# Patient Record
Sex: MALE | Race: WHITE | NOT HISPANIC OR LATINO | URBAN - METROPOLITAN AREA
[De-identification: names, ages, dates, MRNs, and addresses within clinical notes are randomized per-mention and may not be internally consistent; named-entity substitution may affect disease eponyms.]

---

## 2017-12-27 PROBLEM — Z12.11 SCREENING FOR COLON CANCER: Status: ACTIVE | Noted: 2017-12-27

## 2018-08-01 PROBLEM — I77.9 PAOD (PERIPHERAL ARTERIAL OCCLUSIVE DISEASE) (HCC): Status: ACTIVE | Noted: 2018-08-01

## 2018-08-01 PROBLEM — I77.9 PAOD (PERIPHERAL ARTERIAL OCCLUSIVE DISEASE): Status: ACTIVE | Noted: 2018-08-01

## 2019-05-08 ENCOUNTER — APPOINTMENT (RX ONLY)
Dept: URBAN - METROPOLITAN AREA CLINIC 151 | Facility: CLINIC | Age: 74
Setting detail: DERMATOLOGY
End: 2019-05-08

## 2019-05-08 DIAGNOSIS — L20.89 OTHER ATOPIC DERMATITIS: ICD-10-CM

## 2019-05-08 DIAGNOSIS — Z71.89 OTHER SPECIFIED COUNSELING: ICD-10-CM

## 2019-05-08 DIAGNOSIS — L82.1 OTHER SEBORRHEIC KERATOSIS: ICD-10-CM

## 2019-05-08 DIAGNOSIS — L57.0 ACTINIC KERATOSIS: ICD-10-CM

## 2019-05-08 DIAGNOSIS — L91.8 OTHER HYPERTROPHIC DISORDERS OF THE SKIN: ICD-10-CM

## 2019-05-08 DIAGNOSIS — Z85.828 PERSONAL HISTORY OF OTHER MALIGNANT NEOPLASM OF SKIN: ICD-10-CM

## 2019-05-08 DIAGNOSIS — L81.4 OTHER MELANIN HYPERPIGMENTATION: ICD-10-CM

## 2019-05-08 PROBLEM — L20.84 INTRINSIC (ALLERGIC) ECZEMA: Status: ACTIVE | Noted: 2019-05-08

## 2019-05-08 PROCEDURE — 99213 OFFICE O/P EST LOW 20 MIN: CPT | Mod: 25

## 2019-05-08 PROCEDURE — ? INVENTORY

## 2019-05-08 PROCEDURE — ? COUNSELING

## 2019-05-08 PROCEDURE — 17004 DESTROY PREMAL LESIONS 15/>: CPT

## 2019-05-08 PROCEDURE — ? LIQUID NITROGEN

## 2019-05-08 PROCEDURE — ? TREATMENT REGIMEN

## 2019-05-08 ASSESSMENT — LOCATION ZONE DERM
LOCATION ZONE: FACE
LOCATION ZONE: SCALP
LOCATION ZONE: LEG
LOCATION ZONE: EYELID
LOCATION ZONE: HAND
LOCATION ZONE: TRUNK
LOCATION ZONE: NECK
LOCATION ZONE: ARM

## 2019-05-08 ASSESSMENT — LOCATION DETAILED DESCRIPTION DERM
LOCATION DETAILED: POSTERIOR MID-PARIETAL SCALP
LOCATION DETAILED: LEFT PROXIMAL DORSAL FOREARM
LOCATION DETAILED: RIGHT INFERIOR LID MARGIN
LOCATION DETAILED: RIGHT SUPERIOR FOREHEAD
LOCATION DETAILED: LEFT DISTAL DORSAL FOREARM
LOCATION DETAILED: SUPERIOR MID FOREHEAD
LOCATION DETAILED: LEFT INFERIOR UPPER BACK
LOCATION DETAILED: LEFT CLAVICULAR NECK
LOCATION DETAILED: RIGHT FOREHEAD
LOCATION DETAILED: LEFT SUPERIOR FOREHEAD
LOCATION DETAILED: RIGHT RADIAL DORSAL HAND
LOCATION DETAILED: LEFT LATERAL ABDOMEN
LOCATION DETAILED: LEFT FOREHEAD
LOCATION DETAILED: LEFT LATERAL TEMPLE
LOCATION DETAILED: RIGHT LATERAL UPPER BACK
LOCATION DETAILED: LEFT POSTERIOR SHOULDER
LOCATION DETAILED: RIGHT INFERIOR LATERAL MIDBACK
LOCATION DETAILED: RIGHT ANTERIOR DISTAL THIGH
LOCATION DETAILED: LEFT SUPERIOR PARIETAL SCALP
LOCATION DETAILED: RIGHT POSTERIOR SHOULDER
LOCATION DETAILED: RIGHT INFERIOR LATERAL FOREHEAD
LOCATION DETAILED: LEFT CENTRAL ZYGOMA
LOCATION DETAILED: RIGHT SUPERIOR PARIETAL SCALP
LOCATION DETAILED: RIGHT DISTAL DORSAL FOREARM
LOCATION DETAILED: LEFT RADIAL DORSAL HAND
LOCATION DETAILED: LEFT PROXIMAL CALF

## 2019-05-08 ASSESSMENT — LOCATION SIMPLE DESCRIPTION DERM
LOCATION SIMPLE: LEFT ANTERIOR NECK
LOCATION SIMPLE: RIGHT SHOULDER
LOCATION SIMPLE: RIGHT FOREHEAD
LOCATION SIMPLE: ABDOMEN
LOCATION SIMPLE: LEFT TEMPLE
LOCATION SIMPLE: LEFT SHOULDER
LOCATION SIMPLE: LEFT UPPER BACK
LOCATION SIMPLE: LEFT HAND
LOCATION SIMPLE: POSTERIOR SCALP
LOCATION SIMPLE: RIGHT THIGH
LOCATION SIMPLE: RIGHT FOREARM
LOCATION SIMPLE: SCALP
LOCATION SIMPLE: RIGHT HAND
LOCATION SIMPLE: LEFT FOREHEAD
LOCATION SIMPLE: RIGHT LOWER BACK
LOCATION SIMPLE: LEFT ZYGOMA
LOCATION SIMPLE: LEFT FOREARM
LOCATION SIMPLE: SUPERIOR FOREHEAD
LOCATION SIMPLE: RIGHT INFERIOR EYELID
LOCATION SIMPLE: RIGHT UPPER BACK
LOCATION SIMPLE: LEFT CALF

## 2019-11-13 ENCOUNTER — APPOINTMENT (RX ONLY)
Dept: URBAN - METROPOLITAN AREA CLINIC 151 | Facility: CLINIC | Age: 74
Setting detail: DERMATOLOGY
End: 2019-11-13

## 2019-11-13 DIAGNOSIS — D18.0 HEMANGIOMA: ICD-10-CM

## 2019-11-13 DIAGNOSIS — L82.1 OTHER SEBORRHEIC KERATOSIS: ICD-10-CM

## 2019-11-13 DIAGNOSIS — L57.0 ACTINIC KERATOSIS: ICD-10-CM

## 2019-11-13 DIAGNOSIS — L81.4 OTHER MELANIN HYPERPIGMENTATION: ICD-10-CM

## 2019-11-13 DIAGNOSIS — Z71.89 OTHER SPECIFIED COUNSELING: ICD-10-CM

## 2019-11-13 DIAGNOSIS — Z85.828 PERSONAL HISTORY OF OTHER MALIGNANT NEOPLASM OF SKIN: ICD-10-CM

## 2019-11-13 PROBLEM — D18.01 HEMANGIOMA OF SKIN AND SUBCUTANEOUS TISSUE: Status: ACTIVE | Noted: 2019-11-13

## 2019-11-13 PROBLEM — D48.5 NEOPLASM OF UNCERTAIN BEHAVIOR OF SKIN: Status: ACTIVE | Noted: 2019-11-13

## 2019-11-13 PROCEDURE — ? BIOPSY BY SHAVE METHOD

## 2019-11-13 PROCEDURE — 11102 TANGNTL BX SKIN SINGLE LES: CPT

## 2019-11-13 PROCEDURE — 99213 OFFICE O/P EST LOW 20 MIN: CPT | Mod: 25

## 2019-11-13 PROCEDURE — ? LIQUID NITROGEN

## 2019-11-13 PROCEDURE — ? COUNSELING

## 2019-11-13 PROCEDURE — 17000 DESTRUCT PREMALG LESION: CPT | Mod: 59

## 2019-11-13 PROCEDURE — ? FULL BODY SKIN EXAM

## 2019-11-13 PROCEDURE — 17003 DESTRUCT PREMALG LES 2-14: CPT

## 2019-11-13 ASSESSMENT — LOCATION SIMPLE DESCRIPTION DERM
LOCATION SIMPLE: RIGHT PRETIBIAL REGION
LOCATION SIMPLE: RIGHT HAND
LOCATION SIMPLE: CHEST
LOCATION SIMPLE: RIGHT UPPER BACK
LOCATION SIMPLE: LEFT UPPER BACK
LOCATION SIMPLE: LEFT FOREHEAD
LOCATION SIMPLE: RIGHT SCALP
LOCATION SIMPLE: RIGHT FOREARM
LOCATION SIMPLE: LEFT FOREARM
LOCATION SIMPLE: LEFT CHEEK
LOCATION SIMPLE: SUPERIOR FOREHEAD
LOCATION SIMPLE: RIGHT CHEEK
LOCATION SIMPLE: ABDOMEN
LOCATION SIMPLE: LEFT ZYGOMA

## 2019-11-13 ASSESSMENT — LOCATION DETAILED DESCRIPTION DERM
LOCATION DETAILED: SUPERIOR MID FOREHEAD
LOCATION DETAILED: RIGHT DISTAL RADIAL DORSAL FOREARM
LOCATION DETAILED: LEFT CENTRAL ZYGOMA
LOCATION DETAILED: LEFT DISTAL DORSAL FOREARM
LOCATION DETAILED: RIGHT PROXIMAL PRETIBIAL REGION
LOCATION DETAILED: LEFT DISTAL RADIAL DORSAL FOREARM
LOCATION DETAILED: RIGHT INFERIOR CENTRAL MALAR CHEEK
LOCATION DETAILED: LEFT RIB CAGE
LOCATION DETAILED: RIGHT DISTAL DORSAL FOREARM
LOCATION DETAILED: LEFT SUPERIOR MEDIAL FOREHEAD
LOCATION DETAILED: LEFT MID-UPPER BACK
LOCATION DETAILED: LEFT SUPERIOR UPPER BACK
LOCATION DETAILED: LEFT LATERAL FOREHEAD
LOCATION DETAILED: RIGHT LATERAL ABDOMEN
LOCATION DETAILED: RIGHT SUPERIOR UPPER BACK
LOCATION DETAILED: RIGHT MEDIAL SUPERIOR CHEST
LOCATION DETAILED: LEFT PROXIMAL DORSAL FOREARM
LOCATION DETAILED: RIGHT CENTRAL FRONTAL SCALP
LOCATION DETAILED: LEFT INFERIOR LATERAL MALAR CHEEK
LOCATION DETAILED: RIGHT RADIAL DORSAL HAND
LOCATION DETAILED: RIGHT INFERIOR LATERAL UPPER BACK
LOCATION DETAILED: LEFT LATERAL ZYGOMA
LOCATION DETAILED: LEFT SUPERIOR FOREHEAD

## 2019-11-13 ASSESSMENT — LOCATION ZONE DERM
LOCATION ZONE: ARM
LOCATION ZONE: FACE
LOCATION ZONE: LEG
LOCATION ZONE: TRUNK
LOCATION ZONE: SCALP
LOCATION ZONE: HAND

## 2020-02-17 ENCOUNTER — APPOINTMENT (RX ONLY)
Dept: URBAN - METROPOLITAN AREA CLINIC 151 | Facility: CLINIC | Age: 75
Setting detail: DERMATOLOGY
End: 2020-02-17

## 2020-02-17 DIAGNOSIS — L20.89 OTHER ATOPIC DERMATITIS: ICD-10-CM

## 2020-02-17 DIAGNOSIS — L81.4 OTHER MELANIN HYPERPIGMENTATION: ICD-10-CM

## 2020-02-17 DIAGNOSIS — L82.0 INFLAMED SEBORRHEIC KERATOSIS: ICD-10-CM

## 2020-02-17 DIAGNOSIS — Z71.89 OTHER SPECIFIED COUNSELING: ICD-10-CM

## 2020-02-17 DIAGNOSIS — L57.0 ACTINIC KERATOSIS: ICD-10-CM

## 2020-02-17 PROBLEM — L20.84 INTRINSIC (ALLERGIC) ECZEMA: Status: ACTIVE | Noted: 2020-02-17

## 2020-02-17 PROCEDURE — ? FULL BODY SKIN EXAM - DECLINED

## 2020-02-17 PROCEDURE — ? COUNSELING

## 2020-02-17 PROCEDURE — 17110 DESTRUCTION B9 LES UP TO 14: CPT

## 2020-02-17 PROCEDURE — 99213 OFFICE O/P EST LOW 20 MIN: CPT | Mod: 25

## 2020-02-17 PROCEDURE — 17000 DESTRUCT PREMALG LESION: CPT | Mod: 59

## 2020-02-17 PROCEDURE — ? LIQUID NITROGEN

## 2020-02-17 ASSESSMENT — LOCATION DETAILED DESCRIPTION DERM
LOCATION DETAILED: LEFT PROXIMAL DORSAL FOREARM
LOCATION DETAILED: LEFT SUPERIOR MEDIAL FOREHEAD
LOCATION DETAILED: LEFT LATERAL MALAR CHEEK
LOCATION DETAILED: RIGHT MEDIAL TEMPLE
LOCATION DETAILED: RIGHT INFERIOR LATERAL MALAR CHEEK
LOCATION DETAILED: RIGHT DISTAL DORSAL FOREARM
LOCATION DETAILED: RIGHT CENTRAL TEMPLE
LOCATION DETAILED: RIGHT MEDIAL FOREHEAD

## 2020-02-17 ASSESSMENT — LOCATION SIMPLE DESCRIPTION DERM
LOCATION SIMPLE: RIGHT CHEEK
LOCATION SIMPLE: LEFT CHEEK
LOCATION SIMPLE: RIGHT TEMPLE
LOCATION SIMPLE: RIGHT FOREARM
LOCATION SIMPLE: LEFT FOREARM
LOCATION SIMPLE: RIGHT FOREHEAD
LOCATION SIMPLE: LEFT FOREHEAD

## 2020-02-17 ASSESSMENT — LOCATION ZONE DERM
LOCATION ZONE: ARM
LOCATION ZONE: FACE

## 2020-05-13 ENCOUNTER — APPOINTMENT (RX ONLY)
Dept: URBAN - METROPOLITAN AREA CLINIC 151 | Facility: CLINIC | Age: 75
Setting detail: DERMATOLOGY
End: 2020-05-13

## 2020-05-13 DIAGNOSIS — L57.0 ACTINIC KERATOSIS: ICD-10-CM

## 2020-05-13 DIAGNOSIS — Z85.828 PERSONAL HISTORY OF OTHER MALIGNANT NEOPLASM OF SKIN: ICD-10-CM

## 2020-05-13 DIAGNOSIS — Z71.89 OTHER SPECIFIED COUNSELING: ICD-10-CM

## 2020-05-13 DIAGNOSIS — L81.4 OTHER MELANIN HYPERPIGMENTATION: ICD-10-CM

## 2020-05-13 DIAGNOSIS — L82.1 OTHER SEBORRHEIC KERATOSIS: ICD-10-CM

## 2020-05-13 PROBLEM — D48.5 NEOPLASM OF UNCERTAIN BEHAVIOR OF SKIN: Status: ACTIVE | Noted: 2020-05-13

## 2020-05-13 PROCEDURE — ? LIQUID NITROGEN

## 2020-05-13 PROCEDURE — 17000 DESTRUCT PREMALG LESION: CPT | Mod: 59

## 2020-05-13 PROCEDURE — 17003 DESTRUCT PREMALG LES 2-14: CPT

## 2020-05-13 PROCEDURE — ? FULL BODY SKIN EXAM

## 2020-05-13 PROCEDURE — ? COUNSELING

## 2020-05-13 PROCEDURE — ? ADDITIONAL NOTES

## 2020-05-13 PROCEDURE — 11102 TANGNTL BX SKIN SINGLE LES: CPT

## 2020-05-13 PROCEDURE — 99213 OFFICE O/P EST LOW 20 MIN: CPT | Mod: 25

## 2020-05-13 PROCEDURE — ? BIOPSY BY SHAVE METHOD

## 2020-05-13 ASSESSMENT — LOCATION SIMPLE DESCRIPTION DERM
LOCATION SIMPLE: RIGHT CHEEK
LOCATION SIMPLE: LEFT UPPER ARM
LOCATION SIMPLE: RIGHT SHOULDER
LOCATION SIMPLE: RIGHT UPPER BACK
LOCATION SIMPLE: LEFT SCALP
LOCATION SIMPLE: RIGHT UPPER ARM
LOCATION SIMPLE: LEFT CHEEK
LOCATION SIMPLE: LEFT FOREARM
LOCATION SIMPLE: LEFT ZYGOMA
LOCATION SIMPLE: SCALP
LOCATION SIMPLE: POSTERIOR NECK
LOCATION SIMPLE: LEFT SHOULDER
LOCATION SIMPLE: LEFT FOREHEAD

## 2020-05-13 ASSESSMENT — LOCATION DETAILED DESCRIPTION DERM
LOCATION DETAILED: LEFT PROXIMAL POSTERIOR UPPER ARM
LOCATION DETAILED: LEFT POSTERIOR SHOULDER
LOCATION DETAILED: LEFT SUPERIOR FOREHEAD
LOCATION DETAILED: RIGHT ANTERIOR PROXIMAL UPPER ARM
LOCATION DETAILED: RIGHT INFERIOR MEDIAL MALAR CHEEK
LOCATION DETAILED: LEFT LATERAL TRAPEZIAL NECK
LOCATION DETAILED: RIGHT INFERIOR LATERAL UPPER BACK
LOCATION DETAILED: RIGHT POSTERIOR SHOULDER
LOCATION DETAILED: LEFT PROXIMAL DORSAL FOREARM
LOCATION DETAILED: LEFT ANTERIOR PROXIMAL UPPER ARM
LOCATION DETAILED: LEFT SUPERIOR LATERAL MALAR CHEEK
LOCATION DETAILED: LEFT SUPERIOR MEDIAL FOREHEAD
LOCATION DETAILED: LEFT LATERAL ZYGOMA
LOCATION DETAILED: RIGHT SUPERIOR PARIETAL SCALP
LOCATION DETAILED: LEFT CENTRAL FRONTAL SCALP
LOCATION DETAILED: LEFT CENTRAL ZYGOMA
LOCATION DETAILED: LEFT DISTAL DORSAL FOREARM

## 2020-05-13 ASSESSMENT — LOCATION ZONE DERM
LOCATION ZONE: FACE
LOCATION ZONE: ARM
LOCATION ZONE: NECK
LOCATION ZONE: SCALP
LOCATION ZONE: TRUNK

## 2020-05-29 ENCOUNTER — APPOINTMENT (RX ONLY)
Dept: URBAN - METROPOLITAN AREA CLINIC 151 | Facility: CLINIC | Age: 75
Setting detail: DERMATOLOGY
End: 2020-05-29

## 2020-05-29 PROBLEM — C44.519 BASAL CELL CARCINOMA OF SKIN OF OTHER PART OF TRUNK: Status: ACTIVE | Noted: 2020-05-29

## 2020-05-29 PROCEDURE — ? COUNSELING

## 2020-05-29 PROCEDURE — ? CURETTAGE AND DESTRUCTION

## 2020-05-29 PROCEDURE — 17261 DSTRJ MAL LES T/A/L .6-1.0CM: CPT

## 2020-05-29 NOTE — PROCEDURE: CURETTAGE AND DESTRUCTION
Detail Level: Detailed
Biopsy Photograph Reviewed: Yes
Size Of Lesion In Cm: 0.7
Size Of Lesion After Curettage: 1
Add Intralesional Injection: No
Anesthesia Type: 1% lidocaine with epinephrine
Cautery Type: electrodesiccation
Number Of Curettages: 3
What Was Performed First?: Curettage
Additional Information: (Optional): The wound was cleaned, and a pressure dressing was applied.  The patient received detailed post-op instructions.
Consent was obtained from the patient. The risks, benefits and alternatives to therapy were discussed in detail. Specifically, the risks of infection, scarring, bleeding, prolonged wound healing, nerve injury, incomplete removal, allergy to anesthesia and recurrence were addressed. Alternatives to ED&C, such as: surgical removal and XRT were also discussed.  Prior to the procedure, the treatment site was clearly identified and confirmed by the patient. All components of Universal Protocol/PAUSE Rule completed.
Post-Care Instructions: I reviewed with the patient in detail post-care instructions. Patient is to keep the area dry for 48 hours, and not to engage in any swimming until the area is healed. Should the patient develop any fevers, chills, bleeding, severe pain patient will contact the office immediately.
Bill As A Line Item Or As Units: Line Item

## 2020-08-27 ENCOUNTER — APPOINTMENT (RX ONLY)
Dept: URBAN - METROPOLITAN AREA CLINIC 151 | Facility: CLINIC | Age: 75
Setting detail: DERMATOLOGY
End: 2020-08-27

## 2020-08-27 DIAGNOSIS — L20.89 OTHER ATOPIC DERMATITIS: ICD-10-CM

## 2020-08-27 DIAGNOSIS — L82.1 OTHER SEBORRHEIC KERATOSIS: ICD-10-CM

## 2020-08-27 DIAGNOSIS — Z71.89 OTHER SPECIFIED COUNSELING: ICD-10-CM

## 2020-08-27 DIAGNOSIS — L81.4 OTHER MELANIN HYPERPIGMENTATION: ICD-10-CM

## 2020-08-27 DIAGNOSIS — L91.8 OTHER HYPERTROPHIC DISORDERS OF THE SKIN: ICD-10-CM

## 2020-08-27 DIAGNOSIS — L57.0 ACTINIC KERATOSIS: ICD-10-CM

## 2020-08-27 DIAGNOSIS — Z86.007 PERSONAL HISTORY OF IN-SITU NEOPLASM OF SKIN: ICD-10-CM

## 2020-08-27 PROBLEM — Z85.828 PERSONAL HISTORY OF OTHER MALIGNANT NEOPLASM OF SKIN: Status: ACTIVE | Noted: 2020-08-27

## 2020-08-27 PROBLEM — L20.84 INTRINSIC (ALLERGIC) ECZEMA: Status: ACTIVE | Noted: 2020-08-27

## 2020-08-27 PROCEDURE — ? FULL BODY SKIN EXAM

## 2020-08-27 PROCEDURE — ? COUNSELING

## 2020-08-27 PROCEDURE — ? LIQUID NITROGEN

## 2020-08-27 PROCEDURE — ? TREATMENT REGIMEN

## 2020-08-27 PROCEDURE — ? ADDITIONAL NOTES

## 2020-08-27 PROCEDURE — 99213 OFFICE O/P EST LOW 20 MIN: CPT | Mod: 25

## 2020-08-27 PROCEDURE — 17004 DESTROY PREMAL LESIONS 15/>: CPT

## 2020-08-27 ASSESSMENT — LOCATION SIMPLE DESCRIPTION DERM
LOCATION SIMPLE: RIGHT INFERIOR EYELID
LOCATION SIMPLE: LEFT SHOULDER
LOCATION SIMPLE: RIGHT CHEEK
LOCATION SIMPLE: LEFT HAND
LOCATION SIMPLE: LEFT FOREARM
LOCATION SIMPLE: RIGHT FOREARM
LOCATION SIMPLE: RIGHT HAND
LOCATION SIMPLE: RIGHT FOREHEAD
LOCATION SIMPLE: LEFT UPPER ARM
LOCATION SIMPLE: RIGHT SHOULDER
LOCATION SIMPLE: LEFT FOREHEAD
LOCATION SIMPLE: LEFT LATERAL CANTHUS
LOCATION SIMPLE: POSTERIOR NECK
LOCATION SIMPLE: LEFT CHEEK
LOCATION SIMPLE: SCALP
LOCATION SIMPLE: RIGHT ZYGOMA
LOCATION SIMPLE: CHEST

## 2020-08-27 ASSESSMENT — LOCATION ZONE DERM
LOCATION ZONE: NECK
LOCATION ZONE: TRUNK
LOCATION ZONE: SCALP
LOCATION ZONE: EYELID
LOCATION ZONE: HAND
LOCATION ZONE: FACE
LOCATION ZONE: ARM

## 2020-08-27 ASSESSMENT — LOCATION DETAILED DESCRIPTION DERM
LOCATION DETAILED: LEFT ULNAR DORSAL HAND
LOCATION DETAILED: RIGHT PROXIMAL DORSAL FOREARM
LOCATION DETAILED: LEFT PROXIMAL RADIAL DORSAL FOREARM
LOCATION DETAILED: LEFT SUPERIOR PARIETAL SCALP
LOCATION DETAILED: LEFT SUPERIOR FOREHEAD
LOCATION DETAILED: RIGHT CENTRAL MALAR CHEEK
LOCATION DETAILED: LEFT ANTERIOR SHOULDER
LOCATION DETAILED: RIGHT LATERAL SUPERIOR CHEST
LOCATION DETAILED: LEFT DISTAL DORSAL FOREARM
LOCATION DETAILED: RIGHT MEDIAL ZYGOMA
LOCATION DETAILED: RIGHT INFERIOR LATERAL FOREHEAD
LOCATION DETAILED: RIGHT POSTERIOR SHOULDER
LOCATION DETAILED: LEFT PROXIMAL POSTERIOR UPPER ARM
LOCATION DETAILED: RIGHT RADIAL DORSAL HAND
LOCATION DETAILED: RIGHT LATERAL INFERIOR EYELID
LOCATION DETAILED: LEFT INFERIOR FOREHEAD
LOCATION DETAILED: RIGHT ANTERIOR SHOULDER
LOCATION DETAILED: RIGHT SUPERIOR LATERAL FOREHEAD
LOCATION DETAILED: LEFT LATERAL CANTHUS
LOCATION DETAILED: RIGHT SUPERIOR FOREHEAD
LOCATION DETAILED: MID POSTERIOR NECK
LOCATION DETAILED: LEFT CENTRAL MALAR CHEEK

## 2020-10-20 ENCOUNTER — APPOINTMENT (RX ONLY)
Dept: URBAN - METROPOLITAN AREA CLINIC 151 | Facility: CLINIC | Age: 75
Setting detail: DERMATOLOGY
End: 2020-10-20

## 2020-10-20 DIAGNOSIS — L57.0 ACTINIC KERATOSIS: ICD-10-CM

## 2020-10-20 DIAGNOSIS — Z85.828 PERSONAL HISTORY OF OTHER MALIGNANT NEOPLASM OF SKIN: ICD-10-CM

## 2020-10-20 PROCEDURE — ? ADDITIONAL NOTES

## 2020-10-20 PROCEDURE — ? COUNSELING

## 2020-10-20 PROCEDURE — ? OTHER

## 2020-10-20 PROCEDURE — 99213 OFFICE O/P EST LOW 20 MIN: CPT

## 2020-10-20 ASSESSMENT — LOCATION DETAILED DESCRIPTION DERM
LOCATION DETAILED: LEFT SUPERIOR FOREHEAD
LOCATION DETAILED: RIGHT INFERIOR FOREHEAD
LOCATION DETAILED: RIGHT LATERAL SUPERIOR CHEST
LOCATION DETAILED: RIGHT SUPERIOR FOREHEAD

## 2020-10-20 ASSESSMENT — LOCATION SIMPLE DESCRIPTION DERM
LOCATION SIMPLE: CHEST
LOCATION SIMPLE: LEFT FOREHEAD
LOCATION SIMPLE: RIGHT FOREHEAD

## 2020-10-20 ASSESSMENT — LOCATION ZONE DERM
LOCATION ZONE: TRUNK
LOCATION ZONE: FACE

## 2020-10-20 NOTE — HPI: NON-MELANOMA SKIN CANCER F/U (HISTORY OF NMSC)
How Many Skin Cancers Have You Had?: one
What Is The Reason For Today's Visit?: History of Non-Melanoma Skin Cancer
When Was Your Last Cancer Diagnosed?: 5/29/2020

## 2020-10-20 NOTE — PROCEDURE: OTHER
Note Text (......Xxx Chief Complaint.): This diagnosis correlates with the
Detail Level: Zone
Other (Free Text): Advise patient to apply a thin layer of the flouroracil topical cream twice daily daytime and at bedtime for two weeks then stop apply on the pre cancer spots on the forehead areas. Will follow up in 6 weeks as needed for re assessment.

## 2020-12-07 ENCOUNTER — APPOINTMENT (RX ONLY)
Dept: URBAN - METROPOLITAN AREA CLINIC 151 | Facility: CLINIC | Age: 75
Setting detail: DERMATOLOGY
End: 2020-12-07

## 2020-12-07 DIAGNOSIS — L82.1 OTHER SEBORRHEIC KERATOSIS: ICD-10-CM

## 2020-12-07 DIAGNOSIS — L57.0 ACTINIC KERATOSIS: ICD-10-CM

## 2020-12-07 DIAGNOSIS — Z85.828 PERSONAL HISTORY OF OTHER MALIGNANT NEOPLASM OF SKIN: ICD-10-CM

## 2020-12-07 PROCEDURE — ? COUNSELING

## 2020-12-07 PROCEDURE — 17000 DESTRUCT PREMALG LESION: CPT

## 2020-12-07 PROCEDURE — ? LIQUID NITROGEN

## 2020-12-07 PROCEDURE — ? OTHER

## 2020-12-07 PROCEDURE — ? ADDITIONAL NOTES

## 2020-12-07 PROCEDURE — 99213 OFFICE O/P EST LOW 20 MIN: CPT | Mod: 25

## 2020-12-07 ASSESSMENT — LOCATION DETAILED DESCRIPTION DERM
LOCATION DETAILED: RIGHT CENTRAL TEMPLE
LOCATION DETAILED: RIGHT LATERAL SUPERIOR CHEST
LOCATION DETAILED: RIGHT MEDIAL FRONTAL SCALP
LOCATION DETAILED: RIGHT DISTAL CALF
LOCATION DETAILED: LEFT VENTRAL DISTAL FOREARM

## 2020-12-07 ASSESSMENT — LOCATION SIMPLE DESCRIPTION DERM
LOCATION SIMPLE: RIGHT CALF
LOCATION SIMPLE: CHEST
LOCATION SIMPLE: RIGHT TEMPLE
LOCATION SIMPLE: RIGHT SCALP
LOCATION SIMPLE: LEFT FOREARM

## 2020-12-07 ASSESSMENT — LOCATION ZONE DERM
LOCATION ZONE: FACE
LOCATION ZONE: SCALP
LOCATION ZONE: ARM
LOCATION ZONE: TRUNK
LOCATION ZONE: LEG

## 2020-12-07 NOTE — PROCEDURE: OTHER
Detail Level: Zone
Other (Free Text): Patient has some residual actinic keratosis, he will use fluorouracil for an additional 2 weeks.
Note Text (......Xxx Chief Complaint.): This diagnosis correlates with the
Other (Free Text): Patient will try fluorouracil on his calf

## 2021-03-01 ENCOUNTER — APPOINTMENT (RX ONLY)
Dept: URBAN - METROPOLITAN AREA CLINIC 151 | Facility: CLINIC | Age: 76
Setting detail: DERMATOLOGY
End: 2021-03-01

## 2021-03-01 DIAGNOSIS — Z85.828 PERSONAL HISTORY OF OTHER MALIGNANT NEOPLASM OF SKIN: ICD-10-CM

## 2021-03-01 DIAGNOSIS — Z71.89 OTHER SPECIFIED COUNSELING: ICD-10-CM

## 2021-03-01 DIAGNOSIS — L57.0 ACTINIC KERATOSIS: ICD-10-CM

## 2021-03-01 DIAGNOSIS — L20.89 OTHER ATOPIC DERMATITIS: ICD-10-CM

## 2021-03-01 PROBLEM — D48.5 NEOPLASM OF UNCERTAIN BEHAVIOR OF SKIN: Status: ACTIVE | Noted: 2021-03-01

## 2021-03-01 PROBLEM — L20.84 INTRINSIC (ALLERGIC) ECZEMA: Status: ACTIVE | Noted: 2021-03-01

## 2021-03-01 PROCEDURE — 99214 OFFICE O/P EST MOD 30 MIN: CPT | Mod: 25

## 2021-03-01 PROCEDURE — ? PRESCRIPTION

## 2021-03-01 PROCEDURE — ? ADDITIONAL NOTES

## 2021-03-01 PROCEDURE — ? LIQUID NITROGEN

## 2021-03-01 PROCEDURE — ? FULL BODY SKIN EXAM

## 2021-03-01 PROCEDURE — 11102 TANGNTL BX SKIN SINGLE LES: CPT

## 2021-03-01 PROCEDURE — 17000 DESTRUCT PREMALG LESION: CPT | Mod: 59

## 2021-03-01 PROCEDURE — ? COUNSELING

## 2021-03-01 PROCEDURE — 17003 DESTRUCT PREMALG LES 2-14: CPT

## 2021-03-01 PROCEDURE — ? BIOPSY BY SHAVE METHOD

## 2021-03-01 RX ORDER — TRIAMCINOLONE ACETONIDE 1 MG/G
CREAM TOPICAL
Qty: 1 | Refills: 3 | Status: CANCELLED

## 2021-03-01 ASSESSMENT — LOCATION ZONE DERM
LOCATION ZONE: ARM
LOCATION ZONE: FACE
LOCATION ZONE: NOSE
LOCATION ZONE: SCALP
LOCATION ZONE: TRUNK

## 2021-03-01 ASSESSMENT — LOCATION SIMPLE DESCRIPTION DERM
LOCATION SIMPLE: RIGHT UPPER ARM
LOCATION SIMPLE: RIGHT UPPER BACK
LOCATION SIMPLE: UPPER BACK
LOCATION SIMPLE: SCALP
LOCATION SIMPLE: RIGHT TEMPLE
LOCATION SIMPLE: RIGHT FOREHEAD
LOCATION SIMPLE: NOSE

## 2021-03-01 ASSESSMENT — LOCATION DETAILED DESCRIPTION DERM
LOCATION DETAILED: RIGHT INFERIOR MEDIAL UPPER BACK
LOCATION DETAILED: INFERIOR THORACIC SPINE
LOCATION DETAILED: LEFT SUPERIOR PARIETAL SCALP
LOCATION DETAILED: RIGHT ANTERIOR PROXIMAL UPPER ARM
LOCATION DETAILED: RIGHT INFERIOR FOREHEAD
LOCATION DETAILED: RIGHT NASAL DORSUM
LOCATION DETAILED: RIGHT CENTRAL TEMPLE
LOCATION DETAILED: RIGHT SUPERIOR FOREHEAD

## 2021-03-23 ENCOUNTER — APPOINTMENT (RX ONLY)
Dept: URBAN - METROPOLITAN AREA CLINIC 151 | Facility: CLINIC | Age: 76
Setting detail: DERMATOLOGY
End: 2021-03-23

## 2021-03-23 PROBLEM — C44.612 BASAL CELL CARCINOMA OF SKIN OF RIGHT UPPER LIMB, INCLUDING SHOULDER: Status: ACTIVE | Noted: 2021-03-23

## 2021-03-23 PROCEDURE — ? CURETTAGE AND DESTRUCTION

## 2021-03-23 PROCEDURE — 17261 DSTRJ MAL LES T/A/L .6-1.0CM: CPT

## 2021-03-23 PROCEDURE — ? COUNSELING

## 2021-03-23 NOTE — PROCEDURE: CURETTAGE AND DESTRUCTION
Detail Level: Detailed
Biopsy Photograph Reviewed: Yes
Number Of Curettages: 3
Size Of Lesion In Cm: 0.6
Size Of Lesion After Curettage: 1
Add Intralesional Injection: No
Anesthesia Type: 1% lidocaine with epinephrine
Cautery Type: electrodesiccation
What Was Performed First?: Curettage
Final Size Statement: The size of the lesion after curettage was
Additional Information: (Optional): The wound was cleaned, and a pressure dressing was applied.  The patient received detailed post-op instructions.
Consent was obtained from the patient. The risks, benefits and alternatives to therapy were discussed in detail. Specifically, the risks of infection, scarring, bleeding, prolonged wound healing, nerve injury, incomplete removal, allergy to anesthesia and recurrence were addressed. Alternatives to ED&C, such as: surgical removal and XRT were also discussed.  Prior to the procedure, the treatment site was clearly identified and confirmed by the patient. All components of Universal Protocol/PAUSE Rule completed.
Post-Care Instructions: I reviewed with the patient in detail post-care instructions. Patient is to keep the area dry for 48 hours, and not to engage in any swimming until the area is healed. Should the patient develop any fevers, chills, bleeding, severe pain patient will contact the office immediately.
Bill As A Line Item Or As Units: Line Item

## 2021-03-25 ENCOUNTER — RX ONLY (OUTPATIENT)
Age: 76
Setting detail: RX ONLY
End: 2021-03-25

## 2021-03-25 RX ORDER — TRIAMCINOLONE ACETONIDE 1 MG/G
CREAM TOPICAL
Qty: 1 | Refills: 2 | Status: ERX | COMMUNITY
Start: 2021-03-25

## 2021-06-21 ENCOUNTER — APPOINTMENT (RX ONLY)
Dept: URBAN - METROPOLITAN AREA CLINIC 151 | Facility: CLINIC | Age: 76
Setting detail: DERMATOLOGY
End: 2021-06-21

## 2021-06-21 DIAGNOSIS — Z86.007 PERSONAL HISTORY OF IN-SITU NEOPLASM OF SKIN: ICD-10-CM

## 2021-06-21 DIAGNOSIS — L57.0 ACTINIC KERATOSIS: ICD-10-CM

## 2021-06-21 DIAGNOSIS — L20.89 OTHER ATOPIC DERMATITIS: ICD-10-CM | Status: WORSENING

## 2021-06-21 DIAGNOSIS — L85.3 XEROSIS CUTIS: ICD-10-CM

## 2021-06-21 DIAGNOSIS — L81.4 OTHER MELANIN HYPERPIGMENTATION: ICD-10-CM

## 2021-06-21 DIAGNOSIS — Z71.89 OTHER SPECIFIED COUNSELING: ICD-10-CM

## 2021-06-21 DIAGNOSIS — D18.0 HEMANGIOMA: ICD-10-CM

## 2021-06-21 DIAGNOSIS — Z85.828 PERSONAL HISTORY OF OTHER MALIGNANT NEOPLASM OF SKIN: ICD-10-CM

## 2021-06-21 PROBLEM — D18.01 HEMANGIOMA OF SKIN AND SUBCUTANEOUS TISSUE: Status: ACTIVE | Noted: 2021-06-21

## 2021-06-21 PROBLEM — L20.84 INTRINSIC (ALLERGIC) ECZEMA: Status: ACTIVE | Noted: 2021-06-21

## 2021-06-21 PROCEDURE — ? FULL BODY SKIN EXAM

## 2021-06-21 PROCEDURE — ? COUNSELING

## 2021-06-21 PROCEDURE — ? LIQUID NITROGEN

## 2021-06-21 PROCEDURE — 99213 OFFICE O/P EST LOW 20 MIN: CPT | Mod: 25

## 2021-06-21 PROCEDURE — 17000 DESTRUCT PREMALG LESION: CPT

## 2021-06-21 PROCEDURE — ? PRESCRIPTION MEDICATION MANAGEMENT

## 2021-06-21 PROCEDURE — 17003 DESTRUCT PREMALG LES 2-14: CPT

## 2021-06-21 ASSESSMENT — LOCATION DETAILED DESCRIPTION DERM
LOCATION DETAILED: RIGHT ANTERIOR SHOULDER
LOCATION DETAILED: LEFT ANTERIOR SHOULDER
LOCATION DETAILED: RIGHT SUPERIOR FOREHEAD
LOCATION DETAILED: RIGHT POSTERIOR SHOULDER
LOCATION DETAILED: RIGHT INFERIOR HELIX
LOCATION DETAILED: LEFT SUPERIOR LATERAL UPPER BACK
LOCATION DETAILED: LEFT SUPERIOR UPPER BACK
LOCATION DETAILED: LEFT PROXIMAL POSTERIOR UPPER ARM
LOCATION DETAILED: RIGHT PROXIMAL PRETIBIAL REGION
LOCATION DETAILED: LEFT PROXIMAL PRETIBIAL REGION
LOCATION DETAILED: RIGHT SUPERIOR UPPER BACK
LOCATION DETAILED: RIGHT INFERIOR OCCIPITAL SCALP
LOCATION DETAILED: RIGHT ANTERIOR PROXIMAL UPPER ARM
LOCATION DETAILED: LEFT SUPERIOR CRUS OF ANTIHELIX
LOCATION DETAILED: LEFT LATERAL SUPERIOR CHEST
LOCATION DETAILED: RIGHT PROXIMAL POSTERIOR UPPER ARM
LOCATION DETAILED: RIGHT ANTERIOR MEDIAL PROXIMAL UPPER ARM
LOCATION DETAILED: LEFT FOREHEAD
LOCATION DETAILED: RIGHT PROXIMAL DORSAL FOREARM
LOCATION DETAILED: RIGHT LATERAL SUPERIOR CHEST
LOCATION DETAILED: RIGHT ANTERIOR DISTAL THIGH

## 2021-06-21 ASSESSMENT — LOCATION ZONE DERM
LOCATION ZONE: SCALP
LOCATION ZONE: FACE
LOCATION ZONE: TRUNK
LOCATION ZONE: ARM
LOCATION ZONE: EAR
LOCATION ZONE: LEG

## 2021-06-21 ASSESSMENT — LOCATION SIMPLE DESCRIPTION DERM
LOCATION SIMPLE: RIGHT PRETIBIAL REGION
LOCATION SIMPLE: RIGHT FOREHEAD
LOCATION SIMPLE: RIGHT SHOULDER
LOCATION SIMPLE: LEFT EAR
LOCATION SIMPLE: RIGHT FOREARM
LOCATION SIMPLE: RIGHT UPPER ARM
LOCATION SIMPLE: POSTERIOR SCALP
LOCATION SIMPLE: LEFT FOREHEAD
LOCATION SIMPLE: LEFT PRETIBIAL REGION
LOCATION SIMPLE: CHEST
LOCATION SIMPLE: LEFT UPPER ARM
LOCATION SIMPLE: LEFT UPPER BACK
LOCATION SIMPLE: RIGHT THIGH
LOCATION SIMPLE: LEFT SHOULDER
LOCATION SIMPLE: RIGHT EAR
LOCATION SIMPLE: RIGHT UPPER BACK

## 2021-10-21 ENCOUNTER — APPOINTMENT (RX ONLY)
Dept: URBAN - METROPOLITAN AREA CLINIC 151 | Facility: CLINIC | Age: 76
Setting detail: DERMATOLOGY
End: 2021-10-21

## 2021-10-21 DIAGNOSIS — Z71.89 OTHER SPECIFIED COUNSELING: ICD-10-CM

## 2021-10-21 DIAGNOSIS — L82.1 OTHER SEBORRHEIC KERATOSIS: ICD-10-CM

## 2021-10-21 DIAGNOSIS — L57.0 ACTINIC KERATOSIS: ICD-10-CM

## 2021-10-21 DIAGNOSIS — L21.8 OTHER SEBORRHEIC DERMATITIS: ICD-10-CM | Status: INADEQUATELY CONTROLLED

## 2021-10-21 PROBLEM — D48.5 NEOPLASM OF UNCERTAIN BEHAVIOR OF SKIN: Status: ACTIVE | Noted: 2021-10-21

## 2021-10-21 PROCEDURE — 99214 OFFICE O/P EST MOD 30 MIN: CPT | Mod: 25

## 2021-10-21 PROCEDURE — ? PRESCRIPTION

## 2021-10-21 PROCEDURE — 11102 TANGNTL BX SKIN SINGLE LES: CPT

## 2021-10-21 PROCEDURE — ? LIQUID NITROGEN

## 2021-10-21 PROCEDURE — 17003 DESTRUCT PREMALG LES 2-14: CPT

## 2021-10-21 PROCEDURE — ? FULL BODY SKIN EXAM - DECLINED

## 2021-10-21 PROCEDURE — ? COUNSELING

## 2021-10-21 PROCEDURE — ? ADDITIONAL NOTES

## 2021-10-21 PROCEDURE — 17000 DESTRUCT PREMALG LESION: CPT | Mod: 59

## 2021-10-21 PROCEDURE — ? BIOPSY BY SHAVE METHOD

## 2021-10-21 RX ORDER — BETAMETHASONE DIPROPIONATE 0.5 MG/ML
LOTION TOPICAL
Qty: 60 | Refills: 1 | Status: ERX | COMMUNITY
Start: 2021-10-21

## 2021-10-21 RX ADMIN — BETAMETHASONE DIPROPIONATE 1: 0.5 LOTION TOPICAL at 00:00

## 2021-10-21 ASSESSMENT — LOCATION SIMPLE DESCRIPTION DERM
LOCATION SIMPLE: RIGHT FOREHEAD
LOCATION SIMPLE: SCALP
LOCATION SIMPLE: CHEST
LOCATION SIMPLE: RIGHT SCALP
LOCATION SIMPLE: LEFT FOREHEAD
LOCATION SIMPLE: LEFT CHEEK
LOCATION SIMPLE: LEFT EYEBROW

## 2021-10-21 ASSESSMENT — LOCATION DETAILED DESCRIPTION DERM
LOCATION DETAILED: LEFT SUPERIOR LATERAL MALAR CHEEK
LOCATION DETAILED: RIGHT MEDIAL FOREHEAD
LOCATION DETAILED: LEFT LATERAL EYEBROW
LOCATION DETAILED: RIGHT SUPERIOR FOREHEAD
LOCATION DETAILED: LEFT SUPERIOR PARIETAL SCALP
LOCATION DETAILED: LEFT FOREHEAD
LOCATION DETAILED: RIGHT CENTRAL FRONTAL SCALP
LOCATION DETAILED: STERNAL NOTCH

## 2021-10-21 ASSESSMENT — LOCATION ZONE DERM
LOCATION ZONE: SCALP
LOCATION ZONE: TRUNK
LOCATION ZONE: FACE

## 2021-10-21 NOTE — PROCEDURE: LIQUID NITROGEN
Duration Of Freeze Thaw-Cycle (Seconds): 2
Post-Care Instructions: I reviewed with the patient in detail post-care instructions. Patient is to wear sunprotection, and avoid picking at any of the treated lesions. Pt may apply Vaseline to crusted or scabbing areas.
Render Note In Bullet Format When Appropriate: No
Show Applicator Variable?: Yes
Detail Level: Detailed
Number Of Freeze-Thaw Cycles: 3 freeze-thaw cycles
Consent: The patient's consent was obtained including but not limited to risks of crusting, scabbing, blistering, scarring, darker or lighter pigmentary change, recurrence, incomplete removal and infection.

## 2021-11-04 ENCOUNTER — APPOINTMENT (RX ONLY)
Dept: URBAN - METROPOLITAN AREA CLINIC 151 | Facility: CLINIC | Age: 76
Setting detail: DERMATOLOGY
End: 2021-11-04

## 2021-11-04 DIAGNOSIS — L57.0 ACTINIC KERATOSIS: ICD-10-CM

## 2021-11-04 PROBLEM — C44.42 SQUAMOUS CELL CARCINOMA OF SKIN OF SCALP AND NECK: Status: ACTIVE | Noted: 2021-11-04

## 2021-11-04 PROCEDURE — 17000 DESTRUCT PREMALG LESION: CPT

## 2021-11-04 PROCEDURE — ? COUNSELING

## 2021-11-04 PROCEDURE — 17311 MOHS 1 STAGE H/N/HF/G: CPT

## 2021-11-04 PROCEDURE — 17003 DESTRUCT PREMALG LES 2-14: CPT

## 2021-11-04 PROCEDURE — ? MOHS SURGERY

## 2021-11-04 PROCEDURE — ? LIQUID NITROGEN

## 2021-11-04 PROCEDURE — 12031 INTMD RPR S/A/T/EXT 2.5 CM/<: CPT | Mod: 59

## 2021-11-04 ASSESSMENT — LOCATION SIMPLE DESCRIPTION DERM
LOCATION SIMPLE: SUPERIOR FOREHEAD
LOCATION SIMPLE: LEFT SCALP
LOCATION SIMPLE: RIGHT FOREHEAD
LOCATION SIMPLE: SCALP
LOCATION SIMPLE: LEFT FOREHEAD

## 2021-11-04 ASSESSMENT — LOCATION ZONE DERM
LOCATION ZONE: FACE
LOCATION ZONE: SCALP

## 2021-11-04 ASSESSMENT — LOCATION DETAILED DESCRIPTION DERM
LOCATION DETAILED: RIGHT MEDIAL FOREHEAD
LOCATION DETAILED: LEFT CENTRAL FRONTAL SCALP
LOCATION DETAILED: LEFT CENTRAL PARIETAL SCALP
LOCATION DETAILED: RIGHT SUPERIOR FOREHEAD
LOCATION DETAILED: LEFT SUPERIOR FOREHEAD
LOCATION DETAILED: LEFT SUPERIOR MEDIAL FOREHEAD
LOCATION DETAILED: SUPERIOR MID FOREHEAD

## 2021-11-04 NOTE — PROCEDURE: MOHS SURGERY
Attempted to call report to [facility]. They are going into report and requested a call back after 0730. Will notify oncoming shift.       Carlos Alberto Frausto RN  03/19/21 0701       Carlos Alberto Frausto RN  03/19/21 2186 Bcc Infiltrative Histology Text: There were numerous aggregates of basaloid cells demonstrating an infiltrative pattern.

## 2021-11-17 ENCOUNTER — APPOINTMENT (RX ONLY)
Dept: URBAN - METROPOLITAN AREA CLINIC 151 | Facility: CLINIC | Age: 76
Setting detail: DERMATOLOGY
End: 2021-11-17

## 2021-11-17 DIAGNOSIS — Z48.02 ENCOUNTER FOR REMOVAL OF SUTURES: ICD-10-CM

## 2021-11-17 PROCEDURE — ? SUTURE REMOVAL (GLOBAL PERIOD)

## 2021-11-17 PROCEDURE — ? ADDITIONAL NOTES

## 2021-11-17 PROCEDURE — 99024 POSTOP FOLLOW-UP VISIT: CPT

## 2021-11-17 ASSESSMENT — LOCATION DETAILED DESCRIPTION DERM: LOCATION DETAILED: LEFT SUPERIOR POSTERIOR PARIETAL SCALP

## 2021-11-17 ASSESSMENT — LOCATION ZONE DERM: LOCATION ZONE: SCALP

## 2021-11-17 ASSESSMENT — LOCATION SIMPLE DESCRIPTION DERM: LOCATION SIMPLE: POSTERIOR SCALP

## 2021-11-17 NOTE — PROCEDURE: SUTURE REMOVAL (GLOBAL PERIOD)
Detail Level: Detailed
Add 37261 Cpt? (Important Note: In 2017 The Use Of 84771 Is Being Tracked By Cms To Determine Future Global Period Reimbursement For Global Periods): yes

## 2021-12-14 ENCOUNTER — APPOINTMENT (RX ONLY)
Dept: URBAN - METROPOLITAN AREA CLINIC 151 | Facility: CLINIC | Age: 76
Setting detail: DERMATOLOGY
End: 2021-12-14

## 2021-12-14 DIAGNOSIS — L82.1 OTHER SEBORRHEIC KERATOSIS: ICD-10-CM

## 2021-12-14 DIAGNOSIS — D18.0 HEMANGIOMA: ICD-10-CM

## 2021-12-14 DIAGNOSIS — L57.0 ACTINIC KERATOSIS: ICD-10-CM

## 2021-12-14 DIAGNOSIS — Z71.89 OTHER SPECIFIED COUNSELING: ICD-10-CM

## 2021-12-14 DIAGNOSIS — Z85.828 PERSONAL HISTORY OF OTHER MALIGNANT NEOPLASM OF SKIN: ICD-10-CM

## 2021-12-14 DIAGNOSIS — L81.4 OTHER MELANIN HYPERPIGMENTATION: ICD-10-CM

## 2021-12-14 DIAGNOSIS — Z86.007 PERSONAL HISTORY OF IN-SITU NEOPLASM OF SKIN: ICD-10-CM

## 2021-12-14 DIAGNOSIS — L21.8 OTHER SEBORRHEIC DERMATITIS: ICD-10-CM | Status: WORSENING

## 2021-12-14 PROBLEM — D18.01 HEMANGIOMA OF SKIN AND SUBCUTANEOUS TISSUE: Status: ACTIVE | Noted: 2021-12-14

## 2021-12-14 PROCEDURE — 17000 DESTRUCT PREMALG LESION: CPT

## 2021-12-14 PROCEDURE — ? FULL BODY SKIN EXAM

## 2021-12-14 PROCEDURE — ? PRESCRIPTION MEDICATION MANAGEMENT

## 2021-12-14 PROCEDURE — ? LIQUID NITROGEN

## 2021-12-14 PROCEDURE — ? ADDITIONAL NOTES

## 2021-12-14 PROCEDURE — ? COUNSELING

## 2021-12-14 PROCEDURE — 17003 DESTRUCT PREMALG LES 2-14: CPT

## 2021-12-14 PROCEDURE — 99213 OFFICE O/P EST LOW 20 MIN: CPT | Mod: 25

## 2021-12-14 ASSESSMENT — LOCATION DETAILED DESCRIPTION DERM
LOCATION DETAILED: RIGHT ANTERIOR PROXIMAL UPPER ARM
LOCATION DETAILED: LEFT LATERAL SUPERIOR CHEST
LOCATION DETAILED: LEFT PROXIMAL POSTERIOR UPPER ARM
LOCATION DETAILED: RIGHT LATERAL FRONTAL SCALP
LOCATION DETAILED: RIGHT LATERAL INFERIOR CHEST
LOCATION DETAILED: RIGHT INFERIOR MEDIAL UPPER BACK
LOCATION DETAILED: RIGHT LATERAL SUPERIOR CHEST
LOCATION DETAILED: LEFT SUPERIOR CRUS OF ANTIHELIX
LOCATION DETAILED: RIGHT ANTERIOR SHOULDER
LOCATION DETAILED: RIGHT PROXIMAL POSTERIOR UPPER ARM
LOCATION DETAILED: LEFT ANTERIOR SHOULDER
LOCATION DETAILED: RIGHT ANTERIOR DISTAL THIGH
LOCATION DETAILED: LEFT SUPERIOR FOREHEAD

## 2021-12-14 ASSESSMENT — LOCATION ZONE DERM
LOCATION ZONE: EAR
LOCATION ZONE: LEG
LOCATION ZONE: ARM
LOCATION ZONE: TRUNK
LOCATION ZONE: SCALP
LOCATION ZONE: FACE

## 2021-12-14 ASSESSMENT — LOCATION SIMPLE DESCRIPTION DERM
LOCATION SIMPLE: LEFT UPPER ARM
LOCATION SIMPLE: RIGHT UPPER BACK
LOCATION SIMPLE: CHEST
LOCATION SIMPLE: LEFT FOREHEAD
LOCATION SIMPLE: LEFT SHOULDER
LOCATION SIMPLE: RIGHT THIGH
LOCATION SIMPLE: RIGHT SCALP
LOCATION SIMPLE: RIGHT UPPER ARM
LOCATION SIMPLE: RIGHT SHOULDER
LOCATION SIMPLE: LEFT EAR

## 2022-05-21 ENCOUNTER — HOSPITAL ENCOUNTER (INPATIENT)
Facility: HOSPITAL | Age: 77
LOS: 3 days | Discharge: HOME | End: 2022-05-24
Attending: EMERGENCY MEDICINE | Admitting: HOSPITALIST
Payer: MEDICARE

## 2022-05-21 ENCOUNTER — APPOINTMENT (OUTPATIENT)
Dept: CT IMAGING | Age: 77
End: 2022-05-21
Attending: EMERGENCY MEDICINE
Payer: MEDICARE

## 2022-05-21 DIAGNOSIS — K85.90 ACUTE PANCREATITIS, UNSPECIFIED COMPLICATION STATUS, UNSPECIFIED PANCREATITIS TYPE: ICD-10-CM

## 2022-05-21 DIAGNOSIS — R10.84 ABDOMINAL PAIN, GENERALIZED: Primary | ICD-10-CM

## 2022-05-21 DIAGNOSIS — E86.0 DEHYDRATION: ICD-10-CM

## 2022-05-21 DIAGNOSIS — E87.1 HYPONATREMIA: ICD-10-CM

## 2022-05-21 DIAGNOSIS — K59.00 CONSTIPATION, UNSPECIFIED CONSTIPATION TYPE: ICD-10-CM

## 2022-05-21 LAB
ALBUMIN SERPL-MCNC: 4.1 G/DL (ref 3.4–5)
ALBUMIN/GLOB SERPL: 1 {RATIO} (ref 1–2)
ALP LIVER SERPL-CCNC: 58 U/L
ALT SERPL-CCNC: 20 U/L
ANION GAP SERPL CALC-SCNC: 10 MMOL/L (ref 0–18)
AST SERPL-CCNC: 16 U/L (ref 15–37)
BASOPHILS # BLD AUTO: 0.01 X10(3) UL (ref 0–0.2)
BASOPHILS NFR BLD AUTO: 0.1 %
BILIRUB SERPL-MCNC: 1.2 MG/DL (ref 0.1–2)
BILIRUB UR QL STRIP.AUTO: NEGATIVE
BUN BLD-MCNC: 15 MG/DL (ref 7–18)
CALCIUM BLD-MCNC: 10.1 MG/DL (ref 8.5–10.1)
CHLORIDE SERPL-SCNC: 90 MMOL/L (ref 98–112)
CLARITY UR REFRACT.AUTO: CLEAR
CO2 SERPL-SCNC: 28 MMOL/L (ref 21–32)
COLOR UR AUTO: YELLOW
CREAT BLD-MCNC: 0.99 MG/DL
EOSINOPHIL # BLD AUTO: 0 X10(3) UL (ref 0–0.7)
EOSINOPHIL NFR BLD AUTO: 0 %
ERYTHROCYTE [DISTWIDTH] IN BLOOD BY AUTOMATED COUNT: 13.7 %
GLOBULIN PLAS-MCNC: 4.1 G/DL (ref 2.8–4.4)
GLUCOSE BLD-MCNC: 129 MG/DL (ref 70–99)
GLUCOSE UR STRIP.AUTO-MCNC: NEGATIVE MG/DL
HCT VFR BLD AUTO: 44.4 %
HGB BLD-MCNC: 15.3 G/DL
IMM GRANULOCYTES # BLD AUTO: 0.03 X10(3) UL (ref 0–1)
IMM GRANULOCYTES NFR BLD: 0.3 %
KETONES UR STRIP.AUTO-MCNC: 20 MG/DL
LEUKOCYTE ESTERASE UR QL STRIP.AUTO: NEGATIVE
LIPASE SERPL-CCNC: 199 U/L (ref 73–393)
LYMPHOCYTES # BLD AUTO: 0.38 X10(3) UL (ref 1–4)
LYMPHOCYTES NFR BLD AUTO: 3.5 %
MCH RBC QN AUTO: 32.6 PG (ref 26–34)
MCHC RBC AUTO-ENTMCNC: 34.5 G/DL (ref 31–37)
MCV RBC AUTO: 94.7 FL
MONOCYTES # BLD AUTO: 1.59 X10(3) UL (ref 0.1–1)
MONOCYTES NFR BLD AUTO: 14.7 %
NEUTROPHILS # BLD AUTO: 8.77 X10 (3) UL (ref 1.5–7.7)
NEUTROPHILS # BLD AUTO: 8.77 X10(3) UL (ref 1.5–7.7)
NEUTROPHILS NFR BLD AUTO: 81.4 %
NITRITE UR QL STRIP.AUTO: NEGATIVE
OSMOLALITY SERPL CALC.SUM OF ELEC: 269 MOSM/KG (ref 275–295)
PH UR STRIP.AUTO: 5 [PH] (ref 5–8)
PLATELET # BLD AUTO: 187 10(3)UL (ref 150–450)
POTASSIUM SERPL-SCNC: 4.6 MMOL/L (ref 3.5–5.1)
PROT SERPL-MCNC: 8.2 G/DL (ref 6.4–8.2)
PROT UR STRIP.AUTO-MCNC: 100 MG/DL
RBC # BLD AUTO: 4.69 X10(6)UL
RBC UR QL AUTO: NEGATIVE
SARS-COV-2 RNA RESP QL NAA+PROBE: NOT DETECTED
SODIUM SERPL-SCNC: 128 MMOL/L (ref 136–145)
SP GR UR STRIP.AUTO: >1.03 (ref 1–1.03)
TRIGL SERPL-MCNC: 69 MG/DL (ref 30–149)
UROBILINOGEN UR STRIP.AUTO-MCNC: <2 MG/DL
WBC # BLD AUTO: 10.8 X10(3) UL (ref 4–11)

## 2022-05-21 PROCEDURE — 99223 1ST HOSP IP/OBS HIGH 75: CPT | Performed by: HOSPITALIST

## 2022-05-21 PROCEDURE — 74177 CT ABD & PELVIS W/CONTRAST: CPT | Performed by: EMERGENCY MEDICINE

## 2022-05-21 RX ORDER — SODIUM CHLORIDE 9 MG/ML
INJECTION, SOLUTION INTRAVENOUS CONTINUOUS
Status: CANCELLED | OUTPATIENT
Start: 2022-05-21 | End: 2022-05-21

## 2022-05-21 RX ORDER — ACETAMINOPHEN 10 MG/ML
1000 INJECTION, SOLUTION INTRAVENOUS EVERY 6 HOURS PRN
Status: DISCONTINUED | OUTPATIENT
Start: 2022-05-21 | End: 2022-05-24

## 2022-05-21 RX ORDER — SODIUM CHLORIDE 9 MG/ML
INJECTION, SOLUTION INTRAVENOUS CONTINUOUS
Status: DISCONTINUED | OUTPATIENT
Start: 2022-05-21 | End: 2022-05-21

## 2022-05-21 RX ORDER — ONDANSETRON 2 MG/ML
4 INJECTION INTRAMUSCULAR; INTRAVENOUS EVERY 4 HOURS PRN
Status: CANCELLED | OUTPATIENT
Start: 2022-05-21 | End: 2022-05-21

## 2022-05-21 RX ORDER — HYDROMORPHONE HYDROCHLORIDE 1 MG/ML
0.5 INJECTION, SOLUTION INTRAMUSCULAR; INTRAVENOUS; SUBCUTANEOUS EVERY 30 MIN PRN
Status: CANCELLED | OUTPATIENT
Start: 2022-05-21 | End: 2022-05-21

## 2022-05-21 RX ORDER — HYDROMORPHONE HYDROCHLORIDE 1 MG/ML
0.2 INJECTION, SOLUTION INTRAMUSCULAR; INTRAVENOUS; SUBCUTANEOUS EVERY 2 HOUR PRN
Status: DISCONTINUED | OUTPATIENT
Start: 2022-05-21 | End: 2022-05-24

## 2022-05-21 RX ORDER — HYDRALAZINE HYDROCHLORIDE 20 MG/ML
10 INJECTION INTRAMUSCULAR; INTRAVENOUS EVERY 6 HOURS PRN
Status: DISCONTINUED | OUTPATIENT
Start: 2022-05-21 | End: 2022-05-24

## 2022-05-21 RX ORDER — DOCUSATE SODIUM 100 MG/1
100 CAPSULE, LIQUID FILLED ORAL ONCE
COMMUNITY

## 2022-05-21 RX ORDER — MORPHINE SULFATE 4 MG/ML
4 INJECTION, SOLUTION INTRAMUSCULAR; INTRAVENOUS ONCE
Status: COMPLETED | OUTPATIENT
Start: 2022-05-21 | End: 2022-05-21

## 2022-05-21 RX ORDER — SODIUM CHLORIDE 9 MG/ML
INJECTION, SOLUTION INTRAVENOUS CONTINUOUS
Status: DISCONTINUED | OUTPATIENT
Start: 2022-05-21 | End: 2022-05-23

## 2022-05-21 RX ORDER — METOPROLOL TARTRATE 5 MG/5ML
2.5 INJECTION INTRAVENOUS EVERY 6 HOURS
Status: DISCONTINUED | OUTPATIENT
Start: 2022-05-21 | End: 2022-05-22

## 2022-05-21 RX ORDER — ONDANSETRON 2 MG/ML
4 INJECTION INTRAMUSCULAR; INTRAVENOUS EVERY 6 HOURS PRN
Status: DISCONTINUED | OUTPATIENT
Start: 2022-05-21 | End: 2022-05-24

## 2022-05-21 RX ORDER — ENOXAPARIN SODIUM 100 MG/ML
40 INJECTION SUBCUTANEOUS DAILY
Status: DISCONTINUED | OUTPATIENT
Start: 2022-05-21 | End: 2022-05-24

## 2022-05-21 RX ORDER — ONDANSETRON 2 MG/ML
4 INJECTION INTRAMUSCULAR; INTRAVENOUS ONCE
Status: COMPLETED | OUTPATIENT
Start: 2022-05-21 | End: 2022-05-21

## 2022-05-21 RX ORDER — HYDROMORPHONE HYDROCHLORIDE 1 MG/ML
0.4 INJECTION, SOLUTION INTRAMUSCULAR; INTRAVENOUS; SUBCUTANEOUS EVERY 2 HOUR PRN
Status: DISCONTINUED | OUTPATIENT
Start: 2022-05-21 | End: 2022-05-23

## 2022-05-21 RX ORDER — METOCLOPRAMIDE HYDROCHLORIDE 5 MG/ML
10 INJECTION INTRAMUSCULAR; INTRAVENOUS EVERY 8 HOURS PRN
Status: DISCONTINUED | OUTPATIENT
Start: 2022-05-21 | End: 2022-05-24

## 2022-05-21 RX ORDER — HYDROMORPHONE HYDROCHLORIDE 1 MG/ML
0.5 INJECTION, SOLUTION INTRAMUSCULAR; INTRAVENOUS; SUBCUTANEOUS ONCE
Status: COMPLETED | OUTPATIENT
Start: 2022-05-21 | End: 2022-05-21

## 2022-05-21 RX ORDER — HYDROMORPHONE HYDROCHLORIDE 1 MG/ML
0.8 INJECTION, SOLUTION INTRAMUSCULAR; INTRAVENOUS; SUBCUTANEOUS EVERY 2 HOUR PRN
Status: DISCONTINUED | OUTPATIENT
Start: 2022-05-21 | End: 2022-05-23

## 2022-05-21 RX ORDER — SODIUM CHLORIDE 9 MG/ML
125 INJECTION, SOLUTION INTRAVENOUS CONTINUOUS
Status: DISCONTINUED | OUTPATIENT
Start: 2022-05-21 | End: 2022-05-21

## 2022-05-21 NOTE — ED QUICK NOTES
Orders for admission, patient is aware of plan and ready to go upstairs. Any questions, please call ED RN Doris Sicard  at extension [phone]. Vaccinated? Yes  Type of COVID test sent: Rapid   COVID Suspicion level: Low    Titratable drug(s) infusin.9 NS  Rate: 125/hr     LOC at time of transport:  A+Ox3     Other pertinent information: None     CIWA score= N/A  NIH score= N/A

## 2022-05-21 NOTE — PROGRESS NOTES
NURSING ADMISSION NOTE      Patient admitted via Cart   Oriented to room. Safety precautions initiated. Bed in low position. Call light in reach. The patient is A/O x4, on RA, no SOB, VSS. Admission navigator completed. Abdomen distended. PRN dilaudid given per MAR. UA c/s is pending. IVF - 0.9 NaCl @ 125ml/hr.

## 2022-05-22 LAB
ALBUMIN SERPL-MCNC: 3.3 G/DL (ref 3.4–5)
ALBUMIN/GLOB SERPL: 0.9 {RATIO} (ref 1–2)
ALP LIVER SERPL-CCNC: 50 U/L
ALT SERPL-CCNC: 17 U/L
ANION GAP SERPL CALC-SCNC: 8 MMOL/L (ref 0–18)
AST SERPL-CCNC: 5 U/L (ref 15–37)
BASOPHILS # BLD AUTO: 0.01 X10(3) UL (ref 0–0.2)
BASOPHILS NFR BLD AUTO: 0.1 %
BILIRUB SERPL-MCNC: 0.6 MG/DL (ref 0.1–2)
BUN BLD-MCNC: 15 MG/DL (ref 7–18)
CALCIUM BLD-MCNC: 9 MG/DL (ref 8.5–10.1)
CANCER AG19-9 SERPL-ACNC: 27.8 U/ML (ref ?–37)
CHLORIDE SERPL-SCNC: 101 MMOL/L (ref 98–112)
CO2 SERPL-SCNC: 25 MMOL/L (ref 21–32)
CREAT BLD-MCNC: 0.82 MG/DL
EOSINOPHIL # BLD AUTO: 0 X10(3) UL (ref 0–0.7)
EOSINOPHIL NFR BLD AUTO: 0 %
ERYTHROCYTE [DISTWIDTH] IN BLOOD BY AUTOMATED COUNT: 13.5 %
GLOBULIN PLAS-MCNC: 3.7 G/DL (ref 2.8–4.4)
GLUCOSE BLD-MCNC: 104 MG/DL (ref 70–99)
GLUCOSE BLD-MCNC: 99 MG/DL (ref 70–99)
HCT VFR BLD AUTO: 38.7 %
HGB BLD-MCNC: 13 G/DL
IMM GRANULOCYTES # BLD AUTO: 0.03 X10(3) UL (ref 0–1)
IMM GRANULOCYTES NFR BLD: 0.3 %
LYMPHOCYTES # BLD AUTO: 0.39 X10(3) UL (ref 1–4)
LYMPHOCYTES NFR BLD AUTO: 4.4 %
MAGNESIUM SERPL-MCNC: 2.1 MG/DL (ref 1.6–2.6)
MCH RBC QN AUTO: 32.9 PG (ref 26–34)
MCHC RBC AUTO-ENTMCNC: 33.6 G/DL (ref 31–37)
MCV RBC AUTO: 98 FL
MONOCYTES # BLD AUTO: 1.2 X10(3) UL (ref 0.1–1)
MONOCYTES NFR BLD AUTO: 13.5 %
NEUTROPHILS # BLD AUTO: 7.23 X10 (3) UL (ref 1.5–7.7)
NEUTROPHILS # BLD AUTO: 7.23 X10(3) UL (ref 1.5–7.7)
NEUTROPHILS NFR BLD AUTO: 81.7 %
OSMOLALITY SERPL CALC.SUM OF ELEC: 279 MOSM/KG (ref 275–295)
PLATELET # BLD AUTO: 154 10(3)UL (ref 150–450)
POTASSIUM SERPL-SCNC: 4.4 MMOL/L (ref 3.5–5.1)
PROT SERPL-MCNC: 7 G/DL (ref 6.4–8.2)
RBC # BLD AUTO: 3.95 X10(6)UL
SODIUM SERPL-SCNC: 134 MMOL/L (ref 136–145)
WBC # BLD AUTO: 8.9 X10(3) UL (ref 4–11)

## 2022-05-22 PROCEDURE — 99233 SBSQ HOSP IP/OBS HIGH 50: CPT | Performed by: HOSPITALIST

## 2022-05-22 RX ORDER — METOPROLOL TARTRATE 5 MG/5ML
5 INJECTION INTRAVENOUS EVERY 8 HOURS
Status: DISCONTINUED | OUTPATIENT
Start: 2022-05-22 | End: 2022-05-23

## 2022-05-22 RX ORDER — MAGNESIUM CARB/ALUMINUM HYDROX 105-160MG
296 TABLET,CHEWABLE ORAL ONCE
Status: COMPLETED | OUTPATIENT
Start: 2022-05-22 | End: 2022-05-22

## 2022-05-22 NOTE — PLAN OF CARE
A/ox4.  VSS. Patient c/o constipation, gi ordered meds. Meds given, after patient c/o nausea. Bowel sounds hypoactive. Patient states passing gas and started have loose stools later in the shift. Patient strict NPO. PRN pain med given x1 this shift. Patient encouraged to ambulate in the [place], he is steady.

## 2022-05-22 NOTE — PROGRESS NOTES
Pt A&Ox4 Room Air  Resting in bed  C/O 8/10 Pain Medicated per Mar  Abd with some tenderness per Pt  Last BM  5/17  UA (-)  PIV infusing [email or web address]  IV Metoprolol Q6  Lovenox SubQ   Strict NPO  GI to see  Voids Standby Assist

## 2022-05-23 LAB
ANION GAP SERPL CALC-SCNC: 6 MMOL/L (ref 0–18)
BASOPHILS # BLD AUTO: 0 X10(3) UL (ref 0–0.2)
BASOPHILS NFR BLD AUTO: 0 %
BUN BLD-MCNC: 14 MG/DL (ref 7–18)
CALCIUM BLD-MCNC: 8.9 MG/DL (ref 8.5–10.1)
CHLORIDE SERPL-SCNC: 101 MMOL/L (ref 98–112)
CO2 SERPL-SCNC: 27 MMOL/L (ref 21–32)
CREAT BLD-MCNC: 0.65 MG/DL
EOSINOPHIL # BLD AUTO: 0.02 X10(3) UL (ref 0–0.7)
EOSINOPHIL NFR BLD AUTO: 0.4 %
ERYTHROCYTE [DISTWIDTH] IN BLOOD BY AUTOMATED COUNT: 13.8 %
GLUCOSE BLD-MCNC: 74 MG/DL (ref 70–99)
HCT VFR BLD AUTO: 37.1 %
HGB BLD-MCNC: 12.2 G/DL
IMM GRANULOCYTES # BLD AUTO: 0.02 X10(3) UL (ref 0–1)
IMM GRANULOCYTES NFR BLD: 0.4 %
LYMPHOCYTES # BLD AUTO: 0.41 X10(3) UL (ref 1–4)
LYMPHOCYTES NFR BLD AUTO: 9 %
MCH RBC QN AUTO: 32.7 PG (ref 26–34)
MCHC RBC AUTO-ENTMCNC: 32.9 G/DL (ref 31–37)
MCV RBC AUTO: 99.5 FL
MONOCYTES # BLD AUTO: 0.63 X10(3) UL (ref 0.1–1)
MONOCYTES NFR BLD AUTO: 13.8 %
NEUTROPHILS # BLD AUTO: 3.48 X10 (3) UL (ref 1.5–7.7)
NEUTROPHILS # BLD AUTO: 3.48 X10(3) UL (ref 1.5–7.7)
NEUTROPHILS NFR BLD AUTO: 76.4 %
OSMOLALITY SERPL CALC.SUM OF ELEC: 277 MOSM/KG (ref 275–295)
PLATELET # BLD AUTO: 144 10(3)UL (ref 150–450)
POTASSIUM SERPL-SCNC: 4 MMOL/L (ref 3.5–5.1)
RBC # BLD AUTO: 3.73 X10(6)UL
SODIUM SERPL-SCNC: 134 MMOL/L (ref 136–145)
WBC # BLD AUTO: 4.6 X10(3) UL (ref 4–11)

## 2022-05-23 PROCEDURE — 99232 SBSQ HOSP IP/OBS MODERATE 35: CPT | Performed by: HOSPITALIST

## 2022-05-23 RX ORDER — MELATONIN
3 NIGHTLY
Status: DISCONTINUED | OUTPATIENT
Start: 2022-05-23 | End: 2022-05-24

## 2022-05-23 RX ORDER — METOPROLOL TARTRATE 5 MG/5ML
5 INJECTION INTRAVENOUS EVERY 8 HOURS
Status: COMPLETED | OUTPATIENT
Start: 2022-05-23 | End: 2022-05-24

## 2022-05-23 RX ORDER — PANTOPRAZOLE SODIUM 40 MG/1
40 TABLET, DELAYED RELEASE ORAL
Status: DISCONTINUED | OUTPATIENT
Start: 2022-05-24 | End: 2022-05-24

## 2022-05-23 RX ORDER — ENALAPRILAT 2.5 MG/2ML
0.62 INJECTION INTRAVENOUS EVERY 8 HOURS
Status: DISCONTINUED | OUTPATIENT
Start: 2022-05-23 | End: 2022-05-23

## 2022-05-23 RX ORDER — ATORVASTATIN CALCIUM 10 MG/1
10 TABLET, FILM COATED ORAL NIGHTLY
Status: DISCONTINUED | OUTPATIENT
Start: 2022-05-23 | End: 2022-05-24

## 2022-05-23 RX ORDER — ATENOLOL 25 MG/1
25 TABLET ORAL DAILY
Status: DISCONTINUED | OUTPATIENT
Start: 2022-05-24 | End: 2022-05-24

## 2022-05-23 RX ORDER — LEVOTHYROXINE SODIUM 0.12 MG/1
125 TABLET ORAL
Status: DISCONTINUED | OUTPATIENT
Start: 2022-05-24 | End: 2022-05-24

## 2022-05-23 RX ORDER — ENALAPRILAT 2.5 MG/2ML
0.62 INJECTION INTRAVENOUS EVERY 8 HOURS
Status: COMPLETED | OUTPATIENT
Start: 2022-05-23 | End: 2022-05-23

## 2022-05-23 RX ORDER — AMLODIPINE BESYLATE 5 MG/1
5 TABLET ORAL DAILY
Status: DISCONTINUED | OUTPATIENT
Start: 2022-05-24 | End: 2022-05-24

## 2022-05-23 NOTE — PLAN OF CARE
Assumed pt care at 0730. A&Ox4. VSS. Room air. NSR on tele. Denies pain currently, denies N/V. Strict NPO. R AC PIV infusing 0.9NS @ 125 mL/hr. Lovenox subcutaneous for VTE prevention. Voiding, up ad diana to bathroom. Frequent BM's after receiving mag citrate yesterday. Pt updated with POC.      Problem: Patient/Family Goals  Goal: Patient/Family Long Term Goal  Description: Patient's Long Term Goal: discharge   Interventions:  - GI consult  - IV fluids  - See additional Care Plan goals for specific interventions  Outcome: Progressing  Goal: Patient/Family Short Term Goal  Description: Patient's Short Term Goal: reduce abdominal pain  Interventions:   - NPO, IV fluids  - PRN pain meds  - See additional Care Plan goals for specific interventions  Outcome: Progressing

## 2022-05-24 VITALS
WEIGHT: 171 LBS | RESPIRATION RATE: 18 BRPM | HEART RATE: 78 BPM | HEIGHT: 69 IN | OXYGEN SATURATION: 92 % | BODY MASS INDEX: 25.33 KG/M2 | DIASTOLIC BLOOD PRESSURE: 59 MMHG | TEMPERATURE: 98 F | SYSTOLIC BLOOD PRESSURE: 133 MMHG

## 2022-05-24 PROCEDURE — 99238 HOSP IP/OBS DSCHRG MGMT 30/<: CPT | Performed by: HOSPITALIST

## 2022-05-24 RX ORDER — LEVOTHYROXINE SODIUM 0.12 MG/1
125 TABLET ORAL
Refills: 0 | Status: SHIPPED | COMMUNITY
Start: 2022-05-25

## 2022-05-24 NOTE — PLAN OF CARE
Assumed pt care at 0730. A&Ox4, hard of hearing. VSS. Room air. NSR on tele. Denies pain currently, denies N/V. Low fiber/low fat diet. R AC PIV SL. Lovenox subcutaneous for VTE prevention. Voiding, up ad diana to bathroom. DC likely today. Pt updated with POC.      Problem: Patient/Family Goals  Goal: Patient/Family Long Term Goal  Description: Patient's Long Term Goal: discharge   Interventions:  - GI consult  - IV fluids  - See additional Care Plan goals for specific interventions  Outcome: Progressing  Goal: Patient/Family Short Term Goal  Description: Patient's Short Term Goal: reduce abdominal pain  Interventions:   - NPO, IV fluids  - PRN pain meds  - See additional Care Plan goals for specific interventions  Outcome: Progressing     Problem: GASTROINTESTINAL - ADULT  Goal: Minimal or absence of nausea and vomiting  Description: INTERVENTIONS:  - Maintain adequate hydration with IV or PO as ordered and tolerated  - Nasogastric tube to low intermittent suction as ordered  - Evaluate effectiveness of ordered antiemetic medications  - Provide nonpharmacologic comfort measures as appropriate  - Advance diet as tolerated, if ordered  - Obtain nutritional consult as needed  - Evaluate fluid balance  Outcome: Progressing  Goal: Maintains or returns to baseline bowel function  Description: INTERVENTIONS:  - Assess bowel function  - Maintain adequate hydration with IV or PO as ordered and tolerated  - Evaluate effectiveness of GI medications  - Encourage mobilization and activity  - Obtain nutritional consult as needed  - Establish a toileting routine/schedule  - Consider collaborating with pharmacy to review patient's medication profile  Outcome: Progressing

## 2022-05-24 NOTE — PLAN OF CARE
NURSING DISCHARGE NOTE    Discharged Home via Ambulatory. Accompanied by Spouse  Belongings Taken by patient/family. Pt discharged in calm, stable status to home. 3 CD copies of CT abdomen/pelvis provided. GI consult note and progress notes printed for pt per Dr. Jane Liter request. PIV removed. Discharge paperwork provided & discussed, pt verbalized understanding. No paper prescriptions to provide.

## 2022-05-24 NOTE — PLAN OF CARE
A&Ox4, hard of hearing. On room air, lungs clear. Abdomen soft and distender, nontender, BS active. + BM. Denies N&V. Tolerating full liquid diet- will advance to low fiber/soft for breakfast. Denies pain or discomfort. Right AC PIV saline locked. Up ad diana. NSR on tele. Needs met. Will monitor.     Problem: Patient/Family Goals  Goal: Patient/Family Long Term Goal  Description: Patient's Long Term Goal: discharge     Interventions:  - GI consult  - IV fluids  - See additional Care Plan goals for specific interventions  Outcome: Progressing  Goal: Patient/Family Short Term Goal  Description: Patient's Short Term Goal: reduce abdominal pain    Interventions:   - NPO, IV fluids  - PRN pain meds  - See additional Care Plan goals for specific interventions  Outcome: Progressing     Problem: GASTROINTESTINAL - ADULT  Goal: Minimal or absence of nausea and vomiting  Description: INTERVENTIONS:  - Maintain adequate hydration with IV or PO as ordered and tolerated  - Nasogastric tube to low intermittent suction as ordered  - Evaluate effectiveness of ordered antiemetic medications  - Provide nonpharmacologic comfort measures as appropriate  - Advance diet as tolerated, if ordered  - Obtain nutritional consult as needed  - Evaluate fluid balance  Outcome: Progressing  Goal: Maintains or returns to baseline bowel function  Description: INTERVENTIONS:  - Assess bowel function  - Maintain adequate hydration with IV or PO as ordered and tolerated  - Evaluate effectiveness of GI medications  - Encourage mobilization and activity  - Obtain nutritional consult as needed  - Establish a toileting routine/schedule  - Consider collaborating with pharmacy to review patient's medication profile  Outcome: Progressing

## 2022-06-14 ENCOUNTER — APPOINTMENT (RX ONLY)
Dept: URBAN - METROPOLITAN AREA CLINIC 151 | Facility: CLINIC | Age: 77
Setting detail: DERMATOLOGY
End: 2022-06-14

## 2022-06-14 DIAGNOSIS — D69.2 OTHER NONTHROMBOCYTOPENIC PURPURA: ICD-10-CM

## 2022-06-14 DIAGNOSIS — L24.9 IRRITANT CONTACT DERMATITIS, UNSPECIFIED CAUSE: ICD-10-CM

## 2022-06-14 DIAGNOSIS — L21.8 OTHER SEBORRHEIC DERMATITIS: ICD-10-CM | Status: WORSENING

## 2022-06-14 DIAGNOSIS — Z71.89 OTHER SPECIFIED COUNSELING: ICD-10-CM

## 2022-06-14 DIAGNOSIS — L81.4 OTHER MELANIN HYPERPIGMENTATION: ICD-10-CM

## 2022-06-14 DIAGNOSIS — Z86.007 PERSONAL HISTORY OF IN-SITU NEOPLASM OF SKIN: ICD-10-CM

## 2022-06-14 DIAGNOSIS — Z85.828 PERSONAL HISTORY OF OTHER MALIGNANT NEOPLASM OF SKIN: ICD-10-CM

## 2022-06-14 DIAGNOSIS — L57.0 ACTINIC KERATOSIS: ICD-10-CM

## 2022-06-14 DIAGNOSIS — L20.89 OTHER ATOPIC DERMATITIS: ICD-10-CM | Status: WORSENING

## 2022-06-14 PROBLEM — L20.84 INTRINSIC (ALLERGIC) ECZEMA: Status: ACTIVE | Noted: 2022-06-14

## 2022-06-14 PROCEDURE — 17000 DESTRUCT PREMALG LESION: CPT

## 2022-06-14 PROCEDURE — ? PRESCRIPTION

## 2022-06-14 PROCEDURE — ? PRESCRIPTION MEDICATION MANAGEMENT

## 2022-06-14 PROCEDURE — ? COUNSELING

## 2022-06-14 PROCEDURE — 99214 OFFICE O/P EST MOD 30 MIN: CPT | Mod: 25

## 2022-06-14 PROCEDURE — ? FULL BODY SKIN EXAM

## 2022-06-14 PROCEDURE — ? LIQUID NITROGEN

## 2022-06-14 RX ORDER — MOMETASONE FUROATE 1 MG/ML
SOLUTION TOPICAL
Qty: 60 | Refills: 2 | Status: ERX | COMMUNITY
Start: 2022-06-14

## 2022-06-14 RX ORDER — FLUOROURACIL 2 G/40G
CREAM TOPICAL BID
Qty: 40 | Refills: 1 | Status: ERX | COMMUNITY
Start: 2022-06-14

## 2022-06-14 RX ADMIN — FLUOROURACIL 1: 2 CREAM TOPICAL at 00:00

## 2022-06-14 RX ADMIN — MOMETASONE FUROATE 1: 1 SOLUTION TOPICAL at 00:00

## 2022-06-14 ASSESSMENT — LOCATION SIMPLE DESCRIPTION DERM
LOCATION SIMPLE: RIGHT SCALP
LOCATION SIMPLE: RIGHT SHOULDER
LOCATION SIMPLE: LEFT SHOULDER
LOCATION SIMPLE: LEFT SCALP
LOCATION SIMPLE: SCALP
LOCATION SIMPLE: RIGHT UPPER ARM
LOCATION SIMPLE: LEFT CLAVICULAR SKIN
LOCATION SIMPLE: RIGHT CLAVICULAR SKIN
LOCATION SIMPLE: CHEST
LOCATION SIMPLE: RIGHT FOREARM
LOCATION SIMPLE: LEFT THIGH
LOCATION SIMPLE: RIGHT THIGH
LOCATION SIMPLE: RIGHT CHEEK
LOCATION SIMPLE: LEFT FOREARM

## 2022-06-14 ASSESSMENT — LOCATION DETAILED DESCRIPTION DERM
LOCATION DETAILED: RIGHT ANTERIOR PROXIMAL UPPER ARM
LOCATION DETAILED: LEFT SUPERIOR PARIETAL SCALP
LOCATION DETAILED: RIGHT POSTERIOR SHOULDER
LOCATION DETAILED: LEFT DISTAL DORSAL FOREARM
LOCATION DETAILED: RIGHT SUPERIOR PARIETAL SCALP
LOCATION DETAILED: LEFT ANTERIOR SHOULDER
LOCATION DETAILED: RIGHT VENTRAL PROXIMAL FOREARM
LOCATION DETAILED: RIGHT CLAVICULAR SKIN
LOCATION DETAILED: RIGHT SUPERIOR MEDIAL MALAR CHEEK
LOCATION DETAILED: LEFT POSTERIOR SHOULDER
LOCATION DETAILED: LEFT CLAVICULAR SKIN
LOCATION DETAILED: RIGHT ANTERIOR SHOULDER
LOCATION DETAILED: LEFT ANTERIOR PROXIMAL THIGH
LOCATION DETAILED: RIGHT ANTERIOR PROXIMAL THIGH
LOCATION DETAILED: RIGHT LATERAL SUPERIOR CHEST
LOCATION DETAILED: RIGHT CENTRAL FRONTAL SCALP
LOCATION DETAILED: LEFT CENTRAL FRONTAL SCALP

## 2022-06-14 ASSESSMENT — LOCATION ZONE DERM
LOCATION ZONE: SCALP
LOCATION ZONE: FACE
LOCATION ZONE: TRUNK
LOCATION ZONE: LEG
LOCATION ZONE: ARM

## 2022-06-14 NOTE — PROCEDURE: PRESCRIPTION MEDICATION MANAGEMENT
Detail Level: Zone
Render In Strict Bullet Format?: No
Initiate Treatment: Otc Benadryl
Initiate Treatment: OTC hydrocortisone cream

## 2022-12-14 ENCOUNTER — APPOINTMENT (RX ONLY)
Dept: URBAN - METROPOLITAN AREA CLINIC 151 | Facility: CLINIC | Age: 77
Setting detail: DERMATOLOGY
End: 2022-12-14

## 2022-12-14 DIAGNOSIS — Z86.007 PERSONAL HISTORY OF IN-SITU NEOPLASM OF SKIN: ICD-10-CM

## 2022-12-14 DIAGNOSIS — L57.0 ACTINIC KERATOSIS: ICD-10-CM

## 2022-12-14 DIAGNOSIS — I83.9 ASYMPTOMATIC VARICOSE VEINS OF LOWER EXTREMITIES: ICD-10-CM

## 2022-12-14 DIAGNOSIS — Z71.89 OTHER SPECIFIED COUNSELING: ICD-10-CM

## 2022-12-14 DIAGNOSIS — L21.8 OTHER SEBORRHEIC DERMATITIS: ICD-10-CM

## 2022-12-14 DIAGNOSIS — Z85.828 PERSONAL HISTORY OF OTHER MALIGNANT NEOPLASM OF SKIN: ICD-10-CM

## 2022-12-14 PROBLEM — I83.91 ASYMPTOMATIC VARICOSE VEINS OF RIGHT LOWER EXTREMITY: Status: ACTIVE | Noted: 2022-12-14

## 2022-12-14 PROCEDURE — ? COUNSELING

## 2022-12-14 PROCEDURE — ? LIQUID NITROGEN

## 2022-12-14 PROCEDURE — ? PRESCRIPTION

## 2022-12-14 PROCEDURE — 17000 DESTRUCT PREMALG LESION: CPT

## 2022-12-14 PROCEDURE — ? FULL BODY SKIN EXAM

## 2022-12-14 PROCEDURE — 99214 OFFICE O/P EST MOD 30 MIN: CPT | Mod: 25

## 2022-12-14 PROCEDURE — 17003 DESTRUCT PREMALG LES 2-14: CPT

## 2022-12-14 PROCEDURE — ? ORDER FOR PHOTODYNAMIC THERAPY

## 2022-12-14 RX ORDER — BETAMETHASONE DIPROPIONATE 0.5 MG/ML
LOTION TOPICAL
Qty: 60 | Refills: 1 | Status: ERX

## 2022-12-14 RX ORDER — CLOBETASOL PROPIONATE 0.5 MG/ML
SOLUTION TOPICAL
Qty: 50 | Refills: 3 | Status: ERX | COMMUNITY
Start: 2022-12-14

## 2022-12-14 RX ADMIN — CLOBETASOL PROPIONATE 1: 0.5 SOLUTION TOPICAL at 00:00

## 2022-12-14 ASSESSMENT — LOCATION ZONE DERM
LOCATION ZONE: SCALP
LOCATION ZONE: HAND
LOCATION ZONE: ARM
LOCATION ZONE: TRUNK
LOCATION ZONE: LEG

## 2022-12-14 ASSESSMENT — LOCATION SIMPLE DESCRIPTION DERM
LOCATION SIMPLE: RIGHT UPPER BACK
LOCATION SIMPLE: LEFT SHOULDER
LOCATION SIMPLE: LEFT FOREARM
LOCATION SIMPLE: RIGHT UPPER ARM
LOCATION SIMPLE: CHEST
LOCATION SIMPLE: LEFT HAND
LOCATION SIMPLE: RIGHT CALF
LOCATION SIMPLE: SCALP
LOCATION SIMPLE: LEFT WRIST
LOCATION SIMPLE: RIGHT FOREARM

## 2022-12-14 ASSESSMENT — LOCATION DETAILED DESCRIPTION DERM
LOCATION DETAILED: RIGHT DISTAL DORSAL FOREARM
LOCATION DETAILED: LEFT CENTRAL PARIETAL SCALP
LOCATION DETAILED: RIGHT LATERAL SUPERIOR CHEST
LOCATION DETAILED: RIGHT PROXIMAL DORSAL FOREARM
LOCATION DETAILED: RIGHT ANTERIOR PROXIMAL UPPER ARM
LOCATION DETAILED: LEFT DISTAL DORSAL FOREARM
LOCATION DETAILED: LEFT DORSAL WRIST
LOCATION DETAILED: LEFT ULNAR DORSAL HAND
LOCATION DETAILED: LEFT POSTERIOR SHOULDER
LOCATION DETAILED: RIGHT DISTAL CALF
LOCATION DETAILED: RIGHT SUPERIOR UPPER BACK

## 2022-12-14 NOTE — PROCEDURE: ORDER FOR PHOTODYNAMIC THERAPY
Location: Face and Ears
Face And Ears Incubation Time: 1.5 hours
Arms Incubation Time: 2 Hours
Debridement: No
Face And Neck Incubation Time: 1 Hour
Detail Level: Simple
Frequency Of Pdt: Single Treatment
Pdt Type: AIMEE-U
Legs Incubation Time: overnight
Consent: The procedure and risks were reviewed with the patient including but not limited to: burning, pigmentary changes, pain, blistering, scabbing, redness, and the possibility of needing numerous treatments. Strict photoprotection after the procedure was also discussed.
Photosensitizer: Levulan
Face And Scalp Incubation Time: 1 Hour for the face and 2 Hours for the scalp

## 2022-12-27 ENCOUNTER — OFFICE VISIT (OUTPATIENT)
Dept: FAMILY MEDICINE CLINIC | Facility: CLINIC | Age: 77
End: 2022-12-27
Payer: COMMERCIAL

## 2022-12-27 VITALS
HEART RATE: 75 BPM | WEIGHT: 167 LBS | OXYGEN SATURATION: 98 % | BODY MASS INDEX: 24.73 KG/M2 | DIASTOLIC BLOOD PRESSURE: 73 MMHG | RESPIRATION RATE: 16 BRPM | SYSTOLIC BLOOD PRESSURE: 114 MMHG | HEIGHT: 69 IN | TEMPERATURE: 97 F

## 2022-12-27 DIAGNOSIS — R05.9 COUGH, UNSPECIFIED TYPE: Primary | ICD-10-CM

## 2022-12-27 DIAGNOSIS — R06.2 BILATERAL WHEEZING: ICD-10-CM

## 2022-12-27 DIAGNOSIS — J04.0 LARYNGITIS: ICD-10-CM

## 2022-12-27 PROCEDURE — 87637 SARSCOV2&INF A&B&RSV AMP PRB: CPT | Performed by: NURSE PRACTITIONER

## 2022-12-27 PROCEDURE — 3078F DIAST BP <80 MM HG: CPT | Performed by: NURSE PRACTITIONER

## 2022-12-27 PROCEDURE — 99202 OFFICE O/P NEW SF 15 MIN: CPT | Performed by: NURSE PRACTITIONER

## 2022-12-27 PROCEDURE — 3074F SYST BP LT 130 MM HG: CPT | Performed by: NURSE PRACTITIONER

## 2022-12-27 PROCEDURE — 3008F BODY MASS INDEX DOCD: CPT | Performed by: NURSE PRACTITIONER

## 2022-12-27 NOTE — PATIENT INSTRUCTIONS
Finish Levaquin (antibiotic) as directed. Use proair Respiclick 1-2 puffs every 6 hours as needed for cough/wheezing. Hydrate well, cool mist or humidifier inhaled for sleep, hot steam for congestion  Use warm wet washcloth in shower to face for mobilizing drainage in sinuses  May try Benzonatate again for cough/sore throat, take every 8 hours as needed for cough, stop if not helping  Follow up with PCP if symptoms persist. Seek emergency care if symptoms significantly worsen despite treatment.

## 2022-12-28 LAB
FLUAV + FLUBV RNA SPEC NAA+PROBE: NOT DETECTED
FLUAV + FLUBV RNA SPEC NAA+PROBE: NOT DETECTED
RSV RNA SPEC NAA+PROBE: NOT DETECTED
SARS-COV-2 RNA RESP QL NAA+PROBE: NOT DETECTED

## 2023-01-18 ENCOUNTER — APPOINTMENT (RX ONLY)
Dept: URBAN - METROPOLITAN AREA CLINIC 151 | Facility: CLINIC | Age: 78
Setting detail: DERMATOLOGY
End: 2023-01-18

## 2023-01-18 DIAGNOSIS — L57.0 ACTINIC KERATOSIS: ICD-10-CM

## 2023-01-18 PROCEDURE — ? OTHER

## 2023-01-18 PROCEDURE — ? PRESCRIPTION

## 2023-01-18 PROCEDURE — ? PDT: BLUE

## 2023-01-18 PROCEDURE — ? COUNSELING

## 2023-01-18 PROCEDURE — ? OBSERVATION

## 2023-01-18 PROCEDURE — 96574 DBRDMT PRMLG LES W/PDT: CPT

## 2023-01-18 RX ORDER — FLUOROURACIL 40 MG/G
CREAM TOPICAL
Qty: 40 | Refills: 1 | Status: ERX | COMMUNITY
Start: 2023-01-18

## 2023-01-18 RX ADMIN — FLUOROURACIL 1: 40 CREAM TOPICAL at 00:00

## 2023-01-18 ASSESSMENT — LOCATION ZONE DERM
LOCATION ZONE: EAR
LOCATION ZONE: SCALP
LOCATION ZONE: FACE

## 2023-01-18 ASSESSMENT — LOCATION DETAILED DESCRIPTION DERM
LOCATION DETAILED: RIGHT SUPERIOR HELIX
LOCATION DETAILED: LEFT MEDIAL FRONTAL SCALP
LOCATION DETAILED: SUPERIOR MID FOREHEAD
LOCATION DETAILED: LEFT SUPERIOR HELIX
LOCATION DETAILED: RIGHT CENTRAL FRONTAL SCALP

## 2023-01-18 ASSESSMENT — LOCATION SIMPLE DESCRIPTION DERM
LOCATION SIMPLE: LEFT SCALP
LOCATION SIMPLE: SUPERIOR FOREHEAD
LOCATION SIMPLE: RIGHT EAR
LOCATION SIMPLE: LEFT EAR
LOCATION SIMPLE: SCALP

## 2023-01-18 NOTE — PROCEDURE: PDT: BLUE
Who Performed The Pdt?: Performed by MD NAN, AL or DEVANTE with Pre-Procedure Debridement of Hyperkeratotic Lesions (58342) Who Performed The Pdt?: Performed by MD NAN, AL or DEVANTE with Pre-Procedure Debridement of Hyperkeratotic Lesions ([id number])

## 2023-01-18 NOTE — PROCEDURE: OTHER
Other (Free Text): Pt unable to tolerate PDT blue light
Detail Level: Zone
Note Text (......Xxx Chief Complaint.): This diagnosis correlates with the
Render Risk Assessment In Note?: no

## 2023-03-03 ENCOUNTER — APPOINTMENT (RX ONLY)
Dept: URBAN - METROPOLITAN AREA CLINIC 151 | Facility: CLINIC | Age: 78
Setting detail: DERMATOLOGY
End: 2023-03-03

## 2023-03-03 DIAGNOSIS — L57.0 ACTINIC KERATOSIS: ICD-10-CM

## 2023-03-03 DIAGNOSIS — L23.9 ALLERGIC CONTACT DERMATITIS, UNSPECIFIED CAUSE: ICD-10-CM

## 2023-03-03 PROCEDURE — 17000 DESTRUCT PREMALG LESION: CPT

## 2023-03-03 PROCEDURE — ? OBSERVATION

## 2023-03-03 PROCEDURE — ? FULL BODY SKIN EXAM - DECLINED

## 2023-03-03 PROCEDURE — ? LIQUID NITROGEN

## 2023-03-03 PROCEDURE — ? COUNSELING

## 2023-03-03 PROCEDURE — ? TREATMENT REGIMEN

## 2023-03-03 PROCEDURE — 99213 OFFICE O/P EST LOW 20 MIN: CPT | Mod: 25

## 2023-03-03 PROCEDURE — 17003 DESTRUCT PREMALG LES 2-14: CPT

## 2023-03-03 ASSESSMENT — LOCATION SIMPLE DESCRIPTION DERM
LOCATION SIMPLE: LEFT LOWER BACK
LOCATION SIMPLE: RIGHT FOREHEAD
LOCATION SIMPLE: LEFT FOREHEAD
LOCATION SIMPLE: LEFT EYEBROW

## 2023-03-03 ASSESSMENT — LOCATION DETAILED DESCRIPTION DERM
LOCATION DETAILED: LEFT SUPERIOR FOREHEAD
LOCATION DETAILED: LEFT INFERIOR LATERAL MIDBACK
LOCATION DETAILED: LEFT CENTRAL EYEBROW
LOCATION DETAILED: LEFT LATERAL FOREHEAD
LOCATION DETAILED: LEFT FOREHEAD
LOCATION DETAILED: RIGHT INFERIOR LATERAL FOREHEAD

## 2023-03-03 ASSESSMENT — LOCATION ZONE DERM
LOCATION ZONE: TRUNK
LOCATION ZONE: FACE

## 2023-09-18 ENCOUNTER — APPOINTMENT (RX ONLY)
Dept: URBAN - METROPOLITAN AREA CLINIC 151 | Facility: CLINIC | Age: 78
Setting detail: DERMATOLOGY
End: 2023-09-18

## 2023-09-18 DIAGNOSIS — L57.0 ACTINIC KERATOSIS: ICD-10-CM | Status: INADEQUATELY CONTROLLED

## 2023-09-18 DIAGNOSIS — L81.4 OTHER MELANIN HYPERPIGMENTATION: ICD-10-CM

## 2023-09-18 DIAGNOSIS — D22 MELANOCYTIC NEVI: ICD-10-CM

## 2023-09-18 DIAGNOSIS — L82.1 OTHER SEBORRHEIC KERATOSIS: ICD-10-CM

## 2023-09-18 DIAGNOSIS — L738 OTHER SPECIFIED DISEASES OF HAIR AND HAIR FOLLICLES: ICD-10-CM | Status: INADEQUATELY CONTROLLED

## 2023-09-18 DIAGNOSIS — L73.9 FOLLICULAR DISORDER, UNSPECIFIED: ICD-10-CM | Status: INADEQUATELY CONTROLLED

## 2023-09-18 DIAGNOSIS — Z85.828 PERSONAL HISTORY OF OTHER MALIGNANT NEOPLASM OF SKIN: ICD-10-CM

## 2023-09-18 DIAGNOSIS — L663 OTHER SPECIFIED DISEASES OF HAIR AND HAIR FOLLICLES: ICD-10-CM | Status: INADEQUATELY CONTROLLED

## 2023-09-18 PROBLEM — L02.92 FURUNCLE, UNSPECIFIED: Status: ACTIVE | Noted: 2023-09-18

## 2023-09-18 PROBLEM — D22.5 MELANOCYTIC NEVI OF TRUNK: Status: ACTIVE | Noted: 2023-09-18

## 2023-09-18 PROBLEM — C44.41 BASAL CELL CARCINOMA OF SKIN OF SCALP AND NECK: Status: ACTIVE | Noted: 2023-09-18

## 2023-09-18 PROBLEM — C44.42 SQUAMOUS CELL CARCINOMA OF SKIN OF SCALP AND NECK: Status: ACTIVE | Noted: 2023-09-18

## 2023-09-18 PROCEDURE — ? LIQUID NITROGEN

## 2023-09-18 PROCEDURE — ? TREATMENT REGIMEN

## 2023-09-18 PROCEDURE — ? DEFER

## 2023-09-18 PROCEDURE — ? FULL BODY SKIN EXAM

## 2023-09-18 PROCEDURE — ? ADDITIONAL NOTES

## 2023-09-18 PROCEDURE — ? COUNSELING

## 2023-09-18 PROCEDURE — 17000 DESTRUCT PREMALG LESION: CPT

## 2023-09-18 PROCEDURE — 17003 DESTRUCT PREMALG LES 2-14: CPT

## 2023-09-18 PROCEDURE — ? PRESCRIPTION MEDICATION MANAGEMENT

## 2023-09-18 PROCEDURE — 99213 OFFICE O/P EST LOW 20 MIN: CPT | Mod: 25

## 2023-09-18 ASSESSMENT — LOCATION DETAILED DESCRIPTION DERM
LOCATION DETAILED: LEFT MEDIAL UPPER BACK
LOCATION DETAILED: LEFT INFERIOR MEDIAL UPPER BACK
LOCATION DETAILED: RIGHT CENTRAL MALAR CHEEK
LOCATION DETAILED: LEFT SUPERIOR MEDIAL UPPER BACK
LOCATION DETAILED: RIGHT CLAVICULAR NECK

## 2023-09-18 ASSESSMENT — LOCATION SIMPLE DESCRIPTION DERM
LOCATION SIMPLE: RIGHT CHEEK
LOCATION SIMPLE: RIGHT ANTERIOR NECK
LOCATION SIMPLE: LEFT UPPER BACK

## 2023-09-18 ASSESSMENT — LOCATION ZONE DERM
LOCATION ZONE: FACE
LOCATION ZONE: NECK
LOCATION ZONE: TRUNK

## 2023-09-18 NOTE — PROCEDURE: DEFER
Size Of Lesion In Cm (Optional): 0
Procedure To Be Performed At Next Visit: Mohs surgery
Detail Level: Detailed
Introduction Text (Please End With A Colon): The following procedure was deferred: excision

## 2023-09-18 NOTE — PROCEDURE: TREATMENT REGIMEN
Detail Level: Zone
Plan: Use Sunscreen SPF 35 or greater to sun exposed areas daily.
Plan: He is scheduled for excision in 3 weeks.
Otc Regimen: T Sal
Continue Regimen: Efudex to face

## 2023-09-28 ENCOUNTER — APPOINTMENT (RX ONLY)
Dept: URBAN - METROPOLITAN AREA CLINIC 150 | Facility: CLINIC | Age: 78
Setting detail: DERMATOLOGY
End: 2023-09-28

## 2023-09-28 PROBLEM — C44.41 BASAL CELL CARCINOMA OF SKIN OF SCALP AND NECK: Status: ACTIVE | Noted: 2023-09-28

## 2023-09-28 PROCEDURE — ? MOHS SURGERY

## 2023-09-28 PROCEDURE — 17311 MOHS 1 STAGE H/N/HF/G: CPT | Mod: 79

## 2023-09-28 PROCEDURE — 12032 INTMD RPR S/A/T/EXT 2.6-7.5: CPT | Mod: 79

## 2023-09-28 PROCEDURE — ? COUNSELING

## 2023-09-28 NOTE — PROCEDURE: COUNSELING
Detail Level: Detailed
Detail Level: Detailed
Size Of Lesion: 0.8
X Size Of Lesion In Cm (Optional): 0.6
Name Of The Referring Provider For Procedure: Elisabeth Beasley PA-C
Incorporate Mauc In Note: Yes
Location Indication Override (Is Already Calculated Based On Selected Body Location): Area M

## 2023-09-28 NOTE — PROCEDURE: MOHS SURGERY
Add 82941 Cpt? (Important Note: In 2017 The Use Of 68579 Is Being Tracked By Cms To Determine Future Global Period Reimbursement For Global Periods): yes Detail Level: Detailed O-Z Flap Text: The defect edges were debeveled with a #15 scalpel blade. Given the location of the defect, shape of the defect and the proximity to free margins an O-Z flap was deemed most appropriate. Using a sterile surgical marker, an appropriate transposition flap was drawn incorporating the defect and placing the expected incisions within the relaxed skin tension lines where possible. The area thus outlined was incised deep to adipose tissue with a #15 scalpel blade. The skin margins were undermined to an appropriate distance in all directions utilizing iris scissors. Following this, the designed flap was carried over into the primary defect and sutured into place.

## 2023-09-28 NOTE — PROCEDURE: MOHS SURGERY
Lab at bedside drawing anti xa   Estlander Flap (Upper To Lower Lip) Text: The defect of the lower lip was assessed and measured.  Given the location and size of the defect, an Estlander flap was deemed most appropriate. Using a sterile surgical marker, an appropriate Estlander flap was measured and drawn on the upper lip. Local anesthesia was then infiltrated. A scalpel was then used to incise the lateral aspect of the flap, through skin, muscle and mucosa, leaving the flap pedicled medially.  The flap was then rotated and positioned to fill the lower lip defect.  The flap was then sutured into place with a three layer technique, closing the orbicularis oris muscle layer with subcutaneous buried sutures, followed by a mucosal layer and an epidermal layer.

## 2023-09-28 NOTE — PROCEDURE: MIPS QUALITY
Quality 111:Pneumonia Vaccination Status For Older Adults: Patient received any pneumococcal conjugate or polysaccharide vaccine on or after their 60th birthday and before the end of the measurement period
Quality 226: Preventive Care And Screening: Tobacco Use: Screening And Cessation Intervention: Tobacco Screening not Performed
Quality 47: Advance Care Plan: Advance Care Planning discussed and documented; advance care plan or surrogate decision maker documented in the medical record.
Quality 431: Preventive Care And Screening: Unhealthy Alcohol Use - Screening: Patient not identified as an unhealthy alcohol user when screened for unhealthy alcohol use using a systematic screening method
Detail Level: Detailed
Quality 402: Tobacco Use And Help With Quitting Among Adolescents: Patient screened for tobacco and never smoked
Quality 130: Documentation Of Current Medications In The Medical Record: Current Medications Documented

## 2023-09-28 NOTE — PROCEDURE: MOHS SURGERY
-- DO NOT REPLY / DO NOT REPLY ALL --  -- Message is from the [facility]--    Order Request  DME - Durable Medical Supply - Human  for medical supply approval    Message / reason: Patient needs this for the medical supplies for CPAP machine.  He would also like to have it with no expiration date because he needs these supplies every 3 months.    Insurance type:  [employer]  Payor:  [employer] MEDICARE ADVANTAGE / [facility] / Product Type:  MEDICARE ADVANTAGE    Preferred Delivery Method   Call when ready for pickup - phone number to notify: [phone]     Caller Information       Type Contact Phone    06/08/2021 01:44 PM CDT Phone (Incoming) Marcial Goldberg (Self) [phone] (M)          Alternative phone number: None    Turnaround time given to caller:   \"This message will be sent to [state Provider's name]. The clinical team will fulfill your request as soon as they review your message.\"   Complex Repair And Flap Additional Text (Will Appearing After The Standard Complex Repair Text): The complex repair was not sufficient to completely close the primary defect. The remaining additional defect was repaired with the flap mentioned below.

## 2023-09-28 NOTE — HPI: SKIN LESION (BASAL CELL CARCINOMA)
How Severe Is Your Skin Cancer?: mild
Is This A New Presentation, Or A Follow-Up?: Follow Up Basal Cell Carcinoma
When Was Basal Cell Biopsied? (Optional): 08/31/2023
Accession # (Optional): [id number]
Location From Outside Provider (Will Override Previously Chosen Location): Left postauricular

## 2023-09-28 NOTE — PROCEDURE: MOHS SURGERY
normal Provider Procedure Text (A): After obtaining clear surgical margins the defect was repaired by another provider.

## 2023-10-09 ENCOUNTER — APPOINTMENT (RX ONLY)
Dept: URBAN - METROPOLITAN AREA CLINIC 151 | Facility: CLINIC | Age: 78
Setting detail: DERMATOLOGY
End: 2023-10-09

## 2023-10-09 DIAGNOSIS — Z48.02 ENCOUNTER FOR REMOVAL OF SUTURES: ICD-10-CM

## 2023-10-09 PROCEDURE — 99024 POSTOP FOLLOW-UP VISIT: CPT

## 2023-10-09 PROCEDURE — ? SUTURE REMOVAL (GLOBAL PERIOD)

## 2023-10-09 ASSESSMENT — LOCATION ZONE DERM: LOCATION ZONE: SCALP

## 2023-10-09 ASSESSMENT — LOCATION SIMPLE DESCRIPTION DERM: LOCATION SIMPLE: SCALP

## 2023-10-09 ASSESSMENT — LOCATION DETAILED DESCRIPTION DERM: LOCATION DETAILED: LEFT INFERIOR FRONTAL SCALP

## 2023-10-09 NOTE — PROCEDURE: SUTURE REMOVAL (GLOBAL PERIOD)
Detail Level: Detailed
Add 18610 Cpt? (Important Note: In 2017 The Use Of 78732 Is Being Tracked By Cms To Determine Future Global Period Reimbursement For Global Periods): yes

## 2023-10-30 ENCOUNTER — APPOINTMENT (RX ONLY)
Dept: URBAN - METROPOLITAN AREA CLINIC 151 | Facility: CLINIC | Age: 78
Setting detail: DERMATOLOGY
End: 2023-10-30

## 2023-10-30 DIAGNOSIS — Z48.02 ENCOUNTER FOR REMOVAL OF SUTURES: ICD-10-CM

## 2023-10-30 PROCEDURE — ? OTHER

## 2023-10-30 PROCEDURE — 99024 POSTOP FOLLOW-UP VISIT: CPT

## 2023-10-30 PROCEDURE — ? SUTURE REMOVAL (GLOBAL PERIOD)

## 2023-10-30 ASSESSMENT — LOCATION ZONE DERM: LOCATION ZONE: NECK

## 2023-10-30 ASSESSMENT — LOCATION SIMPLE DESCRIPTION DERM: LOCATION SIMPLE: POSTERIOR NECK

## 2023-10-30 ASSESSMENT — LOCATION DETAILED DESCRIPTION DERM: LOCATION DETAILED: RIGHT LATERAL TRAPEZIAL NECK

## 2023-10-30 NOTE — PROCEDURE: OTHER
Other (Free Text): Sutures removed by Adam Del Real LPN. Dr. Vogt never entered the room.
Render Risk Assessment In Note?: no
Note Text (......Xxx Chief Complaint.): This diagnosis correlates with the
Detail Level: Zone

## 2023-10-30 NOTE — PROCEDURE: SUTURE REMOVAL (GLOBAL PERIOD)
Detail Level: Detailed
Add 15279 Cpt? (Important Note: In 2017 The Use Of 48697 Is Being Tracked By Cms To Determine Future Global Period Reimbursement For Global Periods): yes

## 2024-01-26 ENCOUNTER — TELEPHONE (OUTPATIENT)
Dept: HEMATOLOGY/ONCOLOGY | Age: 79
End: 2024-01-26

## 2024-02-26 ENCOUNTER — APPOINTMENT (RX ONLY)
Dept: URBAN - METROPOLITAN AREA CLINIC 151 | Facility: CLINIC | Age: 79
Setting detail: DERMATOLOGY
End: 2024-02-26

## 2024-02-26 DIAGNOSIS — L57.0 ACTINIC KERATOSIS: ICD-10-CM | Status: INADEQUATELY CONTROLLED

## 2024-02-26 DIAGNOSIS — L81.4 OTHER MELANIN HYPERPIGMENTATION: ICD-10-CM

## 2024-02-26 DIAGNOSIS — D22 MELANOCYTIC NEVI: ICD-10-CM

## 2024-02-26 DIAGNOSIS — L82.1 OTHER SEBORRHEIC KERATOSIS: ICD-10-CM

## 2024-02-26 PROBLEM — D22.5 MELANOCYTIC NEVI OF TRUNK: Status: ACTIVE | Noted: 2024-02-26

## 2024-02-26 PROBLEM — D48.5 NEOPLASM OF UNCERTAIN BEHAVIOR OF SKIN: Status: ACTIVE | Noted: 2024-02-26

## 2024-02-26 PROCEDURE — 17000 DESTRUCT PREMALG LESION: CPT | Mod: 59

## 2024-02-26 PROCEDURE — ? FULL BODY SKIN EXAM

## 2024-02-26 PROCEDURE — ? ADDITIONAL NOTES

## 2024-02-26 PROCEDURE — 17003 DESTRUCT PREMALG LES 2-14: CPT

## 2024-02-26 PROCEDURE — ? LIQUID NITROGEN

## 2024-02-26 PROCEDURE — 11102 TANGNTL BX SKIN SINGLE LES: CPT

## 2024-02-26 PROCEDURE — 11103 TANGNTL BX SKIN EA SEP/ADDL: CPT

## 2024-02-26 PROCEDURE — ? TREATMENT REGIMEN

## 2024-02-26 PROCEDURE — 99213 OFFICE O/P EST LOW 20 MIN: CPT | Mod: 25

## 2024-02-26 PROCEDURE — ? BIOPSY BY SHAVE METHOD

## 2024-02-26 PROCEDURE — ? COUNSELING

## 2024-02-26 ASSESSMENT — LOCATION DETAILED DESCRIPTION DERM
LOCATION DETAILED: LEFT INFERIOR MEDIAL UPPER BACK
LOCATION DETAILED: LEFT SUPERIOR UPPER BACK
LOCATION DETAILED: POSTERIOR MID-PARIETAL SCALP
LOCATION DETAILED: LEFT CENTRAL PARIETAL SCALP
LOCATION DETAILED: LEFT MEDIAL UPPER BACK
LOCATION DETAILED: RIGHT MEDIAL INFERIOR CHEST
LOCATION DETAILED: RIGHT CENTRAL FRONTAL SCALP
LOCATION DETAILED: STERNUM
LOCATION DETAILED: LEFT DISTAL DORSAL FOREARM
LOCATION DETAILED: RIGHT SUPERIOR PARIETAL SCALP
LOCATION DETAILED: LEFT SUPERIOR PARIETAL SCALP
LOCATION DETAILED: RIGHT DISTAL DORSAL FOREARM
LOCATION DETAILED: LEFT SUPERIOR FOREHEAD
LOCATION DETAILED: LEFT PROXIMAL DORSAL FOREARM

## 2024-02-26 ASSESSMENT — LOCATION ZONE DERM
LOCATION ZONE: SCALP
LOCATION ZONE: FACE
LOCATION ZONE: ARM
LOCATION ZONE: TRUNK

## 2024-02-26 ASSESSMENT — LOCATION SIMPLE DESCRIPTION DERM
LOCATION SIMPLE: RIGHT FOREARM
LOCATION SIMPLE: POSTERIOR SCALP
LOCATION SIMPLE: LEFT FOREARM
LOCATION SIMPLE: LEFT FOREHEAD
LOCATION SIMPLE: CHEST
LOCATION SIMPLE: SCALP
LOCATION SIMPLE: LEFT UPPER BACK
LOCATION SIMPLE: RIGHT SCALP

## 2024-02-26 NOTE — PROCEDURE: BIOPSY BY SHAVE METHOD
Detail Level: Detailed
Detail Level: Detailed
Depth Of Biopsy: dermis
Was A Bandage Applied: Yes
Size Of Lesion In Cm: 0
Biopsy Type: H and E
Biopsy Method: Dermablade
Anesthesia Type: 1% lidocaine with epinephrine
Anesthesia Volume In Cc: 0.5
Hemostasis: Drysol
Wound Care: Petrolatum
Dressing: bandage
Destruction After The Procedure: No
Type Of Destruction Used: Curettage
Curettage Text: The wound bed was treated with curettage after the biopsy was performed.
Cryotherapy Text: The wound bed was treated with cryotherapy after the biopsy was performed.
Electrodesiccation Text: The wound bed was treated with electrodesiccation after the biopsy was performed.
Electrodesiccation And Curettage Text: The wound bed was treated with electrodesiccation and curettage after the biopsy was performed.
Silver Nitrate Text: The wound bed was treated with silver nitrate after the biopsy was performed.
Lab: 6
Lab Facility: [id number]
Consent: verbal consent was obtained and risks were reviewed including but not limited to scarring, infection, bleeding, scabbing, incomplete removal, nerve damage and allergy to anesthesia.
Post-Care Instructions: I reviewed with the patient in detail post-care instructions. Patient is to keep the biopsy site dry overnight, and then apply bacitracin twice daily until healed. Patient may apply hydrogen peroxide soaks to remove any crusting.
Notification Instructions: Patient will be notified of biopsy results. However, patient instructed to call the office if not contacted within 2 weeks.
Billing Type: Third-Party Bill
Information: Selecting Yes will display possible errors in your note based on the variables you have selected. This validation is only offered as a suggestion for you. PLEASE NOTE THAT THE VALIDATION TEXT WILL BE REMOVED WHEN YOU FINALIZE YOUR NOTE. IF YOU WANT TO FAX A PRELIMINARY NOTE YOU WILL NEED TO TOGGLE THIS TO 'NO' IF YOU DO NOT WANT IT IN YOUR FAXED NOTE.

## 2024-03-06 ENCOUNTER — APPOINTMENT (RX ONLY)
Dept: URBAN - METROPOLITAN AREA CLINIC 150 | Facility: CLINIC | Age: 79
Setting detail: DERMATOLOGY
End: 2024-03-06

## 2024-03-06 PROBLEM — C43.61 MALIGNANT MELANOMA OF RIGHT UPPER LIMB, INCLUDING SHOULDER: Status: ACTIVE | Noted: 2024-03-06

## 2024-03-06 PROCEDURE — 11603 EXC TR-EXT MAL+MARG 2.1-3 CM: CPT | Mod: 79

## 2024-03-06 PROCEDURE — ? EXCISION

## 2024-03-06 PROCEDURE — ? PATHOLOGY DISCUSSION

## 2024-03-06 PROCEDURE — 13121 CMPLX RPR S/A/L 2.6-7.5 CM: CPT | Mod: 79

## 2024-03-06 PROCEDURE — ? COUNSELING

## 2024-03-06 PROCEDURE — ? MELANOMA STAGING

## 2024-03-06 NOTE — PROCEDURE: MELANOMA STAGING
Lymph Node Evaluation (Clinical And Microscopic): No clinically palpable lymph nodes and no microscopic lymph node metastases
Counseling For Stage Ib Melanomas: I reviewed the factors which determine melanoma stage. For Stage IB the 5-year survival rate is around 92%. The 10-year survival is around 86%. I recommended at least monthly skin self-examinations and close dermatology follow-up.
Mitotic Rate: Less than 1/mm2
Ulceration: No
Counseling For Stage Iiid Melanomas: I reviewed the factors which determine melanoma stage. For Stage IIID the 5-year survival rate is around 32%. The 10-year survival is around 24%. I recommended at least monthly skin self-examinations and close dermatology follow-up.
Are Regional Lymph Nodes Available For Evaluation: Yes
Counseling For Stage Iic Melanomas: I reviewed the factors which determine melanoma stage. For Stage IIC the 5-year survival rate is around 53%. The 10-year survival is around 40%. I recommended at least monthly skin self-examinations and close dermatology follow-up.
Counseling For Stage Iiib Melanomas: I reviewed the factors which determine melanoma stage. For Stage IIIB the 5-year survival rate is around 59%. The 10-year survival is around 43%. I recommended at least monthly skin self-examinations and close dermatology follow-up.
Counseling For Stage Iia Melanomas: I reviewed the factors which determine melanoma stage. For Stage IIA the 5-year survival rate is around 81%. The 10-year survival is around 67%. I recommended at least monthly skin self-examinations and close dermatology follow-up.
Counseling For Stage Ia Melanomas: I reviewed the factors which determine melanoma stage. For Stage IA the 5-year survival rate is around 97%. The 10-year survival is around 95%. I recommended at least monthly skin self-examinations and close dermatology follow-up.
Who Was Counseled?: patient
Staging Type: initial staging
Counseling For Stage Iiia Melanomas: I reviewed the factors which determine melanoma stage. For Stage IIIA the 5-year survival rate is around 78%. The 10-year survival is around 68%. I recommended at least monthly skin self-examinations and close dermatology follow-up.
Counseling For Stage Iib Melanomas: I reviewed the factors which determine melanoma stage. For Stage IIB the 5-year survival rate is around 70%. The 10-year survival is around 57%. I recommended at least monthly skin self-examinations and close dermatology follow-up.
Breslow Depth In Mm (Leave At 0 For In Situ): 0.4
Counseling For Stage 0 Melanomas: I reviewed the factors which determine melanoma stage. For Stage 0 the 5-year survival rate is 100%. I recommended at least monthly skin self-examinations and close dermatology follow-up.
Counseling For Stage Iv Melanomas: I reviewed the factors which determine melanoma stage. For Stage IV the 5-year survival rate is around 15% to 20%. The 10-year survival is about 10% to 15%. I recommended at least monthly skin self-examinations and close dermatology follow-up.
Distant Metastases: No distant metastases
Detail Level: Detailed
Counseling For Stage Iiic Melanomas: I reviewed the factors which determine melanoma stage. For Stage IIIC the 5-year survival rate is around 40%. The 10-year survival is around 24%. I recommended at least monthly skin self-examinations and close dermatology follow-up.

## 2024-03-06 NOTE — PROCEDURE: EXCISION
Referring Physician (Optional): LISSETH Shafer
Surgeon Performing The Repair (Optional): Warner Iglesias M.D.
Biopsy Photograph Reviewed: Yes
Accession #: [id number]
Date Of Previous Biopsy (Optional): 02-26-24
Size Of Lesion In Cm: 1.8
X Size Of Lesion In Cm (Optional): 0
Size Of Margin In Cm: 0.5
Was An Eye Clamp Used?: No
Eye Clamp Note Details: An eye clamp was used during the procedure.
Excision Method: Fusiform
Saucerization Depth: dermis and superficial adipose tissue
Repair Type: Complex
Intermediate / Complex Repair - Final Wound Length In Cm: 4.3
Suturegard Retention Suture: 2-0 Nylon
Retention Suture Bite Size: 3 mm
Length To Time In Minutes Device Was In Place: 10
Number Of Hemigard Strips Per Side: 1
Width Of Defect Perpendicular To Closure In Cm (Required): 0.8
Distance Of Undermining In Cm (Required): 1.5
Undermining Type: Entire Wound
Debridement Text: The wound edges were debrided prior to proceeding with the closure to facilitate wound healing.
Helical Rim Text: The closure involved the helical rim.
Vermilion Border Text: The closure involved the vermilion border.
Nostril Rim Text: The closure involved the nostril rim.
Retention Suture Text: Retention sutures were placed to support the closure and prevent dehiscence.
Suture Removal: 14 days
Lab: [id number]
Graft Donor Site Bandage (Optional-Leave Blank If You Don't Want In Note): Steri-strips and a pressure bandage were applied to the donor site.
Epidermal Closure Graft Donor Site (Optional): simple interrupted
Billing Type: Third-Party Bill
Excision Depth: adipose tissue
Scalpel Size: 15 blade
Anesthesia Type: 1% lidocaine with epinephrine
Additional Anesthesia Volume In Cc: 3
Hemostasis: Electrocautery
Estimated Blood Loss (Cc): minimal
Detail Level: Detailed
Deep Sutures: 4-0 Vicryl
Epidermal Sutures: 3-0 Ethilon
Wound Care: Petrolatum
Dressing: dry sterile dressing
Suturegard Intro: Intraoperative tissue expansion was performed, utilizing the SUTUREGARD device, in order to reduce wound tension.
Suturegard Body: The suture ends were repeatedly re-tightened and re-clamped to achieve the desired tissue expansion.
Hemigard Intro: Due to skin fragility and wound tension, it was decided to use HEMIGARD adhesive retention suture devices to permit a linear closure. The skin was cleaned and dried for a 6cm distance away from the wound. Excessive hair, if present, was removed to allow for adhesion.
Hemigard Postcare Instructions: The HEMIGARD strips are to remain completely dry for at least 5-7 days.
Positioning (Leave Blank If You Do Not Want): The patient was placed in a comfortable position exposing the surgical site.
Pre-Excision Curettage Text (Leave Blank If You Do Not Want): Prior to drawing the surgical margin the visible lesion was removed with electrodesiccation and curettage to clearly define the lesion size.
Complex Repair Preamble Text (Leave Blank If You Do Not Want): Extensive wide undermining was performed.
Intermediate Repair Preamble Text (Leave Blank If You Do Not Want): Undermining was performed with blunt dissection.
Curvilinear Excision Additional Text (Leave Blank If You Do Not Want): The margin was drawn around the clinically apparent lesion.  A curvilinear shape was then drawn on the skin incorporating the lesion and margins.  Incisions were then made along these lines to the appropriate tissue plane and the lesion was extirpated.
Fusiform Excision Additional Text (Leave Blank If You Do Not Want): The margin was drawn around the clinically apparent lesion.  A fusiform shape was then drawn on the skin incorporating the lesion and margins.  Incisions were then made along these lines to the appropriate tissue plane and the lesion was extirpated.
Elliptical Excision Additional Text (Leave Blank If You Do Not Want): The margin was drawn around the clinically apparent lesion.  An elliptical shape was then drawn on the skin incorporating the lesion and margins.  Incisions were then made along these lines to the appropriate tissue plane and the lesion was extirpated.
Saucerization Excision Additional Text (Leave Blank If You Do Not Want): The margin was drawn around the clinically apparent lesion.  Incisions were then made along these lines, in a tangential fashion, to the appropriate tissue plane and the lesion was extirpated.
Slit Excision Additional Text (Leave Blank If You Do Not Want): A linear line was drawn on the skin overlying the lesion. An incision was made slowly until the lesion was visualized.  Once visualized, the lesion was removed with blunt dissection.
Excisional Biopsy Additional Text (Leave Blank If You Do Not Want): The margin was drawn around the clinically apparent lesion. An elliptical shape was then drawn on the skin incorporating the lesion and margins.  Incisions were then made along these lines to the appropriate tissue plane and the lesion was extirpated.
Perilesional Excision Additional Text (Leave Blank If You Do Not Want): The margin was drawn around the clinically apparent lesion. Incisions were then made along these lines to the appropriate tissue plane and the lesion was extirpated.
Repair Performed By Another Provider Text (Leave Blank If You Do Not Want): After the tissue was excised the defect was repaired by another provider.
No Repair - Repaired With Adjacent Surgical Defect Text (Leave Blank If You Do Not Want): After the excision the defect was repaired concurrently with another surgical defect which was in close approximation.
Adjacent Tissue Transfer Text: The defect edges were debeveled with a #15 scalpel blade. Given the location of the defect and the proximity to free margins an adjacent tissue transfer was deemed most appropriate. Using a sterile surgical marker, an appropriate flap was drawn incorporating the defect and placing the expected incisions within the relaxed skin tension lines where possible. The area thus outlined was incised deep to adipose tissue with a #15 scalpel blade. The skin margins were undermined to an appropriate distance in all directions utilizing iris scissors and carried over to close the primary defect.
Advancement Flap (Single) Text: The defect edges were debeveled with a #15 scalpel blade.  Given the location of the defect and the proximity to free margins a single advancement flap was deemed most appropriate.  Using a sterile surgical marker, an appropriate advancement flap was drawn incorporating the defect and placing the expected incisions within the relaxed skin tension lines where possible.    The area thus outlined was incised deep to adipose tissue with a #15 scalpel blade.  The skin margins were undermined to an appropriate distance in all directions utilizing iris scissors.
Advancement Flap (Double) Text: The defect edges were debeveled with a #15 scalpel blade.  Given the location of the defect and the proximity to free margins a double advancement flap was deemed most appropriate.  Using a sterile surgical marker, the appropriate advancement flaps were drawn incorporating the defect and placing the expected incisions within the relaxed skin tension lines where possible.    The area thus outlined was incised deep to adipose tissue with a #15 scalpel blade.  The skin margins were undermined to an appropriate distance in all directions utilizing iris scissors.
Burow's Advancement Flap Text: The defect edges were debeveled with a #15 scalpel blade.  Given the location of the defect and the proximity to free margins a Burow's advancement flap was deemed most appropriate.  Using a sterile surgical marker, the appropriate advancement flap was drawn incorporating the defect and placing the expected incisions within the relaxed skin tension lines where possible.    The area thus outlined was incised deep to adipose tissue with a #15 scalpel blade.  The skin margins were undermined to an appropriate distance in all directions utilizing iris scissors.
Chonodrocutaneous Helical Advancement Flap Text: The defect edges were debeveled with a #15 scalpel blade. Given the location of the defect and the proximity to free margins a chondrocutaneous helical advancement flap was deemed most appropriate. Using a sterile surgical marker, the appropriate advancement flap was drawn incorporating the defect and placing the expected incisions within the relaxed skin tension lines where possible. The area thus outlined was incised deep to adipose tissue with a #15 scalpel blade. The skin margins were undermined to an appropriate distance in all directions utilizing iris scissors. Following this, the designed flap was advanced and carried over into the primary defect and sutured into place.
Crescentic Advancement Flap Text: The defect edges were debeveled with a #15 scalpel blade.  Given the location of the defect and the proximity to free margins a crescentic advancement flap was deemed most appropriate.  Using a sterile surgical marker, the appropriate advancement flap was drawn incorporating the defect and placing the expected incisions within the relaxed skin tension lines where possible.    The area thus outlined was incised deep to adipose tissue with a #15 scalpel blade.  The skin margins were undermined to an appropriate distance in all directions utilizing iris scissors.
A-T Advancement Flap Text: The defect edges were debeveled with a #15 scalpel blade.  Given the location of the defect, shape of the defect and the proximity to free margins an A-T advancement flap was deemed most appropriate.  Using a sterile surgical marker, an appropriate advancement flap was drawn incorporating the defect and placing the expected incisions within the relaxed skin tension lines where possible.    The area thus outlined was incised deep to adipose tissue with a #15 scalpel blade.  The skin margins were undermined to an appropriate distance in all directions utilizing iris scissors.
O-T Advancement Flap Text: The defect edges were debeveled with a #15 scalpel blade.  Given the location of the defect, shape of the defect and the proximity to free margins an O-T advancement flap was deemed most appropriate.  Using a sterile surgical marker, an appropriate advancement flap was drawn incorporating the defect and placing the expected incisions within the relaxed skin tension lines where possible.    The area thus outlined was incised deep to adipose tissue with a #15 scalpel blade.  The skin margins were undermined to an appropriate distance in all directions utilizing iris scissors.
O-L Flap Text: The defect edges were debeveled with a #15 scalpel blade.  Given the location of the defect, shape of the defect and the proximity to free margins an O-L flap was deemed most appropriate.  Using a sterile surgical marker, an appropriate advancement flap was drawn incorporating the defect and placing the expected incisions within the relaxed skin tension lines where possible.    The area thus outlined was incised deep to adipose tissue with a #15 scalpel blade.  The skin margins were undermined to an appropriate distance in all directions utilizing iris scissors.
O-Z Flap Text: The defect edges were debeveled with a #15 scalpel blade. Given the location of the defect, shape of the defect and the proximity to free margins an O-Z flap was deemed most appropriate. Using a sterile surgical marker, an appropriate transposition flap was drawn incorporating the defect and placing the expected incisions within the relaxed skin tension lines where possible. The area thus outlined was incised deep to adipose tissue with a #15 scalpel blade. The skin margins were undermined to an appropriate distance in all directions utilizing iris scissors. Following this, the designed flap was carried over into the primary defect and sutured into place.
Double O-Z Flap Text: The defect edges were debeveled with a #15 scalpel blade. Given the location of the defect, shape of the defect and the proximity to free margins a Double O-Z flap was deemed most appropriate. Using a sterile surgical marker, an appropriate transposition flap was drawn incorporating the defect and placing the expected incisions within the relaxed skin tension lines where possible. The area thus outlined was incised deep to adipose tissue with a #15 scalpel blade. The skin margins were undermined to an appropriate distance in all directions utilizing iris scissors. Following this, the designed flap was carried over into the primary defect and sutured into place.
V-Y Flap Text: The defect edges were debeveled with a #15 scalpel blade.  Given the location of the defect, shape of the defect and the proximity to free margins a V-Y flap was deemed most appropriate.  Using a sterile surgical marker, an appropriate advancement flap was drawn incorporating the defect and placing the expected incisions within the relaxed skin tension lines where possible.    The area thus outlined was incised deep to adipose tissue with a #15 scalpel blade.  The skin margins were undermined to an appropriate distance in all directions utilizing iris scissors.
Advancement-Rotation Flap Text: The defect edges were debeveled with a #15 scalpel blade.  Given the location of the defect, shape of the defect and the proximity to free margins an advancement-rotation flap was deemed most appropriate.  Using a sterile surgical marker, an appropriate flap was drawn incorporating the defect and placing the expected incisions within the relaxed skin tension lines where possible. The area thus outlined was incised deep to adipose tissue with a #15 scalpel blade.  The skin margins were undermined to an appropriate distance in all directions utilizing iris scissors.
Mercedes Flap Text: The defect edges were debeveled with a #15 scalpel blade.  Given the location of the defect, shape of the defect and the proximity to free margins a Mercedes flap was deemed most appropriate.  Using a sterile surgical marker, an appropriate advancement flap was drawn incorporating the defect and placing the expected incisions within the relaxed skin tension lines where possible. The area thus outlined was incised deep to adipose tissue with a #15 scalpel blade.  The skin margins were undermined to an appropriate distance in all directions utilizing iris scissors.
Modified Advancement Flap Text: The defect edges were debeveled with a #15 scalpel blade.  Given the location of the defect, shape of the defect and the proximity to free margins a modified advancement flap was deemed most appropriate.  Using a sterile surgical marker, an appropriate advancement flap was drawn incorporating the defect and placing the expected incisions within the relaxed skin tension lines where possible.    The area thus outlined was incised deep to adipose tissue with a #15 scalpel blade.  The skin margins were undermined to an appropriate distance in all directions utilizing iris scissors.
Mucosal Advancement Flap Text: Given the location of the defect, shape of the defect and the proximity to free margins a mucosal advancement flap was deemed most appropriate. Incisions were made with a 15 blade scalpel in the appropriate fashion along the cutaneous vermilion border and the mucosal lip. The remaining actinically damaged mucosal tissue was excised.  The mucosal advancement flap was then elevated to the gingival sulcus with care taken to preserve the neurovascular structures and advanced into the primary defect. Care was taken to ensure that precise realignment of the vermilion border was achieved.
Peng Advancement Flap Text: The defect edges were debeveled with a #15 scalpel blade. Given the location of the defect, shape of the defect and the proximity to free margins a Peng advancement flap was deemed most appropriate. Using a sterile surgical marker, an appropriate advancement flap was drawn incorporating the defect and placing the expected incisions within the relaxed skin tension lines where possible. The area thus outlined was incised deep to adipose tissue with a #15 scalpel blade. The skin margins were undermined to an appropriate distance in all directions utilizing iris scissors. Following this, the designed flap was advanced and carried over into the primary defect and sutured into place.
Hatchet Flap Text: The defect edges were debeveled with a #15 scalpel blade.  Given the location of the defect, shape of the defect and the proximity to free margins a hatchet flap was deemed most appropriate.  Using a sterile surgical marker, an appropriate hatchet flap was drawn incorporating the defect and placing the expected incisions within the relaxed skin tension lines where possible.    The area thus outlined was incised deep to adipose tissue with a #15 scalpel blade.  The skin margins were undermined to an appropriate distance in all directions utilizing iris scissors.
Rotation Flap Text: The defect edges were debeveled with a #15 scalpel blade.  Given the location of the defect, shape of the defect and the proximity to free margins a rotation flap was deemed most appropriate.  Using a sterile surgical marker, an appropriate rotation flap was drawn incorporating the defect and placing the expected incisions within the relaxed skin tension lines where possible.    The area thus outlined was incised deep to adipose tissue with a #15 scalpel blade.  The skin margins were undermined to an appropriate distance in all directions utilizing iris scissors.
Bilateral Rotation Flap Text: The defect edges were debeveled with a #15 scalpel blade. Given the location of the defect, shape of the defect and the proximity to free margins a bilateral rotation flap was deemed most appropriate. Using a sterile surgical marker, an appropriate rotation flap was drawn incorporating the defect and placing the expected incisions within the relaxed skin tension lines where possible. The area thus outlined was incised deep to adipose tissue with a #15 scalpel blade. The skin margins were undermined to an appropriate distance in all directions utilizing iris scissors. Following this, the designed flap was carried over into the primary defect and sutured into place.
Spiral Flap Text: The defect edges were debeveled with a #15 scalpel blade.  Given the location of the defect, shape of the defect and the proximity to free margins a spiral flap was deemed most appropriate.  Using a sterile surgical marker, an appropriate rotation flap was drawn incorporating the defect and placing the expected incisions within the relaxed skin tension lines where possible. The area thus outlined was incised deep to adipose tissue with a #15 scalpel blade.  The skin margins were undermined to an appropriate distance in all directions utilizing iris scissors.
Staged Advancement Flap Text: The defect edges were debeveled with a #15 scalpel blade. Given the location of the defect, shape of the defect and the proximity to free margins a staged advancement flap was deemed most appropriate. Using a sterile surgical marker, an appropriate advancement flap was drawn incorporating the defect and placing the expected incisions within the relaxed skin tension lines where possible. The area thus outlined was incised deep to adipose tissue with a #15 scalpel blade. The skin margins were undermined to an appropriate distance in all directions utilizing iris scissors. Following this, the designed flap was carried over into the primary defect and sutured into place.
Star Wedge Flap Text: The defect edges were debeveled with a #15 scalpel blade.  Given the location of the defect, shape of the defect and the proximity to free margins a star wedge flap was deemed most appropriate.  Using a sterile surgical marker, an appropriate rotation flap was drawn incorporating the defect and placing the expected incisions within the relaxed skin tension lines where possible. The area thus outlined was incised deep to adipose tissue with a #15 scalpel blade.  The skin margins were undermined to an appropriate distance in all directions utilizing iris scissors.
Transposition Flap Text: The defect edges were debeveled with a #15 scalpel blade.  Given the location of the defect and the proximity to free margins a transposition flap was deemed most appropriate.  Using a sterile surgical marker, an appropriate transposition flap was drawn incorporating the defect.    The area thus outlined was incised deep to adipose tissue with a #15 scalpel blade.  The skin margins were undermined to an appropriate distance in all directions utilizing iris scissors.
Muscle Hinge Flap Text: The defect edges were debeveled with a #15 scalpel blade.  Given the size, depth and location of the defect and the proximity to free margins a muscle hinge flap was deemed most appropriate.  Using a sterile surgical marker, an appropriate hinge flap was drawn incorporating the defect. The area thus outlined was incised with a #15 scalpel blade.  The skin margins were undermined to an appropriate distance in all directions utilizing iris scissors.
Mustarde Flap Text: The defect edges were debeveled with a #15 scalpel blade.  Given the size, depth and location of the defect and the proximity to free margins a Mustarde flap was deemed most appropriate. Using a sterile surgical marker, an appropriate flap was drawn incorporating the defect. The area thus outlined was incised with a #15 scalpel blade. The skin margins were undermined to an appropriate distance in all directions utilizing iris scissors. Following this, the designed flap was carried into the primary defect and sutured into place.
Nasal Turnover Hinge Flap Text: The defect edges were debeveled with a #15 scalpel blade.  Given the size, depth, location of the defect and the defect being full thickness a nasal turnover hinge flap was deemed most appropriate. Using a sterile surgical marker, an appropriate hinge flap was drawn incorporating the defect. The area thus outlined was incised with a #15 scalpel blade. The flap was designed to recreate the nasal mucosal lining and the alar rim. The skin margins were undermined to an appropriate distance in all directions utilizing iris scissors. Following this, the designed flap was carried over into the primary defect and sutured into place
Nasalis-Muscle-Based Myocutaneous Island Pedicle Flap Text: Using a #15 blade, an incision was made around the donor flap to the level of the nasalis muscle. Wide lateral undermining was then performed in both the subcutaneous plane above the nasalis muscle, and in a submuscular plane just above periosteum. This allowed the formation of a free nasalis muscle axial pedicle (based on the angular artery) which was still attached to the actual cutaneous flap, increasing its mobility and vascular viability. Hemostasis was obtained with pinpoint electrocoagulation. The flap was mobilized into position and the pivotal anchor points positioned and stabilized with buried interrupted sutures. Subcutaneous and dermal tissues were closed in a multilayered fashion with sutures. Tissue redundancies were excised, and the epidermal edges were apposed without significant tension and sutured with sutures.
Nasalis Myocutaneous Flap Text: Using a #15 blade, an incision was made around the donor flap to the level of the nasalis muscle. Wide lateral undermining was then performed in both the subcutaneous plane above the nasalis muscle, and in a submuscular plane just above periosteum. This allowed the formation of a free nasalis muscle axial pedicle which was still attached to the actual cutaneous flap, increasing its mobility and vascular viability. Hemostasis was obtained with pinpoint electrocoagulation. The flap was mobilized into position and the pivotal anchor points positioned and stabilized with buried interrupted sutures. Subcutaneous and dermal tissues were closed in a multilayered fashion with sutures. Tissue redundancies were excised, and the epidermal edges were apposed without significant tension and sutured with sutures.
Orbicularis Oris Muscle Flap Text: The defect edges were debeveled with a #15 scalpel blade.  Given that the defect affected the competency of the oral sphincter an orbicularis oris muscle flap was deemed most appropriate to restore this competency and normal muscle function.  Using a sterile surgical marker, an appropriate flap was drawn incorporating the defect. The area thus outlined was incised with a #15 scalpel blade. Following this, the designed flap was carried over into the primary defect and sutured into place.
Melolabial Transposition Flap Text: The defect edges were debeveled with a #15 scalpel blade.  Given the location of the defect and the proximity to free margins a melolabial flap was deemed most appropriate.  Using a sterile surgical marker, an appropriate melolabial transposition flap was drawn incorporating the defect.    The area thus outlined was incised deep to adipose tissue with a #15 scalpel blade.  The skin margins were undermined to an appropriate distance in all directions utilizing iris scissors.
Rectangular Flap Text: The defect edges were debeveled with a #15 scalpel blade. Given the location of the defect and the proximity to free margins a rectangular flap was deemed most appropriate. Using a sterile surgical marker, an appropriate rectangular flap was drawn incorporating the defect. The area thus outlined was incised deep to adipose tissue with a #15 scalpel blade. The skin margins were undermined to an appropriate distance in all directions utilizing iris scissors. Following this, the designed flap was carried over into the primary defect and sutured into place.
Rhombic Flap Text: The defect edges were debeveled with a #15 scalpel blade.  Given the location of the defect and the proximity to free margins a rhombic flap was deemed most appropriate.  Using a sterile surgical marker, an appropriate rhombic flap was drawn incorporating the defect.    The area thus outlined was incised deep to adipose tissue with a #15 scalpel blade.  The skin margins were undermined to an appropriate distance in all directions utilizing iris scissors.
Rhomboid Transposition Flap Text: The defect edges were debeveled with a #15 scalpel blade. Given the location of the defect and the proximity to free margins a rhomboid transposition flap was deemed most appropriate. Using a sterile surgical marker, an appropriate rhomboid flap was drawn incorporating the defect. The area thus outlined was incised deep to adipose tissue with a #15 scalpel blade. The skin margins were undermined to an appropriate distance in all directions utilizing iris scissors. Following this, the designed flap was carried over into the primary defect and sutured into place.
Bi-Rhombic Flap Text: The defect edges were debeveled with a #15 scalpel blade.  Given the location of the defect and the proximity to free margins a bi-rhombic flap was deemed most appropriate.  Using a sterile surgical marker, an appropriate rhombic flap was drawn incorporating the defect. The area thus outlined was incised deep to adipose tissue with a #15 scalpel blade.  The skin margins were undermined to an appropriate distance in all directions utilizing iris scissors.
Helical Rim Advancement Flap Text: The defect edges were debeveled with a #15 blade scalpel.  Given the location of the defect and the proximity to free margins (helical rim) a double helical rim advancement flap was deemed most appropriate.  Using a sterile surgical marker, the appropriate advancement flaps were drawn incorporating the defect and placing the expected incisions between the helical rim and antihelix where possible.  The area thus outlined was incised through and through with a #15 scalpel blade.  With a skin hook and iris scissors, the flaps were gently and sharply undermined and freed up.
Bilateral Helical Rim Advancement Flap Text: The defect edges were debeveled with a #15 blade scalpel.  Given the location of the defect and the proximity to free margins (helical rim) a bilateral helical rim advancement flap was deemed most appropriate.  Using a sterile surgical marker, the appropriate advancement flaps were drawn incorporating the defect and placing the expected incisions between the helical rim and antihelix where possible.  The area thus outlined was incised through and through with a #15 scalpel blade.  With a skin hook and iris scissors, the flaps were gently and sharply undermined and freed up.
Ear Star Wedge Flap Text: The defect edges were debeveled with a #15 blade scalpel.  Given the location of the defect and the proximity to free margins (helical rim) an ear star wedge flap was deemed most appropriate.  Using a sterile surgical marker, the appropriate flap was drawn incorporating the defect and placing the expected incisions between the helical rim and antihelix where possible.  The area thus outlined was incised through and through with a #15 scalpel blade.
Banner Transposition Flap Text: The defect edges were debeveled with a #15 scalpel blade.  Given the location of the defect and the proximity to free margins a Banner transposition flap was deemed most appropriate.  Using a sterile surgical marker, an appropriate flap drawn around the defect. The area thus outlined was incised deep to adipose tissue with a #15 scalpel blade.  The skin margins were undermined to an appropriate distance in all directions utilizing iris scissors.
Bilobed Flap Text: The defect edges were debeveled with a #15 scalpel blade.  Given the location of the defect and the proximity to free margins a bilobe flap was deemed most appropriate.  Using a sterile surgical marker, an appropriate bilobe flap drawn around the defect.    The area thus outlined was incised deep to adipose tissue with a #15 scalpel blade.  The skin margins were undermined to an appropriate distance in all directions utilizing iris scissors.
Bilobed Transposition Flap Text: The defect edges were debeveled with a #15 scalpel blade.  Given the location of the defect and the proximity to free margins a bilobed transposition flap was deemed most appropriate.  Using a sterile surgical marker, an appropriate bilobe flap drawn around the defect.    The area thus outlined was incised deep to adipose tissue with a #15 scalpel blade.  The skin margins were undermined to an appropriate distance in all directions utilizing iris scissors.
Trilobed Flap Text: The defect edges were debeveled with a #15 scalpel blade.  Given the location of the defect and the proximity to free margins a trilobed flap was deemed most appropriate.  Using a sterile surgical marker, an appropriate trilobed flap drawn around the defect.    The area thus outlined was incised deep to adipose tissue with a #15 scalpel blade.  The skin margins were undermined to an appropriate distance in all directions utilizing iris scissors.
Dorsal Nasal Flap Text: The defect edges were debeveled with a #15 scalpel blade.  Given the location of the defect and the proximity to free margins a dorsal nasal flap was deemed most appropriate.  Using a sterile surgical marker, an appropriate dorsal nasal flap was drawn around the defect.    The area thus outlined was incised deep to adipose tissue with a #15 scalpel blade.  The skin margins were undermined to an appropriate distance in all directions utilizing iris scissors.
Island Pedicle Flap Text: The defect edges were debeveled with a #15 scalpel blade.  Given the location of the defect, shape of the defect and the proximity to free margins an island pedicle advancement flap was deemed most appropriate.  Using a sterile surgical marker, an appropriate advancement flap was drawn incorporating the defect, outlining the appropriate donor tissue and placing the expected incisions within the relaxed skin tension lines where possible.    The area thus outlined was incised deep to adipose tissue with a #15 scalpel blade.  The skin margins were undermined to an appropriate distance in all directions around the primary defect and laterally outward around the island pedicle utilizing iris scissors.  There was minimal undermining beneath the pedicle flap.
Island Pedicle Flap With Canthal Suspension Text: The defect edges were debeveled with a #15 scalpel blade.  Given the location of the defect, shape of the defect and the proximity to free margins an island pedicle advancement flap was deemed most appropriate.  Using a sterile surgical marker, an appropriate advancement flap was drawn incorporating the defect, outlining the appropriate donor tissue and placing the expected incisions within the relaxed skin tension lines where possible. The area thus outlined was incised deep to adipose tissue with a #15 scalpel blade.  The skin margins were undermined to an appropriate distance in all directions around the primary defect and laterally outward around the island pedicle utilizing iris scissors.  There was minimal undermining beneath the pedicle flap. A suspension suture was placed in the canthal tendon to prevent tension and prevent ectropion.
Alar Island Pedicle Flap Text: The defect edges were debeveled with a #15 scalpel blade.  Given the location of the defect, shape of the defect and the proximity to the alar rim an island pedicle advancement flap was deemed most appropriate.  Using a sterile surgical marker, an appropriate advancement flap was drawn incorporating the defect, outlining the appropriate donor tissue and placing the expected incisions within the nasal ala running parallel to the alar rim. The area thus outlined was incised with a #15 scalpel blade.  The skin margins were undermined minimally to an appropriate distance in all directions around the primary defect and laterally outward around the island pedicle utilizing iris scissors.  There was minimal undermining beneath the pedicle flap.
Double Island Pedicle Flap Text: The defect edges were debeveled with a #15 scalpel blade.  Given the location of the defect, shape of the defect and the proximity to free margins a double island pedicle advancement flap was deemed most appropriate.  Using a sterile surgical marker, an appropriate advancement flap was drawn incorporating the defect, outlining the appropriate donor tissue and placing the expected incisions within the relaxed skin tension lines where possible.    The area thus outlined was incised deep to adipose tissue with a #15 scalpel blade.  The skin margins were undermined to an appropriate distance in all directions around the primary defect and laterally outward around the island pedicle utilizing iris scissors.  There was minimal undermining beneath the pedicle flap.
Island Pedicle Flap-Requiring Vessel Identification Text: The defect edges were debeveled with a #15 scalpel blade.  Given the location of the defect, shape of the defect and the proximity to free margins an island pedicle advancement flap was deemed most appropriate.  Using a sterile surgical marker, an appropriate advancement flap was drawn, based on the axial vessel mentioned above, incorporating the defect, outlining the appropriate donor tissue and placing the expected incisions within the relaxed skin tension lines where possible.    The area thus outlined was incised deep to adipose tissue with a #15 scalpel blade.  The skin margins were undermined to an appropriate distance in all directions around the primary defect and laterally outward around the island pedicle utilizing iris scissors.  There was minimal undermining beneath the pedicle flap.
Keystone Flap Text: The defect edges were debeveled with a #15 scalpel blade.  Given the location of the defect, shape of the defect a keystone flap was deemed most appropriate.  Using a sterile surgical marker, an appropriate keystone flap was drawn incorporating the defect, outlining the appropriate donor tissue and placing the expected incisions within the relaxed skin tension lines where possible. The area thus outlined was incised deep to adipose tissue with a #15 scalpel blade.  The skin margins were undermined to an appropriate distance in all directions around the primary defect and laterally outward around the flap utilizing iris scissors.
O-T Plasty Text: The defect edges were debeveled with a #15 scalpel blade.  Given the location of the defect, shape of the defect and the proximity to free margins an O-T plasty was deemed most appropriate.  Using a sterile surgical marker, an appropriate O-T plasty was drawn incorporating the defect and placing the expected incisions within the relaxed skin tension lines where possible.    The area thus outlined was incised deep to adipose tissue with a #15 scalpel blade.  The skin margins were undermined to an appropriate distance in all directions utilizing iris scissors.
O-Z Plasty Text: The defect edges were debeveled with a #15 scalpel blade.  Given the location of the defect, shape of the defect and the proximity to free margins an O-Z plasty (double transposition flap) was deemed most appropriate.  Using a sterile surgical marker, the appropriate transposition flaps were drawn incorporating the defect and placing the expected incisions within the relaxed skin tension lines where possible.    The area thus outlined was incised deep to adipose tissue with a #15 scalpel blade.  The skin margins were undermined to an appropriate distance in all directions utilizing iris scissors.  Hemostasis was achieved with electrocautery.  The flaps were then transposed into place, one clockwise and the other counterclockwise, and anchored with interrupted buried subcutaneous sutures.
Double O-Z Plasty Text: The defect edges were debeveled with a #15 scalpel blade. Given the location of the defect, shape of the defect and the proximity to free margins a Double O-Z plasty (double transposition flap) was deemed most appropriate. Using a sterile surgical marker, the appropriate transposition flaps were drawn incorporating the defect and placing the expected incisions within the relaxed skin tension lines where possible. The area thus outlined was incised deep to adipose tissue with a #15 scalpel blade. The skin margins were undermined to an appropriate distance in all directions utilizing iris scissors. Hemostasis was achieved with electrocautery. The flaps were then transposed and carried over into place, one clockwise and the other counterclockwise, and anchored with interrupted buried subcutaneous sutures.
V-Y Plasty Text: The defect edges were debeveled with a #15 scalpel blade.  Given the location of the defect, shape of the defect and the proximity to free margins an V-Y advancement flap was deemed most appropriate.  Using a sterile surgical marker, an appropriate advancement flap was drawn incorporating the defect and placing the expected incisions within the relaxed skin tension lines where possible.    The area thus outlined was incised deep to adipose tissue with a #15 scalpel blade.  The skin margins were undermined to an appropriate distance in all directions utilizing iris scissors.
H Plasty Text: Given the location of the defect, shape of the defect and the proximity to free margins a H-plasty was deemed most appropriate for repair.  Using a sterile surgical marker, the appropriate advancement arms of the H-plasty were drawn incorporating the defect and placing the expected incisions within the relaxed skin tension lines where possible. The area thus outlined was incised deep to adipose tissue with a #15 scalpel blade. The skin margins were undermined to an appropriate distance in all directions utilizing iris scissors.  The opposing advancement arms were then advanced into place in opposite direction and anchored with interrupted buried subcutaneous sutures.
W Plasty Text: The lesion was extirpated to the level of the fat with a #15 scalpel blade.  Given the location of the defect, shape of the defect and the proximity to free margins a W-plasty was deemed most appropriate for repair.  Using a sterile surgical marker, the appropriate transposition arms of the W-plasty were drawn incorporating the defect and placing the expected incisions within the relaxed skin tension lines where possible.    The area thus outlined was incised deep to adipose tissue with a #15 scalpel blade.  The skin margins were undermined to an appropriate distance in all directions utilizing iris scissors.  The opposing transposition arms were then transposed into place in opposite direction and anchored with interrupted buried subcutaneous sutures.
Z Plasty Text: The lesion was extirpated to the level of the fat with a #15 scalpel blade.  Given the location of the defect, shape of the defect and the proximity to free margins a Z-plasty was deemed most appropriate for repair.  Using a sterile surgical marker, the appropriate transposition arms of the Z-plasty were drawn incorporating the defect and placing the expected incisions within the relaxed skin tension lines where possible.    The area thus outlined was incised deep to adipose tissue with a #15 scalpel blade.  The skin margins were undermined to an appropriate distance in all directions utilizing iris scissors.  The opposing transposition arms were then transposed into place in opposite direction and anchored with interrupted buried subcutaneous sutures.
Double Z Plasty Text: The lesion was extirpated to the level of the fat with a #15 scalpel blade. Given the location of the defect, shape of the defect and the proximity to free margins a double Z-plasty was deemed most appropriate for repair. Using a sterile surgical marker, the appropriate transposition arms of the double Z-plasty were drawn incorporating the defect and placing the expected incisions within the relaxed skin tension lines where possible. The area thus outlined was incised deep to adipose tissue with a #15 scalpel blade. The skin margins were undermined to an appropriate distance in all directions utilizing iris scissors. The opposing transposition arms were then transposed and carried over into place in opposite direction and anchored with interrupted buried subcutaneous sutures.
Zygomaticofacial Flap Text: Given the location of the defect, shape of the defect and the proximity to free margins a zygomaticofacial flap was deemed most appropriate for repair. Using a sterile surgical marker, the appropriate flap was drawn incorporating the defect and placing the expected incisions within the relaxed skin tension lines where possible. The area thus outlined was incised deep to adipose tissue with a #15 scalpel blade with preservation of a vascular pedicle.  The skin margins were undermined to an appropriate distance in all directions utilizing iris scissors. The flap was then carried over into the defect and anchored with interrupted buried subcutaneous sutures.
Cheek Interpolation Flap Text: A decision was made to reconstruct the defect utilizing an interpolation axial flap and a staged reconstruction.  A telfa template was made of the defect.  This telfa template was then used to outline the Cheek Interpolation flap.  The donor area for the pedicle flap was then injected with anesthesia.  The flap was excised through the skin and subcutaneous tissue down to the layer of the underlying musculature.  The interpolation flap was carefully excised within this deep plane to maintain its blood supply.  The edges of the donor site were undermined.   The donor site was closed in a primary fashion.  The pedicle was then rotated into position and sutured.  Once the tube was sutured into place, adequate blood supply was confirmed with blanching and refill.  The pedicle was then wrapped with xeroform gauze and dressed appropriately with a telfa and gauze bandage to ensure continued blood supply and protect the attached pedicle.
Cheek-To-Nose Interpolation Flap Text: A decision was made to reconstruct the defect utilizing an interpolation axial flap and a staged reconstruction.  A telfa template was made of the defect.  This telfa template was then used to outline the Cheek-To-Nose Interpolation flap.  The donor area for the pedicle flap was then injected with anesthesia.  The flap was excised through the skin and subcutaneous tissue down to the layer of the underlying musculature.  The interpolation flap was carefully excised within this deep plane to maintain its blood supply.  The edges of the donor site were undermined.   The donor site was closed in a primary fashion.  The pedicle was then rotated into position and sutured.  Once the tube was sutured into place, adequate blood supply was confirmed with blanching and refill.  The pedicle was then wrapped with xeroform gauze and dressed appropriately with a telfa and gauze bandage to ensure continued blood supply and protect the attached pedicle.
Interpolation Flap Text: A decision was made to reconstruct the defect utilizing an interpolation axial flap and a staged reconstruction.  A telfa template was made of the defect.  This telfa template was then used to outline the interpolation flap.  The donor area for the pedicle flap was then injected with anesthesia.  The flap was excised through the skin and subcutaneous tissue down to the layer of the underlying musculature.  The interpolation flap was carefully excised within this deep plane to maintain its blood supply.  The edges of the donor site were undermined.   The donor site was closed in a primary fashion.  The pedicle was then rotated into position and sutured.  Once the tube was sutured into place, adequate blood supply was confirmed with blanching and refill.  The pedicle was then wrapped with xeroform gauze and dressed appropriately with a telfa and gauze bandage to ensure continued blood supply and protect the attached pedicle.
Melolabial Interpolation Flap Text: A decision was made to reconstruct the defect utilizing an interpolation axial flap and a staged reconstruction.  A telfa template was made of the defect.  This telfa template was then used to outline the melolabial interpolation flap.  The donor area for the pedicle flap was then injected with anesthesia.  The flap was excised through the skin and subcutaneous tissue down to the layer of the underlying musculature.  The pedicle flap was carefully excised within this deep plane to maintain its blood supply.  The edges of the donor site were undermined.   The donor site was closed in a primary fashion.  The pedicle was then rotated into position and sutured.  Once the tube was sutured into place, adequate blood supply was confirmed with blanching and refill.  The pedicle was then wrapped with xeroform gauze and dressed appropriately with a telfa and gauze bandage to ensure continued blood supply and protect the attached pedicle.
Mastoid Interpolation Flap Text: A decision was made to reconstruct the defect utilizing an interpolation axial flap and a staged reconstruction.  A telfa template was made of the defect.  This telfa template was then used to outline the mastoid interpolation flap.  The donor area for the pedicle flap was then injected with anesthesia.  The flap was excised through the skin and subcutaneous tissue down to the layer of the underlying musculature.  The pedicle flap was carefully excised within this deep plane to maintain its blood supply.  The edges of the donor site were undermined.   The donor site was closed in a primary fashion.  The pedicle was then rotated into position and sutured.  Once the tube was sutured into place, adequate blood supply was confirmed with blanching and refill.  The pedicle was then wrapped with xeroform gauze and dressed appropriately with a telfa and gauze bandage to ensure continued blood supply and protect the attached pedicle.
Posterior Auricular Interpolation Flap Text: A decision was made to reconstruct the defect utilizing an interpolation axial flap and a staged reconstruction.  A telfa template was made of the defect.  This telfa template was then used to outline the posterior auricular interpolation flap.  The donor area for the pedicle flap was then injected with anesthesia.  The flap was excised through the skin and subcutaneous tissue down to the layer of the underlying musculature.  The pedicle flap was carefully excised within this deep plane to maintain its blood supply.  The edges of the donor site were undermined.   The donor site was closed in a primary fashion.  The pedicle was then rotated into position and sutured.  Once the tube was sutured into place, adequate blood supply was confirmed with blanching and refill.  The pedicle was then wrapped with xeroform gauze and dressed appropriately with a telfa and gauze bandage to ensure continued blood supply and protect the attached pedicle.
Paramedian Forehead Flap Text: A decision was made to reconstruct the defect utilizing an interpolation axial flap and a staged reconstruction.  A telfa template was made of the defect.  This telfa template was then used to outline the paramedian forehead pedicle flap.  The donor area for the pedicle flap was then injected with anesthesia.  The flap was excised through the skin and subcutaneous tissue down to the layer of the underlying musculature.  The pedicle flap was carefully excised within this deep plane to maintain its blood supply.  The edges of the donor site were undermined.   The donor site was closed in a primary fashion.  The pedicle was then rotated into position and sutured.  Once the tube was sutured into place, adequate blood supply was confirmed with blanching and refill.  The pedicle was then wrapped with xeroform gauze and dressed appropriately with a telfa and gauze bandage to ensure continued blood supply and protect the attached pedicle.
Abbe Flap (Upper To Lower Lip) Text: The defect of the lower lip was assessed and measured.  Given the location and size of the defect, an Abbe flap was deemed most appropriate. Using a sterile surgical marker, an appropriate Abbe flap was measured and drawn on the upper lip. Local anesthesia was then infiltrated.  A scalpel was then used to incise the upper lip through and through the skin, vermilion, muscle and mucosa, leaving the flap pedicled on the opposite side.  The flap was then rotated and transferred to the lower lip defect.  The flap was then sutured into place with a three layer technique, closing the orbicularis oris muscle layer with subcutaneous buried sutures, followed by a mucosal layer and an epidermal layer.
Abbe Flap (Lower To Upper Lip) Text: The defect of the upper lip was assessed and measured.  Given the location and size of the defect, an Abbe flap was deemed most appropriate. Using a sterile surgical marker, an appropriate Abbe flap was measured and drawn on the lower lip. Local anesthesia was then infiltrated. A scalpel was then used to incise the upper lip through and through the skin, vermilion, muscle and mucosa, leaving the flap pedicled on the opposite side.  The flap was then rotated and transferred to the lower lip defect.  The flap was then sutured into place with a three layer technique, closing the orbicularis oris muscle layer with subcutaneous buried sutures, followed by a mucosal layer and an epidermal layer.
Estlander Flap (Upper To Lower Lip) Text: The defect of the lower lip was assessed and measured.  Given the location and size of the defect, an Estlander flap was deemed most appropriate. Using a sterile surgical marker, an appropriate Estlander flap was measured and drawn on the upper lip. Local anesthesia was then infiltrated. A scalpel was then used to incise the lateral aspect of the flap, through skin, muscle and mucosa, leaving the flap pedicled medially.  The flap was then rotated and positioned to fill the lower lip defect.  The flap was then sutured into place with a three layer technique, closing the orbicularis oris muscle layer with subcutaneous buried sutures, followed by a mucosal layer and an epidermal layer.
Lip Wedge Excision Repair Text: Given the location of the defect and the proximity to free margins a full thickness wedge repair was deemed most appropriate.  Using a sterile surgical marker, the appropriate repair was drawn incorporating the defect and placing the expected incisions perpendicular to the vermilion border.  The vermilion border was also meticulously outlined to ensure appropriate reapproximation during the repair.  The area thus outlined was incised through and through with a #15 scalpel blade.  The muscularis and dermis were reaproximated with deep sutures following hemostasis. Care was taken to realign the vermilion border before proceeding with the superficial closure.  Once the vermilion was realigned the superfical and mucosal closure was finished.
Ftsg Text: The defect edges were debeveled with a #15 scalpel blade.  Given the location of the defect, shape of the defect and the proximity to free margins a full thickness skin graft was deemed most appropriate.  Using a sterile surgical marker, the primary defect shape was transferred to the donor site. The area thus outlined was incised deep to adipose tissue with a #15 scalpel blade.  The harvested graft was then trimmed of adipose tissue until only dermis and epidermis was left.  The skin margins of the secondary defect were undermined to an appropriate distance in all directions utilizing iris scissors.  The secondary defect was closed with interrupted buried subcutaneous sutures.  The skin edges were then re-apposed with running  sutures.  The skin graft was then placed in the primary defect and oriented appropriately.
Split-Thickness Skin Graft Text: The defect edges were debeveled with a #15 scalpel blade.  Given the location of the defect, shape of the defect and the proximity to free margins a split thickness skin graft was deemed most appropriate.  Using a sterile surgical marker, the primary defect shape was transferred to the donor site. The split thickness graft was then harvested.  The skin graft was then placed in the primary defect and oriented appropriately.
Pinch Graft Text: The defect edges were debeveled with a #15 scalpel blade. Given the location of the defect, shape of the defect and the proximity to free margins a pinch graft was deemed most appropriate. Using a sterile surgical marker, the primary defect shape was transferred to the donor site. The area thus outlined was incised deep to adipose tissue with a #15 scalpel blade.  The harvested graft was then trimmed of adipose tissue until only dermis and epidermis was left. The skin margins of the secondary defect were undermined to an appropriate distance in all directions utilizing iris scissors.  The secondary defect was closed with interrupted buried subcutaneous sutures.  The skin edges were then re-apposed with running  sutures.  The skin graft was then placed in the primary defect and oriented appropriately.
Burow's Graft Text: The defect edges were debeveled with a #15 scalpel blade. Given the location of the defect, shape of the defect, the proximity to free margins and the presence of a standing cone deformity a Burow's skin graft was deemed most appropriate. The standing cone was removed and this tissue was then trimmed to the shape of the primary defect. The adipose tissue was also removed until only dermis and epidermis were left.  The skin margins of the secondary defect were undermined to an appropriate distance in all directions utilizing iris scissors.  The secondary defect was closed with interrupted buried subcutaneous sutures.  The skin edges were then re-apposed with running  sutures.  The skin graft was then placed in the primary defect and oriented appropriately.
Cartilage Graft Text: The defect edges were debeveled with a #15 scalpel blade.  Given the location of the defect, shape of the defect, the fact the defect involved a full thickness cartilage defect a cartilage graft was deemed most appropriate.  An appropriate donor site was identified, cleansed, and anesthetized. The cartilage graft was then harvested and transferred to the recipient site, oriented appropriately and then sutured into place.  The secondary defect was then repaired using a primary closure.
Composite Graft Text: The defect edges were debeveled with a #15 scalpel blade.  Given the location of the defect, shape of the defect, the proximity to free margins and the fact the defect was full thickness a composite graft was deemed most appropriate.  The defect was outline and then transferred to the donor site.  A full thickness graft was then excised from the donor site. The graft was then placed in the primary defect, oriented appropriately and then sutured into place.  The secondary defect was then repaired using a primary closure.
Epidermal Autograft Text: The defect edges were debeveled with a #15 scalpel blade.  Given the location of the defect, shape of the defect and the proximity to free margins an epidermal autograft was deemed most appropriate.  Using a sterile surgical marker, the primary defect shape was transferred to the donor site. The epidermal graft was then harvested.  The skin graft was then placed in the primary defect and oriented appropriately.
Dermal Autograft Text: The defect edges were debeveled with a #15 scalpel blade.  Given the location of the defect, shape of the defect and the proximity to free margins a dermal autograft was deemed most appropriate.  Using a sterile surgical marker, the primary defect shape was transferred to the donor site. The area thus outlined was incised deep to adipose tissue with a #15 scalpel blade.  The harvested graft was then trimmed of adipose and epidermal tissue until only dermis was left.  The skin graft was then placed in the primary defect and oriented appropriately.
Skin Substitute Text: The defect edges were debeveled with a #15 scalpel blade.  Given the location of the defect, shape of the defect and the proximity to free margins a skin substitute graft was deemed most appropriate.  The graft material was trimmed to fit the size of the defect. The graft was then placed in the primary defect and oriented appropriately.
Tissue Cultured Epidermal Autograft Text: The defect edges were debeveled with a #15 scalpel blade.  Given the location of the defect, shape of the defect and the proximity to free margins a tissue cultured epidermal autograft was deemed most appropriate.  The graft was then trimmed to fit the size of the defect.  The graft was then placed in the primary defect and oriented appropriately.
Xenograft Text: The defect edges were debeveled with a #15 scalpel blade.  Given the location of the defect, shape of the defect and the proximity to free margins a xenograft was deemed most appropriate.  The graft was then trimmed to fit the size of the defect.  The graft was then placed in the primary defect and oriented appropriately.
Purse String (Intermediate) Text: Given the location of the defect and the characteristics of the surrounding skin a purse string intermediate closure was deemed most appropriate.  Undermining was performed circumfirentially around the surgical defect.  A purse string suture was then placed and tightened.
Purse String (Simple) Text: Given the location of the defect and the characteristics of the surrounding skin a purse string simple closure was deemed most appropriate.  Undermining was performed circumferentially around the surgical defect.  A purse string suture was then placed and tightened.
Partial Purse String (Intermediate) Text: Given the location of the defect and the characteristics of the surrounding skin an intermediate purse string closure was deemed most appropriate.  Undermining was performed circumferentially around the surgical defect.  A purse string suture was then placed and tightened. Wound tension of the circular defect prevented complete closure of the wound.
Partial Purse String (Simple) Text: Given the location of the defect and the characteristics of the surrounding skin a simple purse string closure was deemed most appropriate.  Undermining was performed circumferentially around the surgical defect.  A purse string suture was then placed and tightened. Wound tension of the circular defect prevented complete closure of the wound.
Complex Repair And Single Advancement Flap Text: The defect edges were debeveled with a #15 scalpel blade.  The primary defect was closed partially with a complex linear closure.  Given the location of the remaining defect, shape of the defect and the proximity to free margins a single advancement flap was deemed most appropriate for complete closure of the defect.  Using a sterile surgical marker, an appropriate advancement flap was drawn incorporating the defect and placing the expected incisions within the relaxed skin tension lines where possible.    The area thus outlined was incised deep to adipose tissue with a #15 scalpel blade.  The skin margins were undermined to an appropriate distance in all directions utilizing iris scissors.
Complex Repair And Double Advancement Flap Text: The defect edges were debeveled with a #15 scalpel blade.  The primary defect was closed partially with a complex linear closure.  Given the location of the remaining defect, shape of the defect and the proximity to free margins a double advancement flap was deemed most appropriate for complete closure of the defect.  Using a sterile surgical marker, an appropriate advancement flap was drawn incorporating the defect and placing the expected incisions within the relaxed skin tension lines where possible.    The area thus outlined was incised deep to adipose tissue with a #15 scalpel blade.  The skin margins were undermined to an appropriate distance in all directions utilizing iris scissors.
Complex Repair And Modified Advancement Flap Text: The defect edges were debeveled with a #15 scalpel blade.  The primary defect was closed partially with a complex linear closure.  Given the location of the remaining defect, shape of the defect and the proximity to free margins a modified advancement flap was deemed most appropriate for complete closure of the defect.  Using a sterile surgical marker, an appropriate advancement flap was drawn incorporating the defect and placing the expected incisions within the relaxed skin tension lines where possible.    The area thus outlined was incised deep to adipose tissue with a #15 scalpel blade.  The skin margins were undermined to an appropriate distance in all directions utilizing iris scissors.
Complex Repair And A-T Advancement Flap Text: The defect edges were debeveled with a #15 scalpel blade.  The primary defect was closed partially with a complex linear closure.  Given the location of the remaining defect, shape of the defect and the proximity to free margins an A-T advancement flap was deemed most appropriate for complete closure of the defect.  Using a sterile surgical marker, an appropriate advancement flap was drawn incorporating the defect and placing the expected incisions within the relaxed skin tension lines where possible.    The area thus outlined was incised deep to adipose tissue with a #15 scalpel blade.  The skin margins were undermined to an appropriate distance in all directions utilizing iris scissors.
Complex Repair And O-T Advancement Flap Text: The defect edges were debeveled with a #15 scalpel blade.  The primary defect was closed partially with a complex linear closure.  Given the location of the remaining defect, shape of the defect and the proximity to free margins an O-T advancement flap was deemed most appropriate for complete closure of the defect.  Using a sterile surgical marker, an appropriate advancement flap was drawn incorporating the defect and placing the expected incisions within the relaxed skin tension lines where possible.    The area thus outlined was incised deep to adipose tissue with a #15 scalpel blade.  The skin margins were undermined to an appropriate distance in all directions utilizing iris scissors.
Complex Repair And O-L Flap Text: The defect edges were debeveled with a #15 scalpel blade.  The primary defect was closed partially with a complex linear closure.  Given the location of the remaining defect, shape of the defect and the proximity to free margins an O-L flap was deemed most appropriate for complete closure of the defect.  Using a sterile surgical marker, an appropriate flap was drawn incorporating the defect and placing the expected incisions within the relaxed skin tension lines where possible.    The area thus outlined was incised deep to adipose tissue with a #15 scalpel blade.  The skin margins were undermined to an appropriate distance in all directions utilizing iris scissors.
Complex Repair And Bilobe Flap Text: The defect edges were debeveled with a #15 scalpel blade.  The primary defect was closed partially with a complex linear closure.  Given the location of the remaining defect, shape of the defect and the proximity to free margins a bilobe flap was deemed most appropriate for complete closure of the defect.  Using a sterile surgical marker, an appropriate advancement flap was drawn incorporating the defect and placing the expected incisions within the relaxed skin tension lines where possible.    The area thus outlined was incised deep to adipose tissue with a #15 scalpel blade.  The skin margins were undermined to an appropriate distance in all directions utilizing iris scissors.
Complex Repair And Melolabial Flap Text: The defect edges were debeveled with a #15 scalpel blade.  The primary defect was closed partially with a complex linear closure.  Given the location of the remaining defect, shape of the defect and the proximity to free margins a melolabial flap was deemed most appropriate for complete closure of the defect.  Using a sterile surgical marker, an appropriate advancement flap was drawn incorporating the defect and placing the expected incisions within the relaxed skin tension lines where possible.    The area thus outlined was incised deep to adipose tissue with a #15 scalpel blade.  The skin margins were undermined to an appropriate distance in all directions utilizing iris scissors.
Complex Repair And Rotation Flap Text: The defect edges were debeveled with a #15 scalpel blade.  The primary defect was closed partially with a complex linear closure.  Given the location of the remaining defect, shape of the defect and the proximity to free margins a rotation flap was deemed most appropriate for complete closure of the defect.  Using a sterile surgical marker, an appropriate advancement flap was drawn incorporating the defect and placing the expected incisions within the relaxed skin tension lines where possible.    The area thus outlined was incised deep to adipose tissue with a #15 scalpel blade.  The skin margins were undermined to an appropriate distance in all directions utilizing iris scissors.
Complex Repair And Rhombic Flap Text: The defect edges were debeveled with a #15 scalpel blade.  The primary defect was closed partially with a complex linear closure.  Given the location of the remaining defect, shape of the defect and the proximity to free margins a rhombic flap was deemed most appropriate for complete closure of the defect.  Using a sterile surgical marker, an appropriate advancement flap was drawn incorporating the defect and placing the expected incisions within the relaxed skin tension lines where possible.    The area thus outlined was incised deep to adipose tissue with a #15 scalpel blade.  The skin margins were undermined to an appropriate distance in all directions utilizing iris scissors.
Complex Repair And Transposition Flap Text: The defect edges were debeveled with a #15 scalpel blade.  The primary defect was closed partially with a complex linear closure.  Given the location of the remaining defect, shape of the defect and the proximity to free margins a transposition flap was deemed most appropriate for complete closure of the defect.  Using a sterile surgical marker, an appropriate advancement flap was drawn incorporating the defect and placing the expected incisions within the relaxed skin tension lines where possible.    The area thus outlined was incised deep to adipose tissue with a #15 scalpel blade.  The skin margins were undermined to an appropriate distance in all directions utilizing iris scissors.
Complex Repair And V-Y Plasty Text: The defect edges were debeveled with a #15 scalpel blade.  The primary defect was closed partially with a complex linear closure.  Given the location of the remaining defect, shape of the defect and the proximity to free margins a V-Y plasty was deemed most appropriate for complete closure of the defect.  Using a sterile surgical marker, an appropriate advancement flap was drawn incorporating the defect and placing the expected incisions within the relaxed skin tension lines where possible.    The area thus outlined was incised deep to adipose tissue with a #15 scalpel blade.  The skin margins were undermined to an appropriate distance in all directions utilizing iris scissors.
Complex Repair And M Plasty Text: The defect edges were debeveled with a #15 scalpel blade.  The primary defect was closed partially with a complex linear closure.  Given the location of the remaining defect, shape of the defect and the proximity to free margins an M plasty was deemed most appropriate for complete closure of the defect.  Using a sterile surgical marker, an appropriate advancement flap was drawn incorporating the defect and placing the expected incisions within the relaxed skin tension lines where possible.    The area thus outlined was incised deep to adipose tissue with a #15 scalpel blade.  The skin margins were undermined to an appropriate distance in all directions utilizing iris scissors.
Complex Repair And Double M Plasty Text: The defect edges were debeveled with a #15 scalpel blade.  The primary defect was closed partially with a complex linear closure.  Given the location of the remaining defect, shape of the defect and the proximity to free margins a double M plasty was deemed most appropriate for complete closure of the defect.  Using a sterile surgical marker, an appropriate advancement flap was drawn incorporating the defect and placing the expected incisions within the relaxed skin tension lines where possible.    The area thus outlined was incised deep to adipose tissue with a #15 scalpel blade.  The skin margins were undermined to an appropriate distance in all directions utilizing iris scissors.
Complex Repair And W Plasty Text: The defect edges were debeveled with a #15 scalpel blade.  The primary defect was closed partially with a complex linear closure.  Given the location of the remaining defect, shape of the defect and the proximity to free margins a W plasty was deemed most appropriate for complete closure of the defect.  Using a sterile surgical marker, an appropriate advancement flap was drawn incorporating the defect and placing the expected incisions within the relaxed skin tension lines where possible.    The area thus outlined was incised deep to adipose tissue with a #15 scalpel blade.  The skin margins were undermined to an appropriate distance in all directions utilizing iris scissors.
Complex Repair And Z Plasty Text: The defect edges were debeveled with a #15 scalpel blade.  The primary defect was closed partially with a complex linear closure.  Given the location of the remaining defect, shape of the defect and the proximity to free margins a Z plasty was deemed most appropriate for complete closure of the defect.  Using a sterile surgical marker, an appropriate advancement flap was drawn incorporating the defect and placing the expected incisions within the relaxed skin tension lines where possible.    The area thus outlined was incised deep to adipose tissue with a #15 scalpel blade.  The skin margins were undermined to an appropriate distance in all directions utilizing iris scissors.
Complex Repair And Dorsal Nasal Flap Text: The defect edges were debeveled with a #15 scalpel blade.  The primary defect was closed partially with a complex linear closure.  Given the location of the remaining defect, shape of the defect and the proximity to free margins a dorsal nasal flap was deemed most appropriate for complete closure of the defect.  Using a sterile surgical marker, an appropriate flap was drawn incorporating the defect and placing the expected incisions within the relaxed skin tension lines where possible.    The area thus outlined was incised deep to adipose tissue with a #15 scalpel blade.  The skin margins were undermined to an appropriate distance in all directions utilizing iris scissors.
Complex Repair And Ftsg Text: The defect edges were debeveled with a #15 scalpel blade.  The primary defect was closed partially with a complex linear closure.  Given the location of the defect, shape of the defect and the proximity to free margins a full thickness skin graft was deemed most appropriate to repair the remaining defect.  The graft was trimmed to fit the size of the remaining defect.  The graft was then placed in the primary defect, oriented appropriately, and sutured into place.
Complex Repair And Burow's Graft Text: The defect edges were debeveled with a #15 scalpel blade.  The primary defect was closed partially with a complex linear closure.  Given the location of the defect, shape of the defect, the proximity to free margins and the presence of a standing cone deformity a Burow's graft was deemed most appropriate to repair the remaining defect.  The graft was trimmed to fit the size of the remaining defect.  The graft was then placed in the primary defect, oriented appropriately, and sutured into place.
Complex Repair And Split-Thickness Skin Graft Text: The defect edges were debeveled with a #15 scalpel blade.  The primary defect was closed partially with a complex linear closure.  Given the location of the defect, shape of the defect and the proximity to free margins a split thickness skin graft was deemed most appropriate to repair the remaining defect.  The graft was trimmed to fit the size of the remaining defect.  The graft was then placed in the primary defect, oriented appropriately, and sutured into place.
Complex Repair And Epidermal Autograft Text: The defect edges were debeveled with a #15 scalpel blade.  The primary defect was closed partially with a complex linear closure.  Given the location of the defect, shape of the defect and the proximity to free margins an epidermal autograft was deemed most appropriate to repair the remaining defect.  The graft was trimmed to fit the size of the remaining defect.  The graft was then placed in the primary defect, oriented appropriately, and sutured into place.
Complex Repair And Dermal Autograft Text: The defect edges were debeveled with a #15 scalpel blade.  The primary defect was closed partially with a complex linear closure.  Given the location of the defect, shape of the defect and the proximity to free margins an dermal autograft was deemed most appropriate to repair the remaining defect.  The graft was trimmed to fit the size of the remaining defect.  The graft was then placed in the primary defect, oriented appropriately, and sutured into place.
Complex Repair And Tissue Cultured Epidermal Autograft Text: The defect edges were debeveled with a #15 scalpel blade.  The primary defect was closed partially with a complex linear closure.  Given the location of the defect, shape of the defect and the proximity to free margins an tissue cultured epidermal autograft was deemed most appropriate to repair the remaining defect.  The graft was trimmed to fit the size of the remaining defect.  The graft was then placed in the primary defect, oriented appropriately, and sutured into place.
Complex Repair And Xenograft Text: The defect edges were debeveled with a #15 scalpel blade.  The primary defect was closed partially with a complex linear closure.  Given the location of the defect, shape of the defect and the proximity to free margins a xenograft was deemed most appropriate to repair the remaining defect.  The graft was trimmed to fit the size of the remaining defect.  The graft was then placed in the primary defect, oriented appropriately, and sutured into place.
Complex Repair And Skin Substitute Graft Text: The defect edges were debeveled with a #15 scalpel blade.  The primary defect was closed partially with a complex linear closure.  Given the location of the remaining defect, shape of the defect and the proximity to free margins a skin substitute graft was deemed most appropriate to repair the remaining defect.  The graft was trimmed to fit the size of the remaining defect.  The graft was then placed in the primary defect, oriented appropriately, and sutured into place.
Path Notes (To The Dermatopathologist): Please check margins. Nicked  at 6
Consent was obtained from the patient. The risks and benefits to therapy were discussed in detail. Specifically, the risks of infection, scarring, bleeding, prolonged wound healing, incomplete removal, allergy to anesthesia, nerve injury and recurrence were addressed. Prior to the procedure, the treatment site was clearly identified and confirmed by the patient. All components of Universal Protocol/PAUSE Rule completed.
Post-Care Instructions: I reviewed with the patient in detail post-care instructions. Patient is not to engage in any heavy lifting, exercise, or swimming for the next 14 days. Should the patient develop any fevers, chills, bleeding, severe pain patient will contact the office immediately.
Home Suture Removal Text: Patient was provided a home suture removal kit and will remove their sutures at home.  If they have any questions or difficulties they will call the office.
Where Do You Want The Question To Include Opioid Counseling Located?: Case Summary Tab
Information: Selecting Yes will display possible errors in your note based on the variables you have selected. This validation is only offered as a suggestion for you. PLEASE NOTE THAT THE VALIDATION TEXT WILL BE REMOVED WHEN YOU FINALIZE YOUR NOTE. IF YOU WANT TO FAX A PRELIMINARY NOTE YOU WILL NEED TO TOGGLE THIS TO 'NO' IF YOU DO NOT WANT IT IN YOUR FAXED NOTE.

## 2024-03-21 ENCOUNTER — APPOINTMENT (RX ONLY)
Dept: URBAN - METROPOLITAN AREA CLINIC 150 | Facility: CLINIC | Age: 79
Setting detail: DERMATOLOGY
End: 2024-03-21

## 2024-03-21 DIAGNOSIS — Z48.02 ENCOUNTER FOR REMOVAL OF SUTURES: ICD-10-CM

## 2024-03-21 PROCEDURE — ? SUTURE REMOVAL (GLOBAL PERIOD)

## 2024-03-21 ASSESSMENT — LOCATION SIMPLE DESCRIPTION DERM: LOCATION SIMPLE: RIGHT FOREARM

## 2024-03-21 ASSESSMENT — LOCATION DETAILED DESCRIPTION DERM: LOCATION DETAILED: RIGHT DISTAL DORSAL FOREARM

## 2024-03-21 ASSESSMENT — PAIN INTENSITY VAS: HOW INTENSE IS YOUR PAIN 0 BEING NO PAIN, 10 BEING THE MOST SEVERE PAIN POSSIBLE?: 1/10 PAIN

## 2024-03-21 ASSESSMENT — LOCATION ZONE DERM: LOCATION ZONE: ARM

## 2024-03-21 NOTE — PROCEDURE: SUTURE REMOVAL (GLOBAL PERIOD)
Detail Level: Detailed
Add 42305 Cpt? (Important Note: In 2017 The Use Of 36737 Is Being Tracked By Cms To Determine Future Global Period Reimbursement For Global Periods): no

## 2024-04-02 ENCOUNTER — APPOINTMENT (RX ONLY)
Dept: URBAN - METROPOLITAN AREA CLINIC 151 | Facility: CLINIC | Age: 79
Setting detail: DERMATOLOGY
End: 2024-04-02

## 2024-04-02 DIAGNOSIS — Z02.9 ENCOUNTER FOR ADMINISTRATIVE EXAMINATIONS, UNSPECIFIED: ICD-10-CM

## 2024-04-02 PROCEDURE — ? ADDITIONAL NOTES

## 2024-04-02 NOTE — PROCEDURE: ADDITIONAL NOTES
Render Risk Assessment In Note?: no
Detail Level: Simple
Additional Notes: Patient came in too early for full body.    Follow up in May/June

## 2024-04-11 ENCOUNTER — APPOINTMENT (RX ONLY)
Dept: URBAN - METROPOLITAN AREA CLINIC 150 | Facility: CLINIC | Age: 79
Setting detail: DERMATOLOGY
End: 2024-04-11

## 2024-04-11 PROBLEM — D03.61 MELANOMA IN SITU OF RIGHT UPPER LIMB, INCLUDING SHOULDER: Status: ACTIVE | Noted: 2024-04-11

## 2024-04-11 PROCEDURE — ? COUNSELING

## 2024-04-11 PROCEDURE — 11604 EXC TR-EXT MAL+MARG 3.1-4 CM: CPT

## 2024-04-11 PROCEDURE — 13121 CMPLX RPR S/A/L 2.6-7.5 CM: CPT

## 2024-04-11 PROCEDURE — ? EXCISION

## 2024-04-11 NOTE — PROCEDURE: EXCISION
Surgeon Performing The Repair (Optional): Warner Iglesias M.D.
Accession #: [id number]
Date Of Previous Biopsy (Optional): 03/06/2024
Size Of Lesion In Cm: 1.1
X Size Of Lesion In Cm (Optional): 0
Size Of Margin In Cm: 1
Anesthesia Volume In Cc: 3
Was An Eye Clamp Used?: No
Eye Clamp Note Details: An eye clamp was used during the procedure.
Excision Method: Fusiform
Saucerization Depth: dermis and superficial adipose tissue
Repair Type: Complex
Intermediate / Complex Repair - Final Wound Length In Cm: 5.6
Suturegard Retention Suture: 2-0 Nylon
Retention Suture Bite Size: 3 mm
Length To Time In Minutes Device Was In Place: 10
Complex Requirements: Extensive Undermining Performed?: Yes
Width Of Defect Perpendicular To Closure In Cm (Required): 0.8
Distance Of Undermining In Cm (Required): 2.9
Undermining Type: Entire Wound
Debridement Text: The wound edges were debrided prior to proceeding with the closure to facilitate wound healing.
Helical Rim Text: The closure involved the helical rim.
Vermilion Border Text: The closure involved the vermilion border.
Nostril Rim Text: The closure involved the nostril rim.
Retention Suture Text: Retention sutures were placed to support the closure and prevent dehiscence.
Primary Defect Length (In Cm): 1.3
Primary Defect Width (In Cm): 0.9
Suture Removal: 14 days
Lab: [id number]
Graft Donor Site Bandage (Optional-Leave Blank If You Don't Want In Note): Steri-strips and a pressure bandage were applied to the donor site.
Epidermal Closure Graft Donor Site (Optional): simple interrupted
Billing Type: Third-Party Bill
Excision Depth: adipose tissue
Scalpel Size: 15 blade
Anesthesia Type: 1% lidocaine with epinephrine
Hemostasis: Electrocautery
Estimated Blood Loss (Cc): minimal
Detail Level: Detailed
Deep Sutures: 4-0 Vicryl
Epidermal Sutures: 3-0 Ethilon
Wound Care: Petrolatum
Dressing: dry sterile dressing
Suturegard Intro: Intraoperative tissue expansion was performed, utilizing the SUTUREGARD device, in order to reduce wound tension.
Suturegard Body: The suture ends were repeatedly re-tightened and re-clamped to achieve the desired tissue expansion.
Hemigard Intro: Due to skin fragility and wound tension, it was decided to use HEMIGARD adhesive retention suture devices to permit a linear closure. The skin was cleaned and dried for a 6cm distance away from the wound. Excessive hair, if present, was removed to allow for adhesion.
Hemigard Postcare Instructions: The HEMIGARD strips are to remain completely dry for at least 5-7 days.
Positioning (Leave Blank If You Do Not Want): The patient was placed in a comfortable position exposing the surgical site.
Pre-Excision Curettage Text (Leave Blank If You Do Not Want): Prior to drawing the surgical margin the visible lesion was removed with electrodesiccation and curettage to clearly define the lesion size.
Complex Repair Preamble Text (Leave Blank If You Do Not Want): Extensive wide undermining was performed.
Intermediate Repair Preamble Text (Leave Blank If You Do Not Want): Undermining was performed with blunt dissection.
Curvilinear Excision Additional Text (Leave Blank If You Do Not Want): The margin was drawn around the clinically apparent lesion.  A curvilinear shape was then drawn on the skin incorporating the lesion and margins.  Incisions were then made along these lines to the appropriate tissue plane and the lesion was extirpated.
Fusiform Excision Additional Text (Leave Blank If You Do Not Want): The margin was drawn around the clinically apparent lesion.  A fusiform shape was then drawn on the skin incorporating the lesion and margins.  Incisions were then made along these lines to the appropriate tissue plane and the lesion was extirpated.
Elliptical Excision Additional Text (Leave Blank If You Do Not Want): The margin was drawn around the clinically apparent lesion.  An elliptical shape was then drawn on the skin incorporating the lesion and margins.  Incisions were then made along these lines to the appropriate tissue plane and the lesion was extirpated.
Saucerization Excision Additional Text (Leave Blank If You Do Not Want): The margin was drawn around the clinically apparent lesion.  Incisions were then made along these lines, in a tangential fashion, to the appropriate tissue plane and the lesion was extirpated.
Slit Excision Additional Text (Leave Blank If You Do Not Want): A linear line was drawn on the skin overlying the lesion. An incision was made slowly until the lesion was visualized.  Once visualized, the lesion was removed with blunt dissection.
Excisional Biopsy Additional Text (Leave Blank If You Do Not Want): The margin was drawn around the clinically apparent lesion. An elliptical shape was then drawn on the skin incorporating the lesion and margins.  Incisions were then made along these lines to the appropriate tissue plane and the lesion was extirpated.
Perilesional Excision Additional Text (Leave Blank If You Do Not Want): The margin was drawn around the clinically apparent lesion. Incisions were then made along these lines to the appropriate tissue plane and the lesion was extirpated.
Repair Performed By Another Provider Text (Leave Blank If You Do Not Want): After the tissue was excised the defect was repaired by another provider.
No Repair - Repaired With Adjacent Surgical Defect Text (Leave Blank If You Do Not Want): After the excision the defect was repaired concurrently with another surgical defect which was in close approximation.
Adjacent Tissue Transfer Text: The defect edges were debeveled with a #15 scalpel blade. Given the location of the defect and the proximity to free margins an adjacent tissue transfer was deemed most appropriate. Using a sterile surgical marker, an appropriate flap was drawn incorporating the defect and placing the expected incisions within the relaxed skin tension lines where possible. The area thus outlined was incised deep to adipose tissue with a #15 scalpel blade. The skin margins were undermined to an appropriate distance in all directions utilizing iris scissors and carried over to close the primary defect.
Advancement Flap (Single) Text: The defect edges were debeveled with a #15 scalpel blade.  Given the location of the defect and the proximity to free margins a single advancement flap was deemed most appropriate.  Using a sterile surgical marker, an appropriate advancement flap was drawn incorporating the defect and placing the expected incisions within the relaxed skin tension lines where possible.    The area thus outlined was incised deep to adipose tissue with a #15 scalpel blade.  The skin margins were undermined to an appropriate distance in all directions utilizing iris scissors.
Advancement Flap (Double) Text: The defect edges were debeveled with a #15 scalpel blade.  Given the location of the defect and the proximity to free margins a double advancement flap was deemed most appropriate.  Using a sterile surgical marker, the appropriate advancement flaps were drawn incorporating the defect and placing the expected incisions within the relaxed skin tension lines where possible.    The area thus outlined was incised deep to adipose tissue with a #15 scalpel blade.  The skin margins were undermined to an appropriate distance in all directions utilizing iris scissors.
Burow's Advancement Flap Text: The defect edges were debeveled with a #15 scalpel blade.  Given the location of the defect and the proximity to free margins a Burow's advancement flap was deemed most appropriate.  Using a sterile surgical marker, the appropriate advancement flap was drawn incorporating the defect and placing the expected incisions within the relaxed skin tension lines where possible.    The area thus outlined was incised deep to adipose tissue with a #15 scalpel blade.  The skin margins were undermined to an appropriate distance in all directions utilizing iris scissors.
Chonodrocutaneous Helical Advancement Flap Text: The defect edges were debeveled with a #15 scalpel blade. Given the location of the defect and the proximity to free margins a chondrocutaneous helical advancement flap was deemed most appropriate. Using a sterile surgical marker, the appropriate advancement flap was drawn incorporating the defect and placing the expected incisions within the relaxed skin tension lines where possible. The area thus outlined was incised deep to adipose tissue with a #15 scalpel blade. The skin margins were undermined to an appropriate distance in all directions utilizing iris scissors. Following this, the designed flap was advanced and carried over into the primary defect and sutured into place.
Crescentic Advancement Flap Text: The defect edges were debeveled with a #15 scalpel blade.  Given the location of the defect and the proximity to free margins a crescentic advancement flap was deemed most appropriate.  Using a sterile surgical marker, the appropriate advancement flap was drawn incorporating the defect and placing the expected incisions within the relaxed skin tension lines where possible.    The area thus outlined was incised deep to adipose tissue with a #15 scalpel blade.  The skin margins were undermined to an appropriate distance in all directions utilizing iris scissors.
A-T Advancement Flap Text: The defect edges were debeveled with a #15 scalpel blade.  Given the location of the defect, shape of the defect and the proximity to free margins an A-T advancement flap was deemed most appropriate.  Using a sterile surgical marker, an appropriate advancement flap was drawn incorporating the defect and placing the expected incisions within the relaxed skin tension lines where possible.    The area thus outlined was incised deep to adipose tissue with a #15 scalpel blade.  The skin margins were undermined to an appropriate distance in all directions utilizing iris scissors.
O-T Advancement Flap Text: The defect edges were debeveled with a #15 scalpel blade.  Given the location of the defect, shape of the defect and the proximity to free margins an O-T advancement flap was deemed most appropriate.  Using a sterile surgical marker, an appropriate advancement flap was drawn incorporating the defect and placing the expected incisions within the relaxed skin tension lines where possible.    The area thus outlined was incised deep to adipose tissue with a #15 scalpel blade.  The skin margins were undermined to an appropriate distance in all directions utilizing iris scissors.
O-L Flap Text: The defect edges were debeveled with a #15 scalpel blade.  Given the location of the defect, shape of the defect and the proximity to free margins an O-L flap was deemed most appropriate.  Using a sterile surgical marker, an appropriate advancement flap was drawn incorporating the defect and placing the expected incisions within the relaxed skin tension lines where possible.    The area thus outlined was incised deep to adipose tissue with a #15 scalpel blade.  The skin margins were undermined to an appropriate distance in all directions utilizing iris scissors.
O-Z Flap Text: The defect edges were debeveled with a #15 scalpel blade. Given the location of the defect, shape of the defect and the proximity to free margins an O-Z flap was deemed most appropriate. Using a sterile surgical marker, an appropriate transposition flap was drawn incorporating the defect and placing the expected incisions within the relaxed skin tension lines where possible. The area thus outlined was incised deep to adipose tissue with a #15 scalpel blade. The skin margins were undermined to an appropriate distance in all directions utilizing iris scissors. Following this, the designed flap was carried over into the primary defect and sutured into place.
Double O-Z Flap Text: The defect edges were debeveled with a #15 scalpel blade. Given the location of the defect, shape of the defect and the proximity to free margins a Double O-Z flap was deemed most appropriate. Using a sterile surgical marker, an appropriate transposition flap was drawn incorporating the defect and placing the expected incisions within the relaxed skin tension lines where possible. The area thus outlined was incised deep to adipose tissue with a #15 scalpel blade. The skin margins were undermined to an appropriate distance in all directions utilizing iris scissors. Following this, the designed flap was carried over into the primary defect and sutured into place.
V-Y Flap Text: The defect edges were debeveled with a #15 scalpel blade.  Given the location of the defect, shape of the defect and the proximity to free margins a V-Y flap was deemed most appropriate.  Using a sterile surgical marker, an appropriate advancement flap was drawn incorporating the defect and placing the expected incisions within the relaxed skin tension lines where possible.    The area thus outlined was incised deep to adipose tissue with a #15 scalpel blade.  The skin margins were undermined to an appropriate distance in all directions utilizing iris scissors.
Advancement-Rotation Flap Text: The defect edges were debeveled with a #15 scalpel blade.  Given the location of the defect, shape of the defect and the proximity to free margins an advancement-rotation flap was deemed most appropriate.  Using a sterile surgical marker, an appropriate flap was drawn incorporating the defect and placing the expected incisions within the relaxed skin tension lines where possible. The area thus outlined was incised deep to adipose tissue with a #15 scalpel blade.  The skin margins were undermined to an appropriate distance in all directions utilizing iris scissors.
Mercedes Flap Text: The defect edges were debeveled with a #15 scalpel blade.  Given the location of the defect, shape of the defect and the proximity to free margins a Mercedes flap was deemed most appropriate.  Using a sterile surgical marker, an appropriate advancement flap was drawn incorporating the defect and placing the expected incisions within the relaxed skin tension lines where possible. The area thus outlined was incised deep to adipose tissue with a #15 scalpel blade.  The skin margins were undermined to an appropriate distance in all directions utilizing iris scissors.
Modified Advancement Flap Text: The defect edges were debeveled with a #15 scalpel blade.  Given the location of the defect, shape of the defect and the proximity to free margins a modified advancement flap was deemed most appropriate.  Using a sterile surgical marker, an appropriate advancement flap was drawn incorporating the defect and placing the expected incisions within the relaxed skin tension lines where possible.    The area thus outlined was incised deep to adipose tissue with a #15 scalpel blade.  The skin margins were undermined to an appropriate distance in all directions utilizing iris scissors.
Mucosal Advancement Flap Text: Given the location of the defect, shape of the defect and the proximity to free margins a mucosal advancement flap was deemed most appropriate. Incisions were made with a 15 blade scalpel in the appropriate fashion along the cutaneous vermilion border and the mucosal lip. The remaining actinically damaged mucosal tissue was excised.  The mucosal advancement flap was then elevated to the gingival sulcus with care taken to preserve the neurovascular structures and advanced into the primary defect. Care was taken to ensure that precise realignment of the vermilion border was achieved.
Peng Advancement Flap Text: The defect edges were debeveled with a #15 scalpel blade. Given the location of the defect, shape of the defect and the proximity to free margins a Peng advancement flap was deemed most appropriate. Using a sterile surgical marker, an appropriate advancement flap was drawn incorporating the defect and placing the expected incisions within the relaxed skin tension lines where possible. The area thus outlined was incised deep to adipose tissue with a #15 scalpel blade. The skin margins were undermined to an appropriate distance in all directions utilizing iris scissors. Following this, the designed flap was advanced and carried over into the primary defect and sutured into place.
Hatchet Flap Text: The defect edges were debeveled with a #15 scalpel blade.  Given the location of the defect, shape of the defect and the proximity to free margins a hatchet flap was deemed most appropriate.  Using a sterile surgical marker, an appropriate hatchet flap was drawn incorporating the defect and placing the expected incisions within the relaxed skin tension lines where possible.    The area thus outlined was incised deep to adipose tissue with a #15 scalpel blade.  The skin margins were undermined to an appropriate distance in all directions utilizing iris scissors.
Rotation Flap Text: The defect edges were debeveled with a #15 scalpel blade.  Given the location of the defect, shape of the defect and the proximity to free margins a rotation flap was deemed most appropriate.  Using a sterile surgical marker, an appropriate rotation flap was drawn incorporating the defect and placing the expected incisions within the relaxed skin tension lines where possible.    The area thus outlined was incised deep to adipose tissue with a #15 scalpel blade.  The skin margins were undermined to an appropriate distance in all directions utilizing iris scissors.
Bilateral Rotation Flap Text: The defect edges were debeveled with a #15 scalpel blade. Given the location of the defect, shape of the defect and the proximity to free margins a bilateral rotation flap was deemed most appropriate. Using a sterile surgical marker, an appropriate rotation flap was drawn incorporating the defect and placing the expected incisions within the relaxed skin tension lines where possible. The area thus outlined was incised deep to adipose tissue with a #15 scalpel blade. The skin margins were undermined to an appropriate distance in all directions utilizing iris scissors. Following this, the designed flap was carried over into the primary defect and sutured into place.
Spiral Flap Text: The defect edges were debeveled with a #15 scalpel blade.  Given the location of the defect, shape of the defect and the proximity to free margins a spiral flap was deemed most appropriate.  Using a sterile surgical marker, an appropriate rotation flap was drawn incorporating the defect and placing the expected incisions within the relaxed skin tension lines where possible. The area thus outlined was incised deep to adipose tissue with a #15 scalpel blade.  The skin margins were undermined to an appropriate distance in all directions utilizing iris scissors.
Staged Advancement Flap Text: The defect edges were debeveled with a #15 scalpel blade. Given the location of the defect, shape of the defect and the proximity to free margins a staged advancement flap was deemed most appropriate. Using a sterile surgical marker, an appropriate advancement flap was drawn incorporating the defect and placing the expected incisions within the relaxed skin tension lines where possible. The area thus outlined was incised deep to adipose tissue with a #15 scalpel blade. The skin margins were undermined to an appropriate distance in all directions utilizing iris scissors. Following this, the designed flap was carried over into the primary defect and sutured into place.
Star Wedge Flap Text: The defect edges were debeveled with a #15 scalpel blade.  Given the location of the defect, shape of the defect and the proximity to free margins a star wedge flap was deemed most appropriate.  Using a sterile surgical marker, an appropriate rotation flap was drawn incorporating the defect and placing the expected incisions within the relaxed skin tension lines where possible. The area thus outlined was incised deep to adipose tissue with a #15 scalpel blade.  The skin margins were undermined to an appropriate distance in all directions utilizing iris scissors.
Transposition Flap Text: The defect edges were debeveled with a #15 scalpel blade.  Given the location of the defect and the proximity to free margins a transposition flap was deemed most appropriate.  Using a sterile surgical marker, an appropriate transposition flap was drawn incorporating the defect.    The area thus outlined was incised deep to adipose tissue with a #15 scalpel blade.  The skin margins were undermined to an appropriate distance in all directions utilizing iris scissors.
Muscle Hinge Flap Text: The defect edges were debeveled with a #15 scalpel blade.  Given the size, depth and location of the defect and the proximity to free margins a muscle hinge flap was deemed most appropriate.  Using a sterile surgical marker, an appropriate hinge flap was drawn incorporating the defect. The area thus outlined was incised with a #15 scalpel blade.  The skin margins were undermined to an appropriate distance in all directions utilizing iris scissors.
Mustarde Flap Text: The defect edges were debeveled with a #15 scalpel blade.  Given the size, depth and location of the defect and the proximity to free margins a Mustarde flap was deemed most appropriate. Using a sterile surgical marker, an appropriate flap was drawn incorporating the defect. The area thus outlined was incised with a #15 scalpel blade. The skin margins were undermined to an appropriate distance in all directions utilizing iris scissors. Following this, the designed flap was carried into the primary defect and sutured into place.
Nasal Turnover Hinge Flap Text: The defect edges were debeveled with a #15 scalpel blade.  Given the size, depth, location of the defect and the defect being full thickness a nasal turnover hinge flap was deemed most appropriate. Using a sterile surgical marker, an appropriate hinge flap was drawn incorporating the defect. The area thus outlined was incised with a #15 scalpel blade. The flap was designed to recreate the nasal mucosal lining and the alar rim. The skin margins were undermined to an appropriate distance in all directions utilizing iris scissors. Following this, the designed flap was carried over into the primary defect and sutured into place
Nasalis-Muscle-Based Myocutaneous Island Pedicle Flap Text: Using a #15 blade, an incision was made around the donor flap to the level of the nasalis muscle. Wide lateral undermining was then performed in both the subcutaneous plane above the nasalis muscle, and in a submuscular plane just above periosteum. This allowed the formation of a free nasalis muscle axial pedicle (based on the angular artery) which was still attached to the actual cutaneous flap, increasing its mobility and vascular viability. Hemostasis was obtained with pinpoint electrocoagulation. The flap was mobilized into position and the pivotal anchor points positioned and stabilized with buried interrupted sutures. Subcutaneous and dermal tissues were closed in a multilayered fashion with sutures. Tissue redundancies were excised, and the epidermal edges were apposed without significant tension and sutured with sutures.
Nasalis Myocutaneous Flap Text: Using a #15 blade, an incision was made around the donor flap to the level of the nasalis muscle. Wide lateral undermining was then performed in both the subcutaneous plane above the nasalis muscle, and in a submuscular plane just above periosteum. This allowed the formation of a free nasalis muscle axial pedicle which was still attached to the actual cutaneous flap, increasing its mobility and vascular viability. Hemostasis was obtained with pinpoint electrocoagulation. The flap was mobilized into position and the pivotal anchor points positioned and stabilized with buried interrupted sutures. Subcutaneous and dermal tissues were closed in a multilayered fashion with sutures. Tissue redundancies were excised, and the epidermal edges were apposed without significant tension and sutured with sutures.
Orbicularis Oris Muscle Flap Text: The defect edges were debeveled with a #15 scalpel blade.  Given that the defect affected the competency of the oral sphincter an orbicularis oris muscle flap was deemed most appropriate to restore this competency and normal muscle function.  Using a sterile surgical marker, an appropriate flap was drawn incorporating the defect. The area thus outlined was incised with a #15 scalpel blade. Following this, the designed flap was carried over into the primary defect and sutured into place.
Melolabial Transposition Flap Text: The defect edges were debeveled with a #15 scalpel blade.  Given the location of the defect and the proximity to free margins a melolabial flap was deemed most appropriate.  Using a sterile surgical marker, an appropriate melolabial transposition flap was drawn incorporating the defect.    The area thus outlined was incised deep to adipose tissue with a #15 scalpel blade.  The skin margins were undermined to an appropriate distance in all directions utilizing iris scissors.
Rectangular Flap Text: The defect edges were debeveled with a #15 scalpel blade. Given the location of the defect and the proximity to free margins a rectangular flap was deemed most appropriate. Using a sterile surgical marker, an appropriate rectangular flap was drawn incorporating the defect. The area thus outlined was incised deep to adipose tissue with a #15 scalpel blade. The skin margins were undermined to an appropriate distance in all directions utilizing iris scissors. Following this, the designed flap was carried over into the primary defect and sutured into place.
Rhombic Flap Text: The defect edges were debeveled with a #15 scalpel blade.  Given the location of the defect and the proximity to free margins a rhombic flap was deemed most appropriate.  Using a sterile surgical marker, an appropriate rhombic flap was drawn incorporating the defect.    The area thus outlined was incised deep to adipose tissue with a #15 scalpel blade.  The skin margins were undermined to an appropriate distance in all directions utilizing iris scissors.
Rhomboid Transposition Flap Text: The defect edges were debeveled with a #15 scalpel blade. Given the location of the defect and the proximity to free margins a rhomboid transposition flap was deemed most appropriate. Using a sterile surgical marker, an appropriate rhomboid flap was drawn incorporating the defect. The area thus outlined was incised deep to adipose tissue with a #15 scalpel blade. The skin margins were undermined to an appropriate distance in all directions utilizing iris scissors. Following this, the designed flap was carried over into the primary defect and sutured into place.
Bi-Rhombic Flap Text: The defect edges were debeveled with a #15 scalpel blade.  Given the location of the defect and the proximity to free margins a bi-rhombic flap was deemed most appropriate.  Using a sterile surgical marker, an appropriate rhombic flap was drawn incorporating the defect. The area thus outlined was incised deep to adipose tissue with a #15 scalpel blade.  The skin margins were undermined to an appropriate distance in all directions utilizing iris scissors.
Helical Rim Advancement Flap Text: The defect edges were debeveled with a #15 blade scalpel.  Given the location of the defect and the proximity to free margins (helical rim) a double helical rim advancement flap was deemed most appropriate.  Using a sterile surgical marker, the appropriate advancement flaps were drawn incorporating the defect and placing the expected incisions between the helical rim and antihelix where possible.  The area thus outlined was incised through and through with a #15 scalpel blade.  With a skin hook and iris scissors, the flaps were gently and sharply undermined and freed up.
Bilateral Helical Rim Advancement Flap Text: The defect edges were debeveled with a #15 blade scalpel.  Given the location of the defect and the proximity to free margins (helical rim) a bilateral helical rim advancement flap was deemed most appropriate.  Using a sterile surgical marker, the appropriate advancement flaps were drawn incorporating the defect and placing the expected incisions between the helical rim and antihelix where possible.  The area thus outlined was incised through and through with a #15 scalpel blade.  With a skin hook and iris scissors, the flaps were gently and sharply undermined and freed up.
Ear Star Wedge Flap Text: The defect edges were debeveled with a #15 blade scalpel.  Given the location of the defect and the proximity to free margins (helical rim) an ear star wedge flap was deemed most appropriate.  Using a sterile surgical marker, the appropriate flap was drawn incorporating the defect and placing the expected incisions between the helical rim and antihelix where possible.  The area thus outlined was incised through and through with a #15 scalpel blade.
Banner Transposition Flap Text: The defect edges were debeveled with a #15 scalpel blade.  Given the location of the defect and the proximity to free margins a Banner transposition flap was deemed most appropriate.  Using a sterile surgical marker, an appropriate flap drawn around the defect. The area thus outlined was incised deep to adipose tissue with a #15 scalpel blade.  The skin margins were undermined to an appropriate distance in all directions utilizing iris scissors.
Bilobed Flap Text: The defect edges were debeveled with a #15 scalpel blade.  Given the location of the defect and the proximity to free margins a bilobe flap was deemed most appropriate.  Using a sterile surgical marker, an appropriate bilobe flap drawn around the defect.    The area thus outlined was incised deep to adipose tissue with a #15 scalpel blade.  The skin margins were undermined to an appropriate distance in all directions utilizing iris scissors.
Bilobed Transposition Flap Text: The defect edges were debeveled with a #15 scalpel blade.  Given the location of the defect and the proximity to free margins a bilobed transposition flap was deemed most appropriate.  Using a sterile surgical marker, an appropriate bilobe flap drawn around the defect.    The area thus outlined was incised deep to adipose tissue with a #15 scalpel blade.  The skin margins were undermined to an appropriate distance in all directions utilizing iris scissors.
Trilobed Flap Text: The defect edges were debeveled with a #15 scalpel blade.  Given the location of the defect and the proximity to free margins a trilobed flap was deemed most appropriate.  Using a sterile surgical marker, an appropriate trilobed flap drawn around the defect.    The area thus outlined was incised deep to adipose tissue with a #15 scalpel blade.  The skin margins were undermined to an appropriate distance in all directions utilizing iris scissors.
Dorsal Nasal Flap Text: The defect edges were debeveled with a #15 scalpel blade.  Given the location of the defect and the proximity to free margins a dorsal nasal flap was deemed most appropriate.  Using a sterile surgical marker, an appropriate dorsal nasal flap was drawn around the defect.    The area thus outlined was incised deep to adipose tissue with a #15 scalpel blade.  The skin margins were undermined to an appropriate distance in all directions utilizing iris scissors.
Island Pedicle Flap Text: The defect edges were debeveled with a #15 scalpel blade.  Given the location of the defect, shape of the defect and the proximity to free margins an island pedicle advancement flap was deemed most appropriate.  Using a sterile surgical marker, an appropriate advancement flap was drawn incorporating the defect, outlining the appropriate donor tissue and placing the expected incisions within the relaxed skin tension lines where possible.    The area thus outlined was incised deep to adipose tissue with a #15 scalpel blade.  The skin margins were undermined to an appropriate distance in all directions around the primary defect and laterally outward around the island pedicle utilizing iris scissors.  There was minimal undermining beneath the pedicle flap.
Island Pedicle Flap With Canthal Suspension Text: The defect edges were debeveled with a #15 scalpel blade.  Given the location of the defect, shape of the defect and the proximity to free margins an island pedicle advancement flap was deemed most appropriate.  Using a sterile surgical marker, an appropriate advancement flap was drawn incorporating the defect, outlining the appropriate donor tissue and placing the expected incisions within the relaxed skin tension lines where possible. The area thus outlined was incised deep to adipose tissue with a #15 scalpel blade.  The skin margins were undermined to an appropriate distance in all directions around the primary defect and laterally outward around the island pedicle utilizing iris scissors.  There was minimal undermining beneath the pedicle flap. A suspension suture was placed in the canthal tendon to prevent tension and prevent ectropion.
Alar Island Pedicle Flap Text: The defect edges were debeveled with a #15 scalpel blade.  Given the location of the defect, shape of the defect and the proximity to the alar rim an island pedicle advancement flap was deemed most appropriate.  Using a sterile surgical marker, an appropriate advancement flap was drawn incorporating the defect, outlining the appropriate donor tissue and placing the expected incisions within the nasal ala running parallel to the alar rim. The area thus outlined was incised with a #15 scalpel blade.  The skin margins were undermined minimally to an appropriate distance in all directions around the primary defect and laterally outward around the island pedicle utilizing iris scissors.  There was minimal undermining beneath the pedicle flap.
Double Island Pedicle Flap Text: The defect edges were debeveled with a #15 scalpel blade.  Given the location of the defect, shape of the defect and the proximity to free margins a double island pedicle advancement flap was deemed most appropriate.  Using a sterile surgical marker, an appropriate advancement flap was drawn incorporating the defect, outlining the appropriate donor tissue and placing the expected incisions within the relaxed skin tension lines where possible.    The area thus outlined was incised deep to adipose tissue with a #15 scalpel blade.  The skin margins were undermined to an appropriate distance in all directions around the primary defect and laterally outward around the island pedicle utilizing iris scissors.  There was minimal undermining beneath the pedicle flap.
Island Pedicle Flap-Requiring Vessel Identification Text: The defect edges were debeveled with a #15 scalpel blade.  Given the location of the defect, shape of the defect and the proximity to free margins an island pedicle advancement flap was deemed most appropriate.  Using a sterile surgical marker, an appropriate advancement flap was drawn, based on the axial vessel mentioned above, incorporating the defect, outlining the appropriate donor tissue and placing the expected incisions within the relaxed skin tension lines where possible.    The area thus outlined was incised deep to adipose tissue with a #15 scalpel blade.  The skin margins were undermined to an appropriate distance in all directions around the primary defect and laterally outward around the island pedicle utilizing iris scissors.  There was minimal undermining beneath the pedicle flap.
Keystone Flap Text: The defect edges were debeveled with a #15 scalpel blade.  Given the location of the defect, shape of the defect a keystone flap was deemed most appropriate.  Using a sterile surgical marker, an appropriate keystone flap was drawn incorporating the defect, outlining the appropriate donor tissue and placing the expected incisions within the relaxed skin tension lines where possible. The area thus outlined was incised deep to adipose tissue with a #15 scalpel blade.  The skin margins were undermined to an appropriate distance in all directions around the primary defect and laterally outward around the flap utilizing iris scissors.
O-T Plasty Text: The defect edges were debeveled with a #15 scalpel blade.  Given the location of the defect, shape of the defect and the proximity to free margins an O-T plasty was deemed most appropriate.  Using a sterile surgical marker, an appropriate O-T plasty was drawn incorporating the defect and placing the expected incisions within the relaxed skin tension lines where possible.    The area thus outlined was incised deep to adipose tissue with a #15 scalpel blade.  The skin margins were undermined to an appropriate distance in all directions utilizing iris scissors.
O-Z Plasty Text: The defect edges were debeveled with a #15 scalpel blade.  Given the location of the defect, shape of the defect and the proximity to free margins an O-Z plasty (double transposition flap) was deemed most appropriate.  Using a sterile surgical marker, the appropriate transposition flaps were drawn incorporating the defect and placing the expected incisions within the relaxed skin tension lines where possible.    The area thus outlined was incised deep to adipose tissue with a #15 scalpel blade.  The skin margins were undermined to an appropriate distance in all directions utilizing iris scissors.  Hemostasis was achieved with electrocautery.  The flaps were then transposed into place, one clockwise and the other counterclockwise, and anchored with interrupted buried subcutaneous sutures.
Double O-Z Plasty Text: The defect edges were debeveled with a #15 scalpel blade. Given the location of the defect, shape of the defect and the proximity to free margins a Double O-Z plasty (double transposition flap) was deemed most appropriate. Using a sterile surgical marker, the appropriate transposition flaps were drawn incorporating the defect and placing the expected incisions within the relaxed skin tension lines where possible. The area thus outlined was incised deep to adipose tissue with a #15 scalpel blade. The skin margins were undermined to an appropriate distance in all directions utilizing iris scissors. Hemostasis was achieved with electrocautery. The flaps were then transposed and carried over into place, one clockwise and the other counterclockwise, and anchored with interrupted buried subcutaneous sutures.
V-Y Plasty Text: The defect edges were debeveled with a #15 scalpel blade.  Given the location of the defect, shape of the defect and the proximity to free margins an V-Y advancement flap was deemed most appropriate.  Using a sterile surgical marker, an appropriate advancement flap was drawn incorporating the defect and placing the expected incisions within the relaxed skin tension lines where possible.    The area thus outlined was incised deep to adipose tissue with a #15 scalpel blade.  The skin margins were undermined to an appropriate distance in all directions utilizing iris scissors.
H Plasty Text: Given the location of the defect, shape of the defect and the proximity to free margins a H-plasty was deemed most appropriate for repair.  Using a sterile surgical marker, the appropriate advancement arms of the H-plasty were drawn incorporating the defect and placing the expected incisions within the relaxed skin tension lines where possible. The area thus outlined was incised deep to adipose tissue with a #15 scalpel blade. The skin margins were undermined to an appropriate distance in all directions utilizing iris scissors.  The opposing advancement arms were then advanced into place in opposite direction and anchored with interrupted buried subcutaneous sutures.
W Plasty Text: The lesion was extirpated to the level of the fat with a #15 scalpel blade.  Given the location of the defect, shape of the defect and the proximity to free margins a W-plasty was deemed most appropriate for repair.  Using a sterile surgical marker, the appropriate transposition arms of the W-plasty were drawn incorporating the defect and placing the expected incisions within the relaxed skin tension lines where possible.    The area thus outlined was incised deep to adipose tissue with a #15 scalpel blade.  The skin margins were undermined to an appropriate distance in all directions utilizing iris scissors.  The opposing transposition arms were then transposed into place in opposite direction and anchored with interrupted buried subcutaneous sutures.
Z Plasty Text: The lesion was extirpated to the level of the fat with a #15 scalpel blade.  Given the location of the defect, shape of the defect and the proximity to free margins a Z-plasty was deemed most appropriate for repair.  Using a sterile surgical marker, the appropriate transposition arms of the Z-plasty were drawn incorporating the defect and placing the expected incisions within the relaxed skin tension lines where possible.    The area thus outlined was incised deep to adipose tissue with a #15 scalpel blade.  The skin margins were undermined to an appropriate distance in all directions utilizing iris scissors.  The opposing transposition arms were then transposed into place in opposite direction and anchored with interrupted buried subcutaneous sutures.
Double Z Plasty Text: The lesion was extirpated to the level of the fat with a #15 scalpel blade. Given the location of the defect, shape of the defect and the proximity to free margins a double Z-plasty was deemed most appropriate for repair. Using a sterile surgical marker, the appropriate transposition arms of the double Z-plasty were drawn incorporating the defect and placing the expected incisions within the relaxed skin tension lines where possible. The area thus outlined was incised deep to adipose tissue with a #15 scalpel blade. The skin margins were undermined to an appropriate distance in all directions utilizing iris scissors. The opposing transposition arms were then transposed and carried over into place in opposite direction and anchored with interrupted buried subcutaneous sutures.
Zygomaticofacial Flap Text: Given the location of the defect, shape of the defect and the proximity to free margins a zygomaticofacial flap was deemed most appropriate for repair. Using a sterile surgical marker, the appropriate flap was drawn incorporating the defect and placing the expected incisions within the relaxed skin tension lines where possible. The area thus outlined was incised deep to adipose tissue with a #15 scalpel blade with preservation of a vascular pedicle.  The skin margins were undermined to an appropriate distance in all directions utilizing iris scissors. The flap was then carried over into the defect and anchored with interrupted buried subcutaneous sutures.
Cheek Interpolation Flap Text: A decision was made to reconstruct the defect utilizing an interpolation axial flap and a staged reconstruction.  A telfa template was made of the defect.  This telfa template was then used to outline the Cheek Interpolation flap.  The donor area for the pedicle flap was then injected with anesthesia.  The flap was excised through the skin and subcutaneous tissue down to the layer of the underlying musculature.  The interpolation flap was carefully excised within this deep plane to maintain its blood supply.  The edges of the donor site were undermined.   The donor site was closed in a primary fashion.  The pedicle was then rotated into position and sutured.  Once the tube was sutured into place, adequate blood supply was confirmed with blanching and refill.  The pedicle was then wrapped with xeroform gauze and dressed appropriately with a telfa and gauze bandage to ensure continued blood supply and protect the attached pedicle.
Cheek-To-Nose Interpolation Flap Text: A decision was made to reconstruct the defect utilizing an interpolation axial flap and a staged reconstruction.  A telfa template was made of the defect.  This telfa template was then used to outline the Cheek-To-Nose Interpolation flap.  The donor area for the pedicle flap was then injected with anesthesia.  The flap was excised through the skin and subcutaneous tissue down to the layer of the underlying musculature.  The interpolation flap was carefully excised within this deep plane to maintain its blood supply.  The edges of the donor site were undermined.   The donor site was closed in a primary fashion.  The pedicle was then rotated into position and sutured.  Once the tube was sutured into place, adequate blood supply was confirmed with blanching and refill.  The pedicle was then wrapped with xeroform gauze and dressed appropriately with a telfa and gauze bandage to ensure continued blood supply and protect the attached pedicle.
Interpolation Flap Text: A decision was made to reconstruct the defect utilizing an interpolation axial flap and a staged reconstruction.  A telfa template was made of the defect.  This telfa template was then used to outline the interpolation flap.  The donor area for the pedicle flap was then injected with anesthesia.  The flap was excised through the skin and subcutaneous tissue down to the layer of the underlying musculature.  The interpolation flap was carefully excised within this deep plane to maintain its blood supply.  The edges of the donor site were undermined.   The donor site was closed in a primary fashion.  The pedicle was then rotated into position and sutured.  Once the tube was sutured into place, adequate blood supply was confirmed with blanching and refill.  The pedicle was then wrapped with xeroform gauze and dressed appropriately with a telfa and gauze bandage to ensure continued blood supply and protect the attached pedicle.
Melolabial Interpolation Flap Text: A decision was made to reconstruct the defect utilizing an interpolation axial flap and a staged reconstruction.  A telfa template was made of the defect.  This telfa template was then used to outline the melolabial interpolation flap.  The donor area for the pedicle flap was then injected with anesthesia.  The flap was excised through the skin and subcutaneous tissue down to the layer of the underlying musculature.  The pedicle flap was carefully excised within this deep plane to maintain its blood supply.  The edges of the donor site were undermined.   The donor site was closed in a primary fashion.  The pedicle was then rotated into position and sutured.  Once the tube was sutured into place, adequate blood supply was confirmed with blanching and refill.  The pedicle was then wrapped with xeroform gauze and dressed appropriately with a telfa and gauze bandage to ensure continued blood supply and protect the attached pedicle.
Mastoid Interpolation Flap Text: A decision was made to reconstruct the defect utilizing an interpolation axial flap and a staged reconstruction.  A telfa template was made of the defect.  This telfa template was then used to outline the mastoid interpolation flap.  The donor area for the pedicle flap was then injected with anesthesia.  The flap was excised through the skin and subcutaneous tissue down to the layer of the underlying musculature.  The pedicle flap was carefully excised within this deep plane to maintain its blood supply.  The edges of the donor site were undermined.   The donor site was closed in a primary fashion.  The pedicle was then rotated into position and sutured.  Once the tube was sutured into place, adequate blood supply was confirmed with blanching and refill.  The pedicle was then wrapped with xeroform gauze and dressed appropriately with a telfa and gauze bandage to ensure continued blood supply and protect the attached pedicle.
Posterior Auricular Interpolation Flap Text: A decision was made to reconstruct the defect utilizing an interpolation axial flap and a staged reconstruction.  A telfa template was made of the defect.  This telfa template was then used to outline the posterior auricular interpolation flap.  The donor area for the pedicle flap was then injected with anesthesia.  The flap was excised through the skin and subcutaneous tissue down to the layer of the underlying musculature.  The pedicle flap was carefully excised within this deep plane to maintain its blood supply.  The edges of the donor site were undermined.   The donor site was closed in a primary fashion.  The pedicle was then rotated into position and sutured.  Once the tube was sutured into place, adequate blood supply was confirmed with blanching and refill.  The pedicle was then wrapped with xeroform gauze and dressed appropriately with a telfa and gauze bandage to ensure continued blood supply and protect the attached pedicle.
Paramedian Forehead Flap Text: A decision was made to reconstruct the defect utilizing an interpolation axial flap and a staged reconstruction.  A telfa template was made of the defect.  This telfa template was then used to outline the paramedian forehead pedicle flap.  The donor area for the pedicle flap was then injected with anesthesia.  The flap was excised through the skin and subcutaneous tissue down to the layer of the underlying musculature.  The pedicle flap was carefully excised within this deep plane to maintain its blood supply.  The edges of the donor site were undermined.   The donor site was closed in a primary fashion.  The pedicle was then rotated into position and sutured.  Once the tube was sutured into place, adequate blood supply was confirmed with blanching and refill.  The pedicle was then wrapped with xeroform gauze and dressed appropriately with a telfa and gauze bandage to ensure continued blood supply and protect the attached pedicle.
Abbe Flap (Upper To Lower Lip) Text: The defect of the lower lip was assessed and measured.  Given the location and size of the defect, an Abbe flap was deemed most appropriate. Using a sterile surgical marker, an appropriate Abbe flap was measured and drawn on the upper lip. Local anesthesia was then infiltrated.  A scalpel was then used to incise the upper lip through and through the skin, vermilion, muscle and mucosa, leaving the flap pedicled on the opposite side.  The flap was then rotated and transferred to the lower lip defect.  The flap was then sutured into place with a three layer technique, closing the orbicularis oris muscle layer with subcutaneous buried sutures, followed by a mucosal layer and an epidermal layer.
Abbe Flap (Lower To Upper Lip) Text: The defect of the upper lip was assessed and measured.  Given the location and size of the defect, an Abbe flap was deemed most appropriate. Using a sterile surgical marker, an appropriate Abbe flap was measured and drawn on the lower lip. Local anesthesia was then infiltrated. A scalpel was then used to incise the upper lip through and through the skin, vermilion, muscle and mucosa, leaving the flap pedicled on the opposite side.  The flap was then rotated and transferred to the lower lip defect.  The flap was then sutured into place with a three layer technique, closing the orbicularis oris muscle layer with subcutaneous buried sutures, followed by a mucosal layer and an epidermal layer.
Estlander Flap (Upper To Lower Lip) Text: The defect of the lower lip was assessed and measured.  Given the location and size of the defect, an Estlander flap was deemed most appropriate. Using a sterile surgical marker, an appropriate Estlander flap was measured and drawn on the upper lip. Local anesthesia was then infiltrated. A scalpel was then used to incise the lateral aspect of the flap, through skin, muscle and mucosa, leaving the flap pedicled medially.  The flap was then rotated and positioned to fill the lower lip defect.  The flap was then sutured into place with a three layer technique, closing the orbicularis oris muscle layer with subcutaneous buried sutures, followed by a mucosal layer and an epidermal layer.
Lip Wedge Excision Repair Text: Given the location of the defect and the proximity to free margins a full thickness wedge repair was deemed most appropriate.  Using a sterile surgical marker, the appropriate repair was drawn incorporating the defect and placing the expected incisions perpendicular to the vermilion border.  The vermilion border was also meticulously outlined to ensure appropriate reapproximation during the repair.  The area thus outlined was incised through and through with a #15 scalpel blade.  The muscularis and dermis were reaproximated with deep sutures following hemostasis. Care was taken to realign the vermilion border before proceeding with the superficial closure.  Once the vermilion was realigned the superfical and mucosal closure was finished.
Ftsg Text: The defect edges were debeveled with a #15 scalpel blade.  Given the location of the defect, shape of the defect and the proximity to free margins a full thickness skin graft was deemed most appropriate.  Using a sterile surgical marker, the primary defect shape was transferred to the donor site. The area thus outlined was incised deep to adipose tissue with a #15 scalpel blade.  The harvested graft was then trimmed of adipose tissue until only dermis and epidermis was left.  The skin margins of the secondary defect were undermined to an appropriate distance in all directions utilizing iris scissors.  The secondary defect was closed with interrupted buried subcutaneous sutures.  The skin edges were then re-apposed with running  sutures.  The skin graft was then placed in the primary defect and oriented appropriately.
Split-Thickness Skin Graft Text: The defect edges were debeveled with a #15 scalpel blade.  Given the location of the defect, shape of the defect and the proximity to free margins a split thickness skin graft was deemed most appropriate.  Using a sterile surgical marker, the primary defect shape was transferred to the donor site. The split thickness graft was then harvested.  The skin graft was then placed in the primary defect and oriented appropriately.
Pinch Graft Text: The defect edges were debeveled with a #15 scalpel blade. Given the location of the defect, shape of the defect and the proximity to free margins a pinch graft was deemed most appropriate. Using a sterile surgical marker, the primary defect shape was transferred to the donor site. The area thus outlined was incised deep to adipose tissue with a #15 scalpel blade.  The harvested graft was then trimmed of adipose tissue until only dermis and epidermis was left. The skin margins of the secondary defect were undermined to an appropriate distance in all directions utilizing iris scissors.  The secondary defect was closed with interrupted buried subcutaneous sutures.  The skin edges were then re-apposed with running  sutures.  The skin graft was then placed in the primary defect and oriented appropriately.
Burow's Graft Text: The defect edges were debeveled with a #15 scalpel blade. Given the location of the defect, shape of the defect, the proximity to free margins and the presence of a standing cone deformity a Burow's skin graft was deemed most appropriate. The standing cone was removed and this tissue was then trimmed to the shape of the primary defect. The adipose tissue was also removed until only dermis and epidermis were left.  The skin margins of the secondary defect were undermined to an appropriate distance in all directions utilizing iris scissors.  The secondary defect was closed with interrupted buried subcutaneous sutures.  The skin edges were then re-apposed with running  sutures.  The skin graft was then placed in the primary defect and oriented appropriately.
Cartilage Graft Text: The defect edges were debeveled with a #15 scalpel blade.  Given the location of the defect, shape of the defect, the fact the defect involved a full thickness cartilage defect a cartilage graft was deemed most appropriate.  An appropriate donor site was identified, cleansed, and anesthetized. The cartilage graft was then harvested and transferred to the recipient site, oriented appropriately and then sutured into place.  The secondary defect was then repaired using a primary closure.
Composite Graft Text: The defect edges were debeveled with a #15 scalpel blade.  Given the location of the defect, shape of the defect, the proximity to free margins and the fact the defect was full thickness a composite graft was deemed most appropriate.  The defect was outline and then transferred to the donor site.  A full thickness graft was then excised from the donor site. The graft was then placed in the primary defect, oriented appropriately and then sutured into place.  The secondary defect was then repaired using a primary closure.
Epidermal Autograft Text: The defect edges were debeveled with a #15 scalpel blade.  Given the location of the defect, shape of the defect and the proximity to free margins an epidermal autograft was deemed most appropriate.  Using a sterile surgical marker, the primary defect shape was transferred to the donor site. The epidermal graft was then harvested.  The skin graft was then placed in the primary defect and oriented appropriately.
Dermal Autograft Text: The defect edges were debeveled with a #15 scalpel blade.  Given the location of the defect, shape of the defect and the proximity to free margins a dermal autograft was deemed most appropriate.  Using a sterile surgical marker, the primary defect shape was transferred to the donor site. The area thus outlined was incised deep to adipose tissue with a #15 scalpel blade.  The harvested graft was then trimmed of adipose and epidermal tissue until only dermis was left.  The skin graft was then placed in the primary defect and oriented appropriately.
Skin Substitute Text: The defect edges were debeveled with a #15 scalpel blade.  Given the location of the defect, shape of the defect and the proximity to free margins a skin substitute graft was deemed most appropriate.  The graft material was trimmed to fit the size of the defect. The graft was then placed in the primary defect and oriented appropriately.
Tissue Cultured Epidermal Autograft Text: The defect edges were debeveled with a #15 scalpel blade.  Given the location of the defect, shape of the defect and the proximity to free margins a tissue cultured epidermal autograft was deemed most appropriate.  The graft was then trimmed to fit the size of the defect.  The graft was then placed in the primary defect and oriented appropriately.
Xenograft Text: The defect edges were debeveled with a #15 scalpel blade.  Given the location of the defect, shape of the defect and the proximity to free margins a xenograft was deemed most appropriate.  The graft was then trimmed to fit the size of the defect.  The graft was then placed in the primary defect and oriented appropriately.
Purse String (Intermediate) Text: Given the location of the defect and the characteristics of the surrounding skin a purse string intermediate closure was deemed most appropriate.  Undermining was performed circumfirentially around the surgical defect.  A purse string suture was then placed and tightened.
Purse String (Simple) Text: Given the location of the defect and the characteristics of the surrounding skin a purse string simple closure was deemed most appropriate.  Undermining was performed circumferentially around the surgical defect.  A purse string suture was then placed and tightened.
Partial Purse String (Intermediate) Text: Given the location of the defect and the characteristics of the surrounding skin an intermediate purse string closure was deemed most appropriate.  Undermining was performed circumferentially around the surgical defect.  A purse string suture was then placed and tightened. Wound tension of the circular defect prevented complete closure of the wound.
Partial Purse String (Simple) Text: Given the location of the defect and the characteristics of the surrounding skin a simple purse string closure was deemed most appropriate.  Undermining was performed circumferentially around the surgical defect.  A purse string suture was then placed and tightened. Wound tension of the circular defect prevented complete closure of the wound.
Complex Repair And Single Advancement Flap Text: The defect edges were debeveled with a #15 scalpel blade.  The primary defect was closed partially with a complex linear closure.  Given the location of the remaining defect, shape of the defect and the proximity to free margins a single advancement flap was deemed most appropriate for complete closure of the defect.  Using a sterile surgical marker, an appropriate advancement flap was drawn incorporating the defect and placing the expected incisions within the relaxed skin tension lines where possible.    The area thus outlined was incised deep to adipose tissue with a #15 scalpel blade.  The skin margins were undermined to an appropriate distance in all directions utilizing iris scissors.
Complex Repair And Double Advancement Flap Text: The defect edges were debeveled with a #15 scalpel blade.  The primary defect was closed partially with a complex linear closure.  Given the location of the remaining defect, shape of the defect and the proximity to free margins a double advancement flap was deemed most appropriate for complete closure of the defect.  Using a sterile surgical marker, an appropriate advancement flap was drawn incorporating the defect and placing the expected incisions within the relaxed skin tension lines where possible.    The area thus outlined was incised deep to adipose tissue with a #15 scalpel blade.  The skin margins were undermined to an appropriate distance in all directions utilizing iris scissors.
Complex Repair And Modified Advancement Flap Text: The defect edges were debeveled with a #15 scalpel blade.  The primary defect was closed partially with a complex linear closure.  Given the location of the remaining defect, shape of the defect and the proximity to free margins a modified advancement flap was deemed most appropriate for complete closure of the defect.  Using a sterile surgical marker, an appropriate advancement flap was drawn incorporating the defect and placing the expected incisions within the relaxed skin tension lines where possible.    The area thus outlined was incised deep to adipose tissue with a #15 scalpel blade.  The skin margins were undermined to an appropriate distance in all directions utilizing iris scissors.
Complex Repair And A-T Advancement Flap Text: The defect edges were debeveled with a #15 scalpel blade.  The primary defect was closed partially with a complex linear closure.  Given the location of the remaining defect, shape of the defect and the proximity to free margins an A-T advancement flap was deemed most appropriate for complete closure of the defect.  Using a sterile surgical marker, an appropriate advancement flap was drawn incorporating the defect and placing the expected incisions within the relaxed skin tension lines where possible.    The area thus outlined was incised deep to adipose tissue with a #15 scalpel blade.  The skin margins were undermined to an appropriate distance in all directions utilizing iris scissors.
Complex Repair And O-T Advancement Flap Text: The defect edges were debeveled with a #15 scalpel blade.  The primary defect was closed partially with a complex linear closure.  Given the location of the remaining defect, shape of the defect and the proximity to free margins an O-T advancement flap was deemed most appropriate for complete closure of the defect.  Using a sterile surgical marker, an appropriate advancement flap was drawn incorporating the defect and placing the expected incisions within the relaxed skin tension lines where possible.    The area thus outlined was incised deep to adipose tissue with a #15 scalpel blade.  The skin margins were undermined to an appropriate distance in all directions utilizing iris scissors.
Complex Repair And O-L Flap Text: The defect edges were debeveled with a #15 scalpel blade.  The primary defect was closed partially with a complex linear closure.  Given the location of the remaining defect, shape of the defect and the proximity to free margins an O-L flap was deemed most appropriate for complete closure of the defect.  Using a sterile surgical marker, an appropriate flap was drawn incorporating the defect and placing the expected incisions within the relaxed skin tension lines where possible.    The area thus outlined was incised deep to adipose tissue with a #15 scalpel blade.  The skin margins were undermined to an appropriate distance in all directions utilizing iris scissors.
Complex Repair And Bilobe Flap Text: The defect edges were debeveled with a #15 scalpel blade.  The primary defect was closed partially with a complex linear closure.  Given the location of the remaining defect, shape of the defect and the proximity to free margins a bilobe flap was deemed most appropriate for complete closure of the defect.  Using a sterile surgical marker, an appropriate advancement flap was drawn incorporating the defect and placing the expected incisions within the relaxed skin tension lines where possible.    The area thus outlined was incised deep to adipose tissue with a #15 scalpel blade.  The skin margins were undermined to an appropriate distance in all directions utilizing iris scissors.
Complex Repair And Melolabial Flap Text: The defect edges were debeveled with a #15 scalpel blade.  The primary defect was closed partially with a complex linear closure.  Given the location of the remaining defect, shape of the defect and the proximity to free margins a melolabial flap was deemed most appropriate for complete closure of the defect.  Using a sterile surgical marker, an appropriate advancement flap was drawn incorporating the defect and placing the expected incisions within the relaxed skin tension lines where possible.    The area thus outlined was incised deep to adipose tissue with a #15 scalpel blade.  The skin margins were undermined to an appropriate distance in all directions utilizing iris scissors.
Complex Repair And Rotation Flap Text: The defect edges were debeveled with a #15 scalpel blade.  The primary defect was closed partially with a complex linear closure.  Given the location of the remaining defect, shape of the defect and the proximity to free margins a rotation flap was deemed most appropriate for complete closure of the defect.  Using a sterile surgical marker, an appropriate advancement flap was drawn incorporating the defect and placing the expected incisions within the relaxed skin tension lines where possible.    The area thus outlined was incised deep to adipose tissue with a #15 scalpel blade.  The skin margins were undermined to an appropriate distance in all directions utilizing iris scissors.
Complex Repair And Rhombic Flap Text: The defect edges were debeveled with a #15 scalpel blade.  The primary defect was closed partially with a complex linear closure.  Given the location of the remaining defect, shape of the defect and the proximity to free margins a rhombic flap was deemed most appropriate for complete closure of the defect.  Using a sterile surgical marker, an appropriate advancement flap was drawn incorporating the defect and placing the expected incisions within the relaxed skin tension lines where possible.    The area thus outlined was incised deep to adipose tissue with a #15 scalpel blade.  The skin margins were undermined to an appropriate distance in all directions utilizing iris scissors.
Complex Repair And Transposition Flap Text: The defect edges were debeveled with a #15 scalpel blade.  The primary defect was closed partially with a complex linear closure.  Given the location of the remaining defect, shape of the defect and the proximity to free margins a transposition flap was deemed most appropriate for complete closure of the defect.  Using a sterile surgical marker, an appropriate advancement flap was drawn incorporating the defect and placing the expected incisions within the relaxed skin tension lines where possible.    The area thus outlined was incised deep to adipose tissue with a #15 scalpel blade.  The skin margins were undermined to an appropriate distance in all directions utilizing iris scissors.
Complex Repair And V-Y Plasty Text: The defect edges were debeveled with a #15 scalpel blade.  The primary defect was closed partially with a complex linear closure.  Given the location of the remaining defect, shape of the defect and the proximity to free margins a V-Y plasty was deemed most appropriate for complete closure of the defect.  Using a sterile surgical marker, an appropriate advancement flap was drawn incorporating the defect and placing the expected incisions within the relaxed skin tension lines where possible.    The area thus outlined was incised deep to adipose tissue with a #15 scalpel blade.  The skin margins were undermined to an appropriate distance in all directions utilizing iris scissors.
Complex Repair And M Plasty Text: The defect edges were debeveled with a #15 scalpel blade.  The primary defect was closed partially with a complex linear closure.  Given the location of the remaining defect, shape of the defect and the proximity to free margins an M plasty was deemed most appropriate for complete closure of the defect.  Using a sterile surgical marker, an appropriate advancement flap was drawn incorporating the defect and placing the expected incisions within the relaxed skin tension lines where possible.    The area thus outlined was incised deep to adipose tissue with a #15 scalpel blade.  The skin margins were undermined to an appropriate distance in all directions utilizing iris scissors.
Complex Repair And Double M Plasty Text: The defect edges were debeveled with a #15 scalpel blade.  The primary defect was closed partially with a complex linear closure.  Given the location of the remaining defect, shape of the defect and the proximity to free margins a double M plasty was deemed most appropriate for complete closure of the defect.  Using a sterile surgical marker, an appropriate advancement flap was drawn incorporating the defect and placing the expected incisions within the relaxed skin tension lines where possible.    The area thus outlined was incised deep to adipose tissue with a #15 scalpel blade.  The skin margins were undermined to an appropriate distance in all directions utilizing iris scissors.
Complex Repair And W Plasty Text: The defect edges were debeveled with a #15 scalpel blade.  The primary defect was closed partially with a complex linear closure.  Given the location of the remaining defect, shape of the defect and the proximity to free margins a W plasty was deemed most appropriate for complete closure of the defect.  Using a sterile surgical marker, an appropriate advancement flap was drawn incorporating the defect and placing the expected incisions within the relaxed skin tension lines where possible.    The area thus outlined was incised deep to adipose tissue with a #15 scalpel blade.  The skin margins were undermined to an appropriate distance in all directions utilizing iris scissors.
Complex Repair And Z Plasty Text: The defect edges were debeveled with a #15 scalpel blade.  The primary defect was closed partially with a complex linear closure.  Given the location of the remaining defect, shape of the defect and the proximity to free margins a Z plasty was deemed most appropriate for complete closure of the defect.  Using a sterile surgical marker, an appropriate advancement flap was drawn incorporating the defect and placing the expected incisions within the relaxed skin tension lines where possible.    The area thus outlined was incised deep to adipose tissue with a #15 scalpel blade.  The skin margins were undermined to an appropriate distance in all directions utilizing iris scissors.
Complex Repair And Dorsal Nasal Flap Text: The defect edges were debeveled with a #15 scalpel blade.  The primary defect was closed partially with a complex linear closure.  Given the location of the remaining defect, shape of the defect and the proximity to free margins a dorsal nasal flap was deemed most appropriate for complete closure of the defect.  Using a sterile surgical marker, an appropriate flap was drawn incorporating the defect and placing the expected incisions within the relaxed skin tension lines where possible.    The area thus outlined was incised deep to adipose tissue with a #15 scalpel blade.  The skin margins were undermined to an appropriate distance in all directions utilizing iris scissors.
Complex Repair And Ftsg Text: The defect edges were debeveled with a #15 scalpel blade.  The primary defect was closed partially with a complex linear closure.  Given the location of the defect, shape of the defect and the proximity to free margins a full thickness skin graft was deemed most appropriate to repair the remaining defect.  The graft was trimmed to fit the size of the remaining defect.  The graft was then placed in the primary defect, oriented appropriately, and sutured into place.
Complex Repair And Burow's Graft Text: The defect edges were debeveled with a #15 scalpel blade.  The primary defect was closed partially with a complex linear closure.  Given the location of the defect, shape of the defect, the proximity to free margins and the presence of a standing cone deformity a Burow's graft was deemed most appropriate to repair the remaining defect.  The graft was trimmed to fit the size of the remaining defect.  The graft was then placed in the primary defect, oriented appropriately, and sutured into place.
Complex Repair And Split-Thickness Skin Graft Text: The defect edges were debeveled with a #15 scalpel blade.  The primary defect was closed partially with a complex linear closure.  Given the location of the defect, shape of the defect and the proximity to free margins a split thickness skin graft was deemed most appropriate to repair the remaining defect.  The graft was trimmed to fit the size of the remaining defect.  The graft was then placed in the primary defect, oriented appropriately, and sutured into place.
Complex Repair And Epidermal Autograft Text: The defect edges were debeveled with a #15 scalpel blade.  The primary defect was closed partially with a complex linear closure.  Given the location of the defect, shape of the defect and the proximity to free margins an epidermal autograft was deemed most appropriate to repair the remaining defect.  The graft was trimmed to fit the size of the remaining defect.  The graft was then placed in the primary defect, oriented appropriately, and sutured into place.
Complex Repair And Dermal Autograft Text: The defect edges were debeveled with a #15 scalpel blade.  The primary defect was closed partially with a complex linear closure.  Given the location of the defect, shape of the defect and the proximity to free margins an dermal autograft was deemed most appropriate to repair the remaining defect.  The graft was trimmed to fit the size of the remaining defect.  The graft was then placed in the primary defect, oriented appropriately, and sutured into place.
Complex Repair And Tissue Cultured Epidermal Autograft Text: The defect edges were debeveled with a #15 scalpel blade.  The primary defect was closed partially with a complex linear closure.  Given the location of the defect, shape of the defect and the proximity to free margins an tissue cultured epidermal autograft was deemed most appropriate to repair the remaining defect.  The graft was trimmed to fit the size of the remaining defect.  The graft was then placed in the primary defect, oriented appropriately, and sutured into place.
Complex Repair And Xenograft Text: The defect edges were debeveled with a #15 scalpel blade.  The primary defect was closed partially with a complex linear closure.  Given the location of the defect, shape of the defect and the proximity to free margins a xenograft was deemed most appropriate to repair the remaining defect.  The graft was trimmed to fit the size of the remaining defect.  The graft was then placed in the primary defect, oriented appropriately, and sutured into place.
Complex Repair And Skin Substitute Graft Text: The defect edges were debeveled with a #15 scalpel blade.  The primary defect was closed partially with a complex linear closure.  Given the location of the remaining defect, shape of the defect and the proximity to free margins a skin substitute graft was deemed most appropriate to repair the remaining defect.  The graft was trimmed to fit the size of the remaining defect.  The graft was then placed in the primary defect, oriented appropriately, and sutured into place.
Path Notes (To The Dermatopathologist): Please check margins. Nicked  at 6 o’clock
Consent was obtained from the patient. The risks and benefits to therapy were discussed in detail. Specifically, the risks of infection, scarring, bleeding, prolonged wound healing, incomplete removal, allergy to anesthesia, nerve injury and recurrence were addressed. Prior to the procedure, the treatment site was clearly identified and confirmed by the patient. All components of Universal Protocol/PAUSE Rule completed.
Post-Care Instructions: I reviewed with the patient in detail post-care instructions. Patient is not to engage in any heavy lifting, exercise, or swimming for the next 14 days. Should the patient develop any fevers, chills, bleeding, severe pain patient will contact the office immediately.
Home Suture Removal Text: Patient was provided a home suture removal kit and will remove their sutures at home.  If they have any questions or difficulties they will call the office.
Where Do You Want The Question To Include Opioid Counseling Located?: Case Summary Tab
Information: Selecting Yes will display possible errors in your note based on the variables you have selected. This validation is only offered as a suggestion for you. PLEASE NOTE THAT THE VALIDATION TEXT WILL BE REMOVED WHEN YOU FINALIZE YOUR NOTE. IF YOU WANT TO FAX A PRELIMINARY NOTE YOU WILL NEED TO TOGGLE THIS TO 'NO' IF YOU DO NOT WANT IT IN YOUR FAXED NOTE.

## 2024-04-25 ENCOUNTER — APPOINTMENT (RX ONLY)
Dept: URBAN - METROPOLITAN AREA CLINIC 150 | Facility: CLINIC | Age: 79
Setting detail: DERMATOLOGY
End: 2024-04-25

## 2024-04-25 DIAGNOSIS — Z48.02 ENCOUNTER FOR REMOVAL OF SUTURES: ICD-10-CM

## 2024-04-25 PROCEDURE — ? OTHER

## 2024-04-25 PROCEDURE — ? PHOTO-DOCUMENTATION

## 2024-04-25 PROCEDURE — 99024 POSTOP FOLLOW-UP VISIT: CPT

## 2024-04-25 PROCEDURE — ? SUTURE REMOVAL (GLOBAL PERIOD)

## 2024-04-25 ASSESSMENT — LOCATION DETAILED DESCRIPTION DERM: LOCATION DETAILED: RIGHT DISTAL DORSAL FOREARM

## 2024-04-25 ASSESSMENT — LOCATION SIMPLE DESCRIPTION DERM: LOCATION SIMPLE: RIGHT FOREARM

## 2024-04-25 ASSESSMENT — LOCATION ZONE DERM: LOCATION ZONE: ARM

## 2024-04-25 NOTE — PROCEDURE: OTHER
Other (Free Text): No provider needed at this time. Sutures were removed by Sammie Smith MA
Render Risk Assessment In Note?: no
Note Text (......Xxx Chief Complaint.): This diagnosis correlates with the
Detail Level: Zone

## 2024-04-25 NOTE — PROCEDURE: SUTURE REMOVAL (GLOBAL PERIOD)
Detail Level: Detailed
Add 70277 Cpt? (Important Note: In 2017 The Use Of 94817 Is Being Tracked By Cms To Determine Future Global Period Reimbursement For Global Periods): yes

## 2024-06-13 ENCOUNTER — OFFICE VISIT (OUTPATIENT)
Dept: FAMILY MEDICINE CLINIC | Facility: CLINIC | Age: 79
End: 2024-06-13
Payer: MEDICARE

## 2024-06-13 VITALS
RESPIRATION RATE: 16 BRPM | HEART RATE: 82 BPM | TEMPERATURE: 98 F | WEIGHT: 172 LBS | SYSTOLIC BLOOD PRESSURE: 140 MMHG | OXYGEN SATURATION: 99 % | HEIGHT: 69 IN | DIASTOLIC BLOOD PRESSURE: 80 MMHG | BODY MASS INDEX: 25.48 KG/M2

## 2024-06-13 DIAGNOSIS — J01.90 ACUTE NON-RECURRENT SINUSITIS, UNSPECIFIED LOCATION: Primary | ICD-10-CM

## 2024-06-13 PROCEDURE — 99213 OFFICE O/P EST LOW 20 MIN: CPT | Performed by: NURSE PRACTITIONER

## 2024-06-13 RX ORDER — AMOXICILLIN AND CLAVULANATE POTASSIUM 875; 125 MG/1; MG/1
1 TABLET, FILM COATED ORAL 2 TIMES DAILY
Qty: 14 TABLET | Refills: 0 | Status: SHIPPED | OUTPATIENT
Start: 2024-06-13 | End: 2024-06-20

## 2024-06-13 NOTE — PROGRESS NOTES
CHIEF COMPLAINT:     Chief Complaint   Patient presents with    Cold     Sx onset 10 days  Cough when laying down, chest congestion  Denies fever, runny nose   OTC cold medicine        HPI:   James Roberts is a 78 year old male who presents for sinus congestion for  10  days. Symptoms have been worsening since onset. Sinus congestion/pain is described as a pressure and is located mainly post nasal drainage.  Reports nasal discharge. Has treated symptoms with cold medication. Patient also reports congestion worse at night. Denies fever, chills, dental pain, tinnitus, N/V/D.        Current Outpatient Medications   Medication Sig Dispense Refill    amoxicillin clavulanate 875-125 MG Oral Tab Take 1 tablet by mouth 2 (two) times daily for 7 days. 14 tablet 0    levothyroxine 125 MCG Oral Tab Take 1 tablet (125 mcg total) by mouth before breakfast.  0    docusate sodium 100 MG Oral Cap Take 100 mg by mouth one time.      AmLODIPine Besylate 5 MG Oral Tab Take 5 mg by mouth daily.      Hypromellose (ARTIFICIAL TEARS OP) Apply to eye.      Sildenafil Citrate 100 MG Oral Tab Take 100 mg by mouth daily as needed for Erectile Dysfunction.      triamcinolone acetonide 0.025 % External Cream Apply topically 2 (two) times daily.      aspirin 81 MG Oral Tab Take 81 mg by mouth every other day. Pt reports taking every other day. MD prescribed daily.      Atorvastatin Calcium 10 MG Oral Tab Take 10 mg by mouth nightly.      atenolol (TENORMIN) 25 MG Oral Tab Take 25 mg by mouth daily.      Bioflavonoid Products (DESIRAE C OR) Take 500 mg by mouth daily.      Multiple Vitamins-Minerals (MULTIVITAMIN OR) Take by mouth daily.      pantoprazole 40 MG Oral Tab EC Take 40 mg by mouth 2 (two) times daily.      Cyanocobalamin (VITAMIN B 12 OR) Take by mouth every other day.      Ergocalciferol (VITAMIN D OR) Take by mouth every other day.        Past Medical History:    Chronic coronary artery disease    Dysfunction of eustachian tube     Gastroesophageal reflux disease    Hyperlipidemia    Hypertension    Hypogonadism in male    Description: Per Dr. Byrne     Hypothyroidism    Non-Hodgkin's lymphoma (HCC)    Osteoarthritis      Past Surgical History:   Procedure Laterality Date    Colectomy      Hernia surgery      Tonsillectomy        Family History   Problem Relation Age of Onset    Alcohol and Other Disorders Associated Other     High Blood Pressure Other     Diabetes Other     Heart Disease Other       Social History     Socioeconomic History    Marital status:    Tobacco Use    Smoking status: Former     Current packs/day: 0.00     Types: Cigarettes     Quit date: 1988     Years since quittin.4    Smokeless tobacco: Never   Substance and Sexual Activity    Alcohol use: Yes     Alcohol/week: 0.0 standard drinks of alcohol     Comment: social    Drug use: No         REVIEW OF SYSTEMS:   GENERAL: feels well otherwise, no unplanned weight change,  good appetite  SKIN: no rashes or abnormal skin lesions  HEENT: See HPI.    LUNGS: denies shortness of breath or wheezing, See HPI  CARDIOVASCULAR: denies chest pain or palpitations   GI: denies N/V/C or abdominal pain  NEURO:No numbness or tingling in face.    EXAM:   /80   Pulse 82   Temp 97.8 °F (36.6 °C)   Resp 16   Ht 5' 9\" (1.753 m)   Wt 172 lb (78 kg)   SpO2 99%   BMI 25.40 kg/m²   GENERAL: well developed, well nourished,in no apparent distress  SKIN: no rashes,no suspicious lesions  HEAD: atraumatic, normocephalic,  no tenderness on palpation of sinuses  EYES: conjunctiva clear, EOM intact  EARS: TM's clear gray, no bulging, no retraction, + fluid, bony landmarks intact  NOSE: nostrils patent, clear nasal mucous, nasal mucosa reddened and swollen  THROAT: oral mucosa pink, moist. No visible dental caries. Posterior pharynx is mild erythematous. no exudates.  NECK: supple, non-tender  LUNGS: clear to auscultation bilaterally, no wheezes or rhonchi, no diminished breath  sounds. Breathing is non labored.  CARDIO: RRR without murmur  EXTREMITIES: no cyanosis, clubbing or edema  LYMPH:  bilateral anterior cervical lymphadenopathy.   NEURO: No focal deficits       ASSESSMENT AND PLAN:   ASSESSMENT:  James Roberts is a 78 year old male who presents with    ASSESSMENT:   Encounter Diagnosis   Name Primary?    Acute non-recurrent sinusitis, unspecified location Yes     1. Acute non-recurrent sinusitis, unspecified location  - amoxicillin clavulanate 875-125 MG Oral Tab; Take 1 tablet by mouth 2 (two) times daily for 7 days.  Dispense: 14 tablet; Refill: 0  Add flonase and saline rinses    PLAN: Meds and instructions as below.  Comfort care instructions as listed in Patient Instructions.  To f/u with PCP if no improvement in 3-5 days or sooner if sx worsen.    Meds & Refills for this Visit:  Requested Prescriptions     Signed Prescriptions Disp Refills    amoxicillin clavulanate 875-125 MG Oral Tab 14 tablet 0     Sig: Take 1 tablet by mouth 2 (two) times daily for 7 days.       Risks, benefits, side effects of medication addressed and explained.  See pt handout    The patient indicates understanding of these issues and agrees to the plan.

## 2024-06-18 ENCOUNTER — APPOINTMENT (RX ONLY)
Dept: URBAN - METROPOLITAN AREA CLINIC 151 | Facility: CLINIC | Age: 79
Setting detail: DERMATOLOGY
End: 2024-06-18

## 2024-06-18 DIAGNOSIS — L81.4 OTHER MELANIN HYPERPIGMENTATION: ICD-10-CM

## 2024-06-18 DIAGNOSIS — D22 MELANOCYTIC NEVI: ICD-10-CM

## 2024-06-18 DIAGNOSIS — L57.0 ACTINIC KERATOSIS: ICD-10-CM | Status: INADEQUATELY CONTROLLED

## 2024-06-18 DIAGNOSIS — L82.1 OTHER SEBORRHEIC KERATOSIS: ICD-10-CM

## 2024-06-18 DIAGNOSIS — Z85.820 PERSONAL HISTORY OF MALIGNANT MELANOMA OF SKIN: ICD-10-CM

## 2024-06-18 PROBLEM — D48.5 NEOPLASM OF UNCERTAIN BEHAVIOR OF SKIN: Status: ACTIVE | Noted: 2024-06-18

## 2024-06-18 PROBLEM — D22.5 MELANOCYTIC NEVI OF TRUNK: Status: ACTIVE | Noted: 2024-06-18

## 2024-06-18 PROCEDURE — 99213 OFFICE O/P EST LOW 20 MIN: CPT | Mod: 25

## 2024-06-18 PROCEDURE — ? ADDITIONAL NOTES

## 2024-06-18 PROCEDURE — ? BIOPSY BY SHAVE METHOD

## 2024-06-18 PROCEDURE — ? PRESCRIPTION MEDICATION MANAGEMENT

## 2024-06-18 PROCEDURE — ? FULL BODY SKIN EXAM

## 2024-06-18 PROCEDURE — 11102 TANGNTL BX SKIN SINGLE LES: CPT

## 2024-06-18 PROCEDURE — ? COUNSELING

## 2024-06-18 PROCEDURE — 11103 TANGNTL BX SKIN EA SEP/ADDL: CPT

## 2024-06-18 PROCEDURE — ? TREATMENT REGIMEN

## 2024-06-18 ASSESSMENT — LOCATION SIMPLE DESCRIPTION DERM
LOCATION SIMPLE: LEFT UPPER BACK
LOCATION SIMPLE: POSTERIOR SCALP

## 2024-06-18 ASSESSMENT — LOCATION ZONE DERM
LOCATION ZONE: SCALP
LOCATION ZONE: TRUNK

## 2024-06-18 ASSESSMENT — LOCATION DETAILED DESCRIPTION DERM
LOCATION DETAILED: LEFT INFERIOR MEDIAL UPPER BACK
LOCATION DETAILED: POSTERIOR MID-PARIETAL SCALP
LOCATION DETAILED: LEFT SUPERIOR MEDIAL UPPER BACK
LOCATION DETAILED: LEFT MEDIAL UPPER BACK

## 2024-06-18 NOTE — PROCEDURE: PRESCRIPTION MEDICATION MANAGEMENT
Detail Level: Zone
Initiate Treatment: [place] then  Hydrocortisone in the morning
Render In Strict Bullet Format?: No

## 2024-06-18 NOTE — PROCEDURE: FULL BODY SKIN EXAM
Maternal Fetal Medicine Follow Up    Subjective     Patient ID: [id number]    Chief Complaint: m followup w/us      HPI: Carmen Shabazz is a 25 y.o. female  at 27w6d gestation with Estimated Date of Delivery: 3/21/24  who is here for follow  up consultation by [facility].  She has history of severe preeclampsia requiring delivery at 25 weeks via emergency .  Baby was born alive and she reports baby passed away in the NICU 10 days later.  Baby also had severe FGR, weighing 14 oz at 25 weeks. She had an initialy elevated BP reading at consult visit at 18 weeks, suspected underlying hypertension.  Out of caution, preeclampsia labs were ordered. On 2023, preeclampsia labs were reassuring with platelets 301, uric acid 3.0, creatinine 0.48, AST 11, ALT 9, , 24 hour urine with 78 mg protein.  Since our last visit, she had 2 additional mildly elevated blood pressure readings with primary OB, consistent with chronic hypertension.  She has increased BMI 30.7 at consult visit.  She is currently on prophylactic low-dose aspirin twice daily.     Patient was accompanied by her mother         Interval history since last [facility] visit: None.. She denies any leaking fluid, vaginal bleeding, contractions, decreased fetal movement. Denies headaches, visual disturbances, or epigastric pain.    Pregnancy complications include:   Patient Active Problem List   Diagnosis    Hx of severe preeclampsia with fetal growth restriction requiring delivery at 25 weeks with  demise, prior pregnancy, currently pregnant, second trimester    At high risk for complications of intrauterine pregnancy (IUP)    Increased BMI affecting pregnancy in second trimester    Pre-existing essential hypertension complicating pregnancy in second trimester    Excessive weight gain in pregnancy in second trimester        No changes to medical, surgical, family, social, or obstetric history.    Medications:  Current Outpatient Medications 
"  Medication Instructions    aspirin (ECOTRIN) 81 mg, Oral, Daily    aspirin 81 mg, Oral    prenatal vit/iron fum/folic ac (PRENATAL 1+1 ORAL) Oral    prenatal vit/iron fum/folic ac (PRENATAL 1+1 ORAL) Oral       Review of Systems   12 point review of systems conducted, negative except as stated in the history of present illness. See HPI for details.      Objective     Visit Vitals  BP (!) 126/91 (BP Location: Right arm, Patient Position: Sitting, BP Method: Large (Automatic))   Pulse 105   Ht 4' 9" (1.448 m)   Wt 69.4 kg (153 lb)   BMI 33.11 kg/m²          Physical Exam  Vitals and nursing note reviewed.   Constitutional:       Appearance: Normal appearance.      Comments: Increased BMI   HENT:      Head: Normocephalic and atraumatic.      Nose: Nose normal. No congestion.   Pulmonary:      Effort: Pulmonary effort is normal.   Skin:     Findings: No rash.   Neurological:      Mental Status: She is alert and oriented to person, place, and time.   Psychiatric:         Mood and Affect: Mood normal.         Behavior: Behavior normal.         Thought Content: Thought content normal.         Judgment: Judgment normal.           ASSESSMENT/PLAN:     25 y.o.  female with IUP at 27w6d    Hsitory of severe preeclampsia and fetal growth restriction requiring delivery at 25 weeks with  demise, currently pregnant  There is normal fetal growth with an EFW of 1111 g at the 42% and the AC at the 49% on 2023.  AFV is normal.     With increased risk for recurrence, it was agreed to continue asa 81 mg BID until 34 6/7 weeks, then decrease to once daily until delivery.. Preeclampsia precautions reviewed.    We will also check growth every 4 weeks with closer fetal surveillance if evidence of preeclampsia or other concerns.      Increased BMI in pregnancy with excessive weight gain  Body mass index is 33.11 kg/m². With 4 lb gain in 1 month, she was advised to continue healthy and low caloric diet avoiding further "
Instructions: This plan will send the code FBSE to the PM system.  DO NOT or CHANGE the price.
Price (Do Not Change): 0.00
excessive weight gain.  Excess weight gain would be associated with gestational hypertension, gestational diabetes and adverse  outcomes, including fetal demise in utero.    With risk factors associated with increased BMI, she is to do fetal kick counts throughout the pregnancy (after 24 weeks).    It is important to lose weight after the pregnancy is over, especially before a future pregnancy was discussed. Breastfeeding may be an important tool in reducing the postpartum weight retention. Fetal risks were discussed with short term risk of fetal/ obesity and long term risk of adolescent component of metabolic syndrome.       Chronic hypertension in pregnancy  She had elevated BP at 18 weeks, and several additional elevated BP readings following that.  With these blood pressure elevations and a uric acid of 3.0, this is consistent with the initial suspected diagnosis of chronic hypertension.    Chronic hypertension complicates up to 2-5% of pregnancies and is associated with significant adverse pregnancy outcomes including  birth, fetal growth restriction, fetal demise, placental abruption, and  delivery.    BP Readings from Last 1 Encounters:   23 (!) 126/91     With this BP, she was advised to follow low sodium diet with no medication at this time.     Low dose aspirin as discussed.    She would benefit from follow-up ultrasounds to monitor growth every 4 weeks until the end of pregnancy. Recommend fetal testing starting around 32 weeks gestation until the end of pregnancy.    Among patients with uncomplicated chronic hypertension with no additional risk factors, delivery at 38-39 weeks appears to provide optimal balance between the risk of adverse fetal and  outcomes. If no medication and normal fetal assessment, recommend delivery at 39 weeks. However, will reassess closer to EDC to determine optimal timeframe or GA for delivery, based on current evaluation at that 
Detail Level: Simple
time.      Follow up in about 4 weeks (around 1/24/2024) for [facility] follow-up, Repeat ultrasound, BPP.     Blood pressure elevations prior to 20 weeks, with normal uric acid of 3.0, and normal other preeclampsia labs are all consistent with mild chronic hypertension.  The blood pressure is not high enough to require antihypertensive treatment although if it gets any higher we will implement anti hypertensive treatment starting with a small dose of potentially labetalol 100 mg q.12h depending upon blood pressures.  Fetal kick counts and preeclampsia warnings.  Continue aspirin twice a day.  We will plan to see her in 4 weeks.    Future Appointments   Date Time Provider Department Center   1/24/2024  1:00 PM Tejas Hastings MD [facility]   1/24/2024  1:00 PM ROOM 1 AND 2, [facility]     ANA MARIA involvement: Patient was evaluated and examined by Dr. Hastings. LEIDY Krishnamurthy, helped in pre charting of part of note.    Components of this note were documented using voice recognition systems and are subject to errors not corrected at proofreading. Please contact the author for any clarifications.

## 2024-07-13 NOTE — PROCEDURE: FULL BODY SKIN EXAM
no
Price (Do Not Change): 0.00
Detail Level: Simple
Instructions: This plan will send the code FBSE to the PM system.  DO NOT or CHANGE the price.

## 2024-07-16 ENCOUNTER — APPOINTMENT (RX ONLY)
Dept: URBAN - METROPOLITAN AREA CLINIC 150 | Facility: CLINIC | Age: 79
Setting detail: DERMATOLOGY
End: 2024-07-16

## 2024-07-16 PROBLEM — C44.622 SQUAMOUS CELL CARCINOMA OF SKIN OF RIGHT UPPER LIMB, INCLUDING SHOULDER: Status: ACTIVE | Noted: 2024-07-16

## 2024-07-16 PROCEDURE — 11602 EXC TR-EXT MAL+MARG 1.1-2 CM: CPT

## 2024-07-16 PROCEDURE — 12031 INTMD RPR S/A/T/EXT 2.5 CM/<: CPT

## 2024-07-16 PROCEDURE — ? EXCISION

## 2024-07-16 PROCEDURE — ? COUNSELING

## 2024-07-16 NOTE — PROCEDURE: EXCISION
Surgeon Performing The Repair (Optional): Warner Iglesias M.D.
Accession #: [id number]
Size Of Lesion In Cm: 1
X Size Of Lesion In Cm (Optional): 0
Size Of Margin In Cm: 0.4
Anesthesia Volume In Cc: 3
Was An Eye Clamp Used?: No
Eye Clamp Note Details: An eye clamp was used during the procedure.
Excision Method: Fusiform
Saucerization Depth: dermis and superficial adipose tissue
Repair Type: Intermediate
Intermediate / Complex Repair - Final Wound Length In Cm: 2.1
Suturegard Retention Suture: 2-0 Nylon
Retention Suture Bite Size: 3 mm
Length To Time In Minutes Device Was In Place: 10
Undermining Type: Entire Wound
Debridement Text: The wound edges were debrided prior to proceeding with the closure to facilitate wound healing.
Helical Rim Text: The closure involved the helical rim.
Vermilion Border Text: The closure involved the vermilion border.
Nostril Rim Text: The closure involved the nostril rim.
Retention Suture Text: Retention sutures were placed to support the closure and prevent dehiscence.
Primary Defect Length (In Cm): 1.2
Primary Defect Width (In Cm): 0.9
Suture Removal: 14 days
Lab: [id number]
Graft Donor Site Bandage (Optional-Leave Blank If You Don't Want In Note): Steri-strips and a pressure bandage were applied to the donor site.
Epidermal Closure Graft Donor Site (Optional): simple interrupted
Billing Type: Third-Party Bill
Excision Depth: adipose tissue
Scalpel Size: 15 blade
Anesthesia Type: 1% lidocaine with epinephrine
Hemostasis: Electrocautery
Estimated Blood Loss (Cc): minimal
Detail Level: Detailed
Repair Depth: use same depth as excision depth
Deep Sutures: 4-0 Vicryl
Epidermal Sutures: 4-0 Ethilon
Wound Care: Petrolatum
Dressing: dry sterile dressing
Suturegard Intro: Intraoperative tissue expansion was performed, utilizing the SUTUREGARD device, in order to reduce wound tension.
Suturegard Body: The suture ends were repeatedly re-tightened and re-clamped to achieve the desired tissue expansion.
Hemigard Intro: Due to skin fragility and wound tension, it was decided to use HEMIGARD adhesive retention suture devices to permit a linear closure. The skin was cleaned and dried for a 6cm distance away from the wound. Excessive hair, if present, was removed to allow for adhesion.
Hemigard Postcare Instructions: The HEMIGARD strips are to remain completely dry for at least 5-7 days.
Positioning (Leave Blank If You Do Not Want): The patient was placed in a comfortable position exposing the surgical site.
Pre-Excision Curettage Text (Leave Blank If You Do Not Want): Prior to drawing the surgical margin the visible lesion was removed with electrodesiccation and curettage to clearly define the lesion size.
Complex Repair Preamble Text (Leave Blank If You Do Not Want): Extensive wide undermining was performed.
Intermediate Repair Preamble Text (Leave Blank If You Do Not Want): Undermining was performed with blunt dissection.
Curvilinear Excision Additional Text (Leave Blank If You Do Not Want): The margin was drawn around the clinically apparent lesion.  A curvilinear shape was then drawn on the skin incorporating the lesion and margins.  Incisions were then made along these lines to the appropriate tissue plane and the lesion was extirpated.
Fusiform Excision Additional Text (Leave Blank If You Do Not Want): The margin was drawn around the clinically apparent lesion.  A fusiform shape was then drawn on the skin incorporating the lesion and margins.  Incisions were then made along these lines to the appropriate tissue plane and the lesion was extirpated.
Elliptical Excision Additional Text (Leave Blank If You Do Not Want): The margin was drawn around the clinically apparent lesion.  An elliptical shape was then drawn on the skin incorporating the lesion and margins.  Incisions were then made along these lines to the appropriate tissue plane and the lesion was extirpated.
Saucerization Excision Additional Text (Leave Blank If You Do Not Want): The margin was drawn around the clinically apparent lesion.  Incisions were then made along these lines, in a tangential fashion, to the appropriate tissue plane and the lesion was extirpated.
Slit Excision Additional Text (Leave Blank If You Do Not Want): A linear line was drawn on the skin overlying the lesion. An incision was made slowly until the lesion was visualized.  Once visualized, the lesion was removed with blunt dissection.
Excisional Biopsy Additional Text (Leave Blank If You Do Not Want): The margin was drawn around the clinically apparent lesion. An elliptical shape was then drawn on the skin incorporating the lesion and margins.  Incisions were then made along these lines to the appropriate tissue plane and the lesion was extirpated.
Perilesional Excision Additional Text (Leave Blank If You Do Not Want): The margin was drawn around the clinically apparent lesion. Incisions were then made along these lines to the appropriate tissue plane and the lesion was extirpated.
Repair Performed By Another Provider Text (Leave Blank If You Do Not Want): After the tissue was excised the defect was repaired by another provider.
No Repair - Repaired With Adjacent Surgical Defect Text (Leave Blank If You Do Not Want): After the excision the defect was repaired concurrently with another surgical defect which was in close approximation.
Adjacent Tissue Transfer Text: The defect edges were debeveled with a #15 scalpel blade. Given the location of the defect and the proximity to free margins an adjacent tissue transfer was deemed most appropriate. Using a sterile surgical marker, an appropriate flap was drawn incorporating the defect and placing the expected incisions within the relaxed skin tension lines where possible. The area thus outlined was incised deep to adipose tissue with a #15 scalpel blade. The skin margins were undermined to an appropriate distance in all directions utilizing iris scissors and carried over to close the primary defect.
Advancement Flap (Single) Text: The defect edges were debeveled with a #15 scalpel blade.  Given the location of the defect and the proximity to free margins a single advancement flap was deemed most appropriate.  Using a sterile surgical marker, an appropriate advancement flap was drawn incorporating the defect and placing the expected incisions within the relaxed skin tension lines where possible.    The area thus outlined was incised deep to adipose tissue with a #15 scalpel blade.  The skin margins were undermined to an appropriate distance in all directions utilizing iris scissors.
Advancement Flap (Double) Text: The defect edges were debeveled with a #15 scalpel blade.  Given the location of the defect and the proximity to free margins a double advancement flap was deemed most appropriate.  Using a sterile surgical marker, the appropriate advancement flaps were drawn incorporating the defect and placing the expected incisions within the relaxed skin tension lines where possible.    The area thus outlined was incised deep to adipose tissue with a #15 scalpel blade.  The skin margins were undermined to an appropriate distance in all directions utilizing iris scissors.
Burow's Advancement Flap Text: The defect edges were debeveled with a #15 scalpel blade.  Given the location of the defect and the proximity to free margins a Burow's advancement flap was deemed most appropriate.  Using a sterile surgical marker, the appropriate advancement flap was drawn incorporating the defect and placing the expected incisions within the relaxed skin tension lines where possible.    The area thus outlined was incised deep to adipose tissue with a #15 scalpel blade.  The skin margins were undermined to an appropriate distance in all directions utilizing iris scissors.
Chonodrocutaneous Helical Advancement Flap Text: The defect edges were debeveled with a #15 scalpel blade. Given the location of the defect and the proximity to free margins a chondrocutaneous helical advancement flap was deemed most appropriate. Using a sterile surgical marker, the appropriate advancement flap was drawn incorporating the defect and placing the expected incisions within the relaxed skin tension lines where possible. The area thus outlined was incised deep to adipose tissue with a #15 scalpel blade. The skin margins were undermined to an appropriate distance in all directions utilizing iris scissors. Following this, the designed flap was advanced and carried over into the primary defect and sutured into place.
Crescentic Advancement Flap Text: The defect edges were debeveled with a #15 scalpel blade.  Given the location of the defect and the proximity to free margins a crescentic advancement flap was deemed most appropriate.  Using a sterile surgical marker, the appropriate advancement flap was drawn incorporating the defect and placing the expected incisions within the relaxed skin tension lines where possible.    The area thus outlined was incised deep to adipose tissue with a #15 scalpel blade.  The skin margins were undermined to an appropriate distance in all directions utilizing iris scissors.
A-T Advancement Flap Text: The defect edges were debeveled with a #15 scalpel blade.  Given the location of the defect, shape of the defect and the proximity to free margins an A-T advancement flap was deemed most appropriate.  Using a sterile surgical marker, an appropriate advancement flap was drawn incorporating the defect and placing the expected incisions within the relaxed skin tension lines where possible.    The area thus outlined was incised deep to adipose tissue with a #15 scalpel blade.  The skin margins were undermined to an appropriate distance in all directions utilizing iris scissors.
O-T Advancement Flap Text: The defect edges were debeveled with a #15 scalpel blade.  Given the location of the defect, shape of the defect and the proximity to free margins an O-T advancement flap was deemed most appropriate.  Using a sterile surgical marker, an appropriate advancement flap was drawn incorporating the defect and placing the expected incisions within the relaxed skin tension lines where possible.    The area thus outlined was incised deep to adipose tissue with a #15 scalpel blade.  The skin margins were undermined to an appropriate distance in all directions utilizing iris scissors.
O-L Flap Text: The defect edges were debeveled with a #15 scalpel blade.  Given the location of the defect, shape of the defect and the proximity to free margins an O-L flap was deemed most appropriate.  Using a sterile surgical marker, an appropriate advancement flap was drawn incorporating the defect and placing the expected incisions within the relaxed skin tension lines where possible.    The area thus outlined was incised deep to adipose tissue with a #15 scalpel blade.  The skin margins were undermined to an appropriate distance in all directions utilizing iris scissors.
O-Z Flap Text: The defect edges were debeveled with a #15 scalpel blade. Given the location of the defect, shape of the defect and the proximity to free margins an O-Z flap was deemed most appropriate. Using a sterile surgical marker, an appropriate transposition flap was drawn incorporating the defect and placing the expected incisions within the relaxed skin tension lines where possible. The area thus outlined was incised deep to adipose tissue with a #15 scalpel blade. The skin margins were undermined to an appropriate distance in all directions utilizing iris scissors. Following this, the designed flap was carried over into the primary defect and sutured into place.
Double O-Z Flap Text: The defect edges were debeveled with a #15 scalpel blade. Given the location of the defect, shape of the defect and the proximity to free margins a Double O-Z flap was deemed most appropriate. Using a sterile surgical marker, an appropriate transposition flap was drawn incorporating the defect and placing the expected incisions within the relaxed skin tension lines where possible. The area thus outlined was incised deep to adipose tissue with a #15 scalpel blade. The skin margins were undermined to an appropriate distance in all directions utilizing iris scissors. Following this, the designed flap was carried over into the primary defect and sutured into place.
V-Y Flap Text: The defect edges were debeveled with a #15 scalpel blade.  Given the location of the defect, shape of the defect and the proximity to free margins a V-Y flap was deemed most appropriate.  Using a sterile surgical marker, an appropriate advancement flap was drawn incorporating the defect and placing the expected incisions within the relaxed skin tension lines where possible.    The area thus outlined was incised deep to adipose tissue with a #15 scalpel blade.  The skin margins were undermined to an appropriate distance in all directions utilizing iris scissors.
Advancement-Rotation Flap Text: The defect edges were debeveled with a #15 scalpel blade.  Given the location of the defect, shape of the defect and the proximity to free margins an advancement-rotation flap was deemed most appropriate.  Using a sterile surgical marker, an appropriate flap was drawn incorporating the defect and placing the expected incisions within the relaxed skin tension lines where possible. The area thus outlined was incised deep to adipose tissue with a #15 scalpel blade.  The skin margins were undermined to an appropriate distance in all directions utilizing iris scissors.
Mercedes Flap Text: The defect edges were debeveled with a #15 scalpel blade.  Given the location of the defect, shape of the defect and the proximity to free margins a Mercedes flap was deemed most appropriate.  Using a sterile surgical marker, an appropriate advancement flap was drawn incorporating the defect and placing the expected incisions within the relaxed skin tension lines where possible. The area thus outlined was incised deep to adipose tissue with a #15 scalpel blade.  The skin margins were undermined to an appropriate distance in all directions utilizing iris scissors.
Modified Advancement Flap Text: The defect edges were debeveled with a #15 scalpel blade.  Given the location of the defect, shape of the defect and the proximity to free margins a modified advancement flap was deemed most appropriate.  Using a sterile surgical marker, an appropriate advancement flap was drawn incorporating the defect and placing the expected incisions within the relaxed skin tension lines where possible.    The area thus outlined was incised deep to adipose tissue with a #15 scalpel blade.  The skin margins were undermined to an appropriate distance in all directions utilizing iris scissors.
Mucosal Advancement Flap Text: Given the location of the defect, shape of the defect and the proximity to free margins a mucosal advancement flap was deemed most appropriate. Incisions were made with a 15 blade scalpel in the appropriate fashion along the cutaneous vermilion border and the mucosal lip. The remaining actinically damaged mucosal tissue was excised.  The mucosal advancement flap was then elevated to the gingival sulcus with care taken to preserve the neurovascular structures and advanced into the primary defect. Care was taken to ensure that precise realignment of the vermilion border was achieved.
Peng Advancement Flap Text: The defect edges were debeveled with a #15 scalpel blade. Given the location of the defect, shape of the defect and the proximity to free margins a Peng advancement flap was deemed most appropriate. Using a sterile surgical marker, an appropriate advancement flap was drawn incorporating the defect and placing the expected incisions within the relaxed skin tension lines where possible. The area thus outlined was incised deep to adipose tissue with a #15 scalpel blade. The skin margins were undermined to an appropriate distance in all directions utilizing iris scissors. Following this, the designed flap was advanced and carried over into the primary defect and sutured into place.
Hatchet Flap Text: The defect edges were debeveled with a #15 scalpel blade.  Given the location of the defect, shape of the defect and the proximity to free margins a hatchet flap was deemed most appropriate.  Using a sterile surgical marker, an appropriate hatchet flap was drawn incorporating the defect and placing the expected incisions within the relaxed skin tension lines where possible.    The area thus outlined was incised deep to adipose tissue with a #15 scalpel blade.  The skin margins were undermined to an appropriate distance in all directions utilizing iris scissors.
Rotation Flap Text: The defect edges were debeveled with a #15 scalpel blade.  Given the location of the defect, shape of the defect and the proximity to free margins a rotation flap was deemed most appropriate.  Using a sterile surgical marker, an appropriate rotation flap was drawn incorporating the defect and placing the expected incisions within the relaxed skin tension lines where possible.    The area thus outlined was incised deep to adipose tissue with a #15 scalpel blade.  The skin margins were undermined to an appropriate distance in all directions utilizing iris scissors.
Bilateral Rotation Flap Text: The defect edges were debeveled with a #15 scalpel blade. Given the location of the defect, shape of the defect and the proximity to free margins a bilateral rotation flap was deemed most appropriate. Using a sterile surgical marker, an appropriate rotation flap was drawn incorporating the defect and placing the expected incisions within the relaxed skin tension lines where possible. The area thus outlined was incised deep to adipose tissue with a #15 scalpel blade. The skin margins were undermined to an appropriate distance in all directions utilizing iris scissors. Following this, the designed flap was carried over into the primary defect and sutured into place.
Spiral Flap Text: The defect edges were debeveled with a #15 scalpel blade.  Given the location of the defect, shape of the defect and the proximity to free margins a spiral flap was deemed most appropriate.  Using a sterile surgical marker, an appropriate rotation flap was drawn incorporating the defect and placing the expected incisions within the relaxed skin tension lines where possible. The area thus outlined was incised deep to adipose tissue with a #15 scalpel blade.  The skin margins were undermined to an appropriate distance in all directions utilizing iris scissors.
Staged Advancement Flap Text: The defect edges were debeveled with a #15 scalpel blade. Given the location of the defect, shape of the defect and the proximity to free margins a staged advancement flap was deemed most appropriate. Using a sterile surgical marker, an appropriate advancement flap was drawn incorporating the defect and placing the expected incisions within the relaxed skin tension lines where possible. The area thus outlined was incised deep to adipose tissue with a #15 scalpel blade. The skin margins were undermined to an appropriate distance in all directions utilizing iris scissors. Following this, the designed flap was carried over into the primary defect and sutured into place.
Star Wedge Flap Text: The defect edges were debeveled with a #15 scalpel blade.  Given the location of the defect, shape of the defect and the proximity to free margins a star wedge flap was deemed most appropriate.  Using a sterile surgical marker, an appropriate rotation flap was drawn incorporating the defect and placing the expected incisions within the relaxed skin tension lines where possible. The area thus outlined was incised deep to adipose tissue with a #15 scalpel blade.  The skin margins were undermined to an appropriate distance in all directions utilizing iris scissors.
Transposition Flap Text: The defect edges were debeveled with a #15 scalpel blade.  Given the location of the defect and the proximity to free margins a transposition flap was deemed most appropriate.  Using a sterile surgical marker, an appropriate transposition flap was drawn incorporating the defect.    The area thus outlined was incised deep to adipose tissue with a #15 scalpel blade.  The skin margins were undermined to an appropriate distance in all directions utilizing iris scissors.
Muscle Hinge Flap Text: The defect edges were debeveled with a #15 scalpel blade.  Given the size, depth and location of the defect and the proximity to free margins a muscle hinge flap was deemed most appropriate.  Using a sterile surgical marker, an appropriate hinge flap was drawn incorporating the defect. The area thus outlined was incised with a #15 scalpel blade.  The skin margins were undermined to an appropriate distance in all directions utilizing iris scissors.
Mustarde Flap Text: The defect edges were debeveled with a #15 scalpel blade.  Given the size, depth and location of the defect and the proximity to free margins a Mustarde flap was deemed most appropriate. Using a sterile surgical marker, an appropriate flap was drawn incorporating the defect. The area thus outlined was incised with a #15 scalpel blade. The skin margins were undermined to an appropriate distance in all directions utilizing iris scissors. Following this, the designed flap was carried into the primary defect and sutured into place.
Nasal Turnover Hinge Flap Text: The defect edges were debeveled with a #15 scalpel blade.  Given the size, depth, location of the defect and the defect being full thickness a nasal turnover hinge flap was deemed most appropriate. Using a sterile surgical marker, an appropriate hinge flap was drawn incorporating the defect. The area thus outlined was incised with a #15 scalpel blade. The flap was designed to recreate the nasal mucosal lining and the alar rim. The skin margins were undermined to an appropriate distance in all directions utilizing iris scissors. Following this, the designed flap was carried over into the primary defect and sutured into place
Nasalis-Muscle-Based Myocutaneous Island Pedicle Flap Text: Using a #15 blade, an incision was made around the donor flap to the level of the nasalis muscle. Wide lateral undermining was then performed in both the subcutaneous plane above the nasalis muscle, and in a submuscular plane just above periosteum. This allowed the formation of a free nasalis muscle axial pedicle (based on the angular artery) which was still attached to the actual cutaneous flap, increasing its mobility and vascular viability. Hemostasis was obtained with pinpoint electrocoagulation. The flap was mobilized into position and the pivotal anchor points positioned and stabilized with buried interrupted sutures. Subcutaneous and dermal tissues were closed in a multilayered fashion with sutures. Tissue redundancies were excised, and the epidermal edges were apposed without significant tension and sutured with sutures.
Nasalis Myocutaneous Flap Text: Using a #15 blade, an incision was made around the donor flap to the level of the nasalis muscle. Wide lateral undermining was then performed in both the subcutaneous plane above the nasalis muscle, and in a submuscular plane just above periosteum. This allowed the formation of a free nasalis muscle axial pedicle which was still attached to the actual cutaneous flap, increasing its mobility and vascular viability. Hemostasis was obtained with pinpoint electrocoagulation. The flap was mobilized into position and the pivotal anchor points positioned and stabilized with buried interrupted sutures. Subcutaneous and dermal tissues were closed in a multilayered fashion with sutures. Tissue redundancies were excised, and the epidermal edges were apposed without significant tension and sutured with sutures.
Nasolabial Transposition Flap Text: The defect edges were debeveled with a #15 scalpel blade.  Given the size, depth and location of the defect and the proximity to free margins a nasolabial transposition flap was deemed most appropriate. Using a sterile surgical marker, an appropriate flap was drawn incorporating the defect. The area thus outlined was incised with a #15 scalpel blade. The skin margins were undermined to an appropriate distance in all directions utilizing iris scissors. Following this, the designed flap was carried into the primary defect and sutured into place.
Orbicularis Oris Muscle Flap Text: The defect edges were debeveled with a #15 scalpel blade.  Given that the defect affected the competency of the oral sphincter an orbicularis oris muscle flap was deemed most appropriate to restore this competency and normal muscle function.  Using a sterile surgical marker, an appropriate flap was drawn incorporating the defect. The area thus outlined was incised with a #15 scalpel blade. Following this, the designed flap was carried over into the primary defect and sutured into place.
Melolabial Transposition Flap Text: The defect edges were debeveled with a #15 scalpel blade.  Given the location of the defect and the proximity to free margins a melolabial flap was deemed most appropriate.  Using a sterile surgical marker, an appropriate melolabial transposition flap was drawn incorporating the defect.    The area thus outlined was incised deep to adipose tissue with a #15 scalpel blade.  The skin margins were undermined to an appropriate distance in all directions utilizing iris scissors.
Rectangular Flap Text: The defect edges were debeveled with a #15 scalpel blade. Given the location of the defect and the proximity to free margins a rectangular flap was deemed most appropriate. Using a sterile surgical marker, an appropriate rectangular flap was drawn incorporating the defect. The area thus outlined was incised deep to adipose tissue with a #15 scalpel blade. The skin margins were undermined to an appropriate distance in all directions utilizing iris scissors. Following this, the designed flap was carried over into the primary defect and sutured into place.
Rhombic Flap Text: The defect edges were debeveled with a #15 scalpel blade.  Given the location of the defect and the proximity to free margins a rhombic flap was deemed most appropriate.  Using a sterile surgical marker, an appropriate rhombic flap was drawn incorporating the defect.    The area thus outlined was incised deep to adipose tissue with a #15 scalpel blade.  The skin margins were undermined to an appropriate distance in all directions utilizing iris scissors.
Rhomboid Transposition Flap Text: The defect edges were debeveled with a #15 scalpel blade. Given the location of the defect and the proximity to free margins a rhomboid transposition flap was deemed most appropriate. Using a sterile surgical marker, an appropriate rhomboid flap was drawn incorporating the defect. The area thus outlined was incised deep to adipose tissue with a #15 scalpel blade. The skin margins were undermined to an appropriate distance in all directions utilizing iris scissors. Following this, the designed flap was carried over into the primary defect and sutured into place.
Bi-Rhombic Flap Text: The defect edges were debeveled with a #15 scalpel blade.  Given the location of the defect and the proximity to free margins a bi-rhombic flap was deemed most appropriate.  Using a sterile surgical marker, an appropriate rhombic flap was drawn incorporating the defect. The area thus outlined was incised deep to adipose tissue with a #15 scalpel blade.  The skin margins were undermined to an appropriate distance in all directions utilizing iris scissors.
Helical Rim Advancement Flap Text: The defect edges were debeveled with a #15 blade scalpel.  Given the location of the defect and the proximity to free margins (helical rim) a double helical rim advancement flap was deemed most appropriate.  Using a sterile surgical marker, the appropriate advancement flaps were drawn incorporating the defect and placing the expected incisions between the helical rim and antihelix where possible.  The area thus outlined was incised through and through with a #15 scalpel blade.  With a skin hook and iris scissors, the flaps were gently and sharply undermined and freed up.
Bilateral Helical Rim Advancement Flap Text: The defect edges were debeveled with a #15 blade scalpel.  Given the location of the defect and the proximity to free margins (helical rim) a bilateral helical rim advancement flap was deemed most appropriate.  Using a sterile surgical marker, the appropriate advancement flaps were drawn incorporating the defect and placing the expected incisions between the helical rim and antihelix where possible.  The area thus outlined was incised through and through with a #15 scalpel blade.  With a skin hook and iris scissors, the flaps were gently and sharply undermined and freed up.
Ear Star Wedge Flap Text: The defect edges were debeveled with a #15 blade scalpel.  Given the location of the defect and the proximity to free margins (helical rim) an ear star wedge flap was deemed most appropriate.  Using a sterile surgical marker, the appropriate flap was drawn incorporating the defect and placing the expected incisions between the helical rim and antihelix where possible.  The area thus outlined was incised through and through with a #15 scalpel blade.
Banner Transposition Flap Text: The defect edges were debeveled with a #15 scalpel blade.  Given the location of the defect and the proximity to free margins a Banner transposition flap was deemed most appropriate.  Using a sterile surgical marker, an appropriate flap drawn around the defect. The area thus outlined was incised deep to adipose tissue with a #15 scalpel blade.  The skin margins were undermined to an appropriate distance in all directions utilizing iris scissors.
Bilobed Flap Text: The defect edges were debeveled with a #15 scalpel blade.  Given the location of the defect and the proximity to free margins a bilobe flap was deemed most appropriate.  Using a sterile surgical marker, an appropriate bilobe flap drawn around the defect.    The area thus outlined was incised deep to adipose tissue with a #15 scalpel blade.  The skin margins were undermined to an appropriate distance in all directions utilizing iris scissors.
Bilobed Transposition Flap Text: The defect edges were debeveled with a #15 scalpel blade.  Given the location of the defect and the proximity to free margins a bilobed transposition flap was deemed most appropriate.  Using a sterile surgical marker, an appropriate bilobe flap drawn around the defect.    The area thus outlined was incised deep to adipose tissue with a #15 scalpel blade.  The skin margins were undermined to an appropriate distance in all directions utilizing iris scissors.
Trilobed Flap Text: The defect edges were debeveled with a #15 scalpel blade.  Given the location of the defect and the proximity to free margins a trilobed flap was deemed most appropriate.  Using a sterile surgical marker, an appropriate trilobed flap drawn around the defect.    The area thus outlined was incised deep to adipose tissue with a #15 scalpel blade.  The skin margins were undermined to an appropriate distance in all directions utilizing iris scissors.
Dorsal Nasal Flap Text: The defect edges were debeveled with a #15 scalpel blade.  Given the location of the defect and the proximity to free margins a dorsal nasal flap was deemed most appropriate.  Using a sterile surgical marker, an appropriate dorsal nasal flap was drawn around the defect.    The area thus outlined was incised deep to adipose tissue with a #15 scalpel blade.  The skin margins were undermined to an appropriate distance in all directions utilizing iris scissors.
Island Pedicle Flap Text: The defect edges were debeveled with a #15 scalpel blade.  Given the location of the defect, shape of the defect and the proximity to free margins an island pedicle advancement flap was deemed most appropriate.  Using a sterile surgical marker, an appropriate advancement flap was drawn incorporating the defect, outlining the appropriate donor tissue and placing the expected incisions within the relaxed skin tension lines where possible.    The area thus outlined was incised deep to adipose tissue with a #15 scalpel blade.  The skin margins were undermined to an appropriate distance in all directions around the primary defect and laterally outward around the island pedicle utilizing iris scissors.  There was minimal undermining beneath the pedicle flap.
Island Pedicle Flap With Canthal Suspension Text: The defect edges were debeveled with a #15 scalpel blade.  Given the location of the defect, shape of the defect and the proximity to free margins an island pedicle advancement flap was deemed most appropriate.  Using a sterile surgical marker, an appropriate advancement flap was drawn incorporating the defect, outlining the appropriate donor tissue and placing the expected incisions within the relaxed skin tension lines where possible. The area thus outlined was incised deep to adipose tissue with a #15 scalpel blade.  The skin margins were undermined to an appropriate distance in all directions around the primary defect and laterally outward around the island pedicle utilizing iris scissors.  There was minimal undermining beneath the pedicle flap. A suspension suture was placed in the canthal tendon to prevent tension and prevent ectropion.
Alar Island Pedicle Flap Text: The defect edges were debeveled with a #15 scalpel blade.  Given the location of the defect, shape of the defect and the proximity to the alar rim an island pedicle advancement flap was deemed most appropriate.  Using a sterile surgical marker, an appropriate advancement flap was drawn incorporating the defect, outlining the appropriate donor tissue and placing the expected incisions within the nasal ala running parallel to the alar rim. The area thus outlined was incised with a #15 scalpel blade.  The skin margins were undermined minimally to an appropriate distance in all directions around the primary defect and laterally outward around the island pedicle utilizing iris scissors.  There was minimal undermining beneath the pedicle flap.
Double Island Pedicle Flap Text: The defect edges were debeveled with a #15 scalpel blade.  Given the location of the defect, shape of the defect and the proximity to free margins a double island pedicle advancement flap was deemed most appropriate.  Using a sterile surgical marker, an appropriate advancement flap was drawn incorporating the defect, outlining the appropriate donor tissue and placing the expected incisions within the relaxed skin tension lines where possible.    The area thus outlined was incised deep to adipose tissue with a #15 scalpel blade.  The skin margins were undermined to an appropriate distance in all directions around the primary defect and laterally outward around the island pedicle utilizing iris scissors.  There was minimal undermining beneath the pedicle flap.
Island Pedicle Flap-Requiring Vessel Identification Text: The defect edges were debeveled with a #15 scalpel blade.  Given the location of the defect, shape of the defect and the proximity to free margins an island pedicle advancement flap was deemed most appropriate.  Using a sterile surgical marker, an appropriate advancement flap was drawn, based on the axial vessel mentioned above, incorporating the defect, outlining the appropriate donor tissue and placing the expected incisions within the relaxed skin tension lines where possible.    The area thus outlined was incised deep to adipose tissue with a #15 scalpel blade.  The skin margins were undermined to an appropriate distance in all directions around the primary defect and laterally outward around the island pedicle utilizing iris scissors.  There was minimal undermining beneath the pedicle flap.
Keystone Flap Text: The defect edges were debeveled with a #15 scalpel blade.  Given the location of the defect, shape of the defect a keystone flap was deemed most appropriate.  Using a sterile surgical marker, an appropriate keystone flap was drawn incorporating the defect, outlining the appropriate donor tissue and placing the expected incisions within the relaxed skin tension lines where possible. The area thus outlined was incised deep to adipose tissue with a #15 scalpel blade.  The skin margins were undermined to an appropriate distance in all directions around the primary defect and laterally outward around the flap utilizing iris scissors.
O-T Plasty Text: The defect edges were debeveled with a #15 scalpel blade.  Given the location of the defect, shape of the defect and the proximity to free margins an O-T plasty was deemed most appropriate.  Using a sterile surgical marker, an appropriate O-T plasty was drawn incorporating the defect and placing the expected incisions within the relaxed skin tension lines where possible.    The area thus outlined was incised deep to adipose tissue with a #15 scalpel blade.  The skin margins were undermined to an appropriate distance in all directions utilizing iris scissors.
O-Z Plasty Text: The defect edges were debeveled with a #15 scalpel blade.  Given the location of the defect, shape of the defect and the proximity to free margins an O-Z plasty (double transposition flap) was deemed most appropriate.  Using a sterile surgical marker, the appropriate transposition flaps were drawn incorporating the defect and placing the expected incisions within the relaxed skin tension lines where possible.    The area thus outlined was incised deep to adipose tissue with a #15 scalpel blade.  The skin margins were undermined to an appropriate distance in all directions utilizing iris scissors.  Hemostasis was achieved with electrocautery.  The flaps were then transposed into place, one clockwise and the other counterclockwise, and anchored with interrupted buried subcutaneous sutures.
Double O-Z Plasty Text: The defect edges were debeveled with a #15 scalpel blade. Given the location of the defect, shape of the defect and the proximity to free margins a Double O-Z plasty (double transposition flap) was deemed most appropriate. Using a sterile surgical marker, the appropriate transposition flaps were drawn incorporating the defect and placing the expected incisions within the relaxed skin tension lines where possible. The area thus outlined was incised deep to adipose tissue with a #15 scalpel blade. The skin margins were undermined to an appropriate distance in all directions utilizing iris scissors. Hemostasis was achieved with electrocautery. The flaps were then transposed and carried over into place, one clockwise and the other counterclockwise, and anchored with interrupted buried subcutaneous sutures.
V-Y Plasty Text: The defect edges were debeveled with a #15 scalpel blade.  Given the location of the defect, shape of the defect and the proximity to free margins an V-Y advancement flap was deemed most appropriate.  Using a sterile surgical marker, an appropriate advancement flap was drawn incorporating the defect and placing the expected incisions within the relaxed skin tension lines where possible.    The area thus outlined was incised deep to adipose tissue with a #15 scalpel blade.  The skin margins were undermined to an appropriate distance in all directions utilizing iris scissors.
H Plasty Text: Given the location of the defect, shape of the defect and the proximity to free margins a H-plasty was deemed most appropriate for repair.  Using a sterile surgical marker, the appropriate advancement arms of the H-plasty were drawn incorporating the defect and placing the expected incisions within the relaxed skin tension lines where possible. The area thus outlined was incised deep to adipose tissue with a #15 scalpel blade. The skin margins were undermined to an appropriate distance in all directions utilizing iris scissors.  The opposing advancement arms were then advanced into place in opposite direction and anchored with interrupted buried subcutaneous sutures.
W Plasty Text: The lesion was extirpated to the level of the fat with a #15 scalpel blade.  Given the location of the defect, shape of the defect and the proximity to free margins a W-plasty was deemed most appropriate for repair.  Using a sterile surgical marker, the appropriate transposition arms of the W-plasty were drawn incorporating the defect and placing the expected incisions within the relaxed skin tension lines where possible.    The area thus outlined was incised deep to adipose tissue with a #15 scalpel blade.  The skin margins were undermined to an appropriate distance in all directions utilizing iris scissors.  The opposing transposition arms were then transposed into place in opposite direction and anchored with interrupted buried subcutaneous sutures.
Z Plasty Text: The lesion was extirpated to the level of the fat with a #15 scalpel blade.  Given the location of the defect, shape of the defect and the proximity to free margins a Z-plasty was deemed most appropriate for repair.  Using a sterile surgical marker, the appropriate transposition arms of the Z-plasty were drawn incorporating the defect and placing the expected incisions within the relaxed skin tension lines where possible.    The area thus outlined was incised deep to adipose tissue with a #15 scalpel blade.  The skin margins were undermined to an appropriate distance in all directions utilizing iris scissors.  The opposing transposition arms were then transposed into place in opposite direction and anchored with interrupted buried subcutaneous sutures.
Double Z Plasty Text: The lesion was extirpated to the level of the fat with a #15 scalpel blade. Given the location of the defect, shape of the defect and the proximity to free margins a double Z-plasty was deemed most appropriate for repair. Using a sterile surgical marker, the appropriate transposition arms of the double Z-plasty were drawn incorporating the defect and placing the expected incisions within the relaxed skin tension lines where possible. The area thus outlined was incised deep to adipose tissue with a #15 scalpel blade. The skin margins were undermined to an appropriate distance in all directions utilizing iris scissors. The opposing transposition arms were then transposed and carried over into place in opposite direction and anchored with interrupted buried subcutaneous sutures.
Zygomaticofacial Flap Text: Given the location of the defect, shape of the defect and the proximity to free margins a zygomaticofacial flap was deemed most appropriate for repair. Using a sterile surgical marker, the appropriate flap was drawn incorporating the defect and placing the expected incisions within the relaxed skin tension lines where possible. The area thus outlined was incised deep to adipose tissue with a #15 scalpel blade with preservation of a vascular pedicle.  The skin margins were undermined to an appropriate distance in all directions utilizing iris scissors. The flap was then carried over into the defect and anchored with interrupted buried subcutaneous sutures.
Cheek Interpolation Flap Text: A decision was made to reconstruct the defect utilizing an interpolation axial flap and a staged reconstruction.  A telfa template was made of the defect.  This telfa template was then used to outline the Cheek Interpolation flap.  The donor area for the pedicle flap was then injected with anesthesia.  The flap was excised through the skin and subcutaneous tissue down to the layer of the underlying musculature.  The interpolation flap was carefully excised within this deep plane to maintain its blood supply.  The edges of the donor site were undermined.   The donor site was closed in a primary fashion.  The pedicle was then rotated into position and sutured.  Once the tube was sutured into place, adequate blood supply was confirmed with blanching and refill.  The pedicle was then wrapped with xeroform gauze and dressed appropriately with a telfa and gauze bandage to ensure continued blood supply and protect the attached pedicle.
Cheek-To-Nose Interpolation Flap Text: A decision was made to reconstruct the defect utilizing an interpolation axial flap and a staged reconstruction.  A telfa template was made of the defect.  This telfa template was then used to outline the Cheek-To-Nose Interpolation flap.  The donor area for the pedicle flap was then injected with anesthesia.  The flap was excised through the skin and subcutaneous tissue down to the layer of the underlying musculature.  The interpolation flap was carefully excised within this deep plane to maintain its blood supply.  The edges of the donor site were undermined.   The donor site was closed in a primary fashion.  The pedicle was then rotated into position and sutured.  Once the tube was sutured into place, adequate blood supply was confirmed with blanching and refill.  The pedicle was then wrapped with xeroform gauze and dressed appropriately with a telfa and gauze bandage to ensure continued blood supply and protect the attached pedicle.
Interpolation Flap Text: A decision was made to reconstruct the defect utilizing an interpolation axial flap and a staged reconstruction.  A telfa template was made of the defect.  This telfa template was then used to outline the interpolation flap.  The donor area for the pedicle flap was then injected with anesthesia.  The flap was excised through the skin and subcutaneous tissue down to the layer of the underlying musculature.  The interpolation flap was carefully excised within this deep plane to maintain its blood supply.  The edges of the donor site were undermined.   The donor site was closed in a primary fashion.  The pedicle was then rotated into position and sutured.  Once the tube was sutured into place, adequate blood supply was confirmed with blanching and refill.  The pedicle was then wrapped with xeroform gauze and dressed appropriately with a telfa and gauze bandage to ensure continued blood supply and protect the attached pedicle.
Melolabial Interpolation Flap Text: A decision was made to reconstruct the defect utilizing an interpolation axial flap and a staged reconstruction.  A telfa template was made of the defect.  This telfa template was then used to outline the melolabial interpolation flap.  The donor area for the pedicle flap was then injected with anesthesia.  The flap was excised through the skin and subcutaneous tissue down to the layer of the underlying musculature.  The pedicle flap was carefully excised within this deep plane to maintain its blood supply.  The edges of the donor site were undermined.   The donor site was closed in a primary fashion.  The pedicle was then rotated into position and sutured.  Once the tube was sutured into place, adequate blood supply was confirmed with blanching and refill.  The pedicle was then wrapped with xeroform gauze and dressed appropriately with a telfa and gauze bandage to ensure continued blood supply and protect the attached pedicle.
Mastoid Interpolation Flap Text: A decision was made to reconstruct the defect utilizing an interpolation axial flap and a staged reconstruction.  A telfa template was made of the defect.  This telfa template was then used to outline the mastoid interpolation flap.  The donor area for the pedicle flap was then injected with anesthesia.  The flap was excised through the skin and subcutaneous tissue down to the layer of the underlying musculature.  The pedicle flap was carefully excised within this deep plane to maintain its blood supply.  The edges of the donor site were undermined.   The donor site was closed in a primary fashion.  The pedicle was then rotated into position and sutured.  Once the tube was sutured into place, adequate blood supply was confirmed with blanching and refill.  The pedicle was then wrapped with xeroform gauze and dressed appropriately with a telfa and gauze bandage to ensure continued blood supply and protect the attached pedicle.
Posterior Auricular Interpolation Flap Text: A decision was made to reconstruct the defect utilizing an interpolation axial flap and a staged reconstruction.  A telfa template was made of the defect.  This telfa template was then used to outline the posterior auricular interpolation flap.  The donor area for the pedicle flap was then injected with anesthesia.  The flap was excised through the skin and subcutaneous tissue down to the layer of the underlying musculature.  The pedicle flap was carefully excised within this deep plane to maintain its blood supply.  The edges of the donor site were undermined.   The donor site was closed in a primary fashion.  The pedicle was then rotated into position and sutured.  Once the tube was sutured into place, adequate blood supply was confirmed with blanching and refill.  The pedicle was then wrapped with xeroform gauze and dressed appropriately with a telfa and gauze bandage to ensure continued blood supply and protect the attached pedicle.
Paramedian Forehead Flap Text: A decision was made to reconstruct the defect utilizing an interpolation axial flap and a staged reconstruction.  A telfa template was made of the defect.  This telfa template was then used to outline the paramedian forehead pedicle flap.  The donor area for the pedicle flap was then injected with anesthesia.  The flap was excised through the skin and subcutaneous tissue down to the layer of the underlying musculature.  The pedicle flap was carefully excised within this deep plane to maintain its blood supply.  The edges of the donor site were undermined.   The donor site was closed in a primary fashion.  The pedicle was then rotated into position and sutured.  Once the tube was sutured into place, adequate blood supply was confirmed with blanching and refill.  The pedicle was then wrapped with xeroform gauze and dressed appropriately with a telfa and gauze bandage to ensure continued blood supply and protect the attached pedicle.
Abbe Flap (Upper To Lower Lip) Text: The defect of the lower lip was assessed and measured.  Given the location and size of the defect, an Abbe flap was deemed most appropriate. Using a sterile surgical marker, an appropriate Abbe flap was measured and drawn on the upper lip. Local anesthesia was then infiltrated.  A scalpel was then used to incise the upper lip through and through the skin, vermilion, muscle and mucosa, leaving the flap pedicled on the opposite side.  The flap was then rotated and transferred to the lower lip defect.  The flap was then sutured into place with a three layer technique, closing the orbicularis oris muscle layer with subcutaneous buried sutures, followed by a mucosal layer and an epidermal layer.
Abbe Flap (Lower To Upper Lip) Text: The defect of the upper lip was assessed and measured.  Given the location and size of the defect, an Abbe flap was deemed most appropriate. Using a sterile surgical marker, an appropriate Abbe flap was measured and drawn on the lower lip. Local anesthesia was then infiltrated. A scalpel was then used to incise the upper lip through and through the skin, vermilion, muscle and mucosa, leaving the flap pedicled on the opposite side.  The flap was then rotated and transferred to the lower lip defect.  The flap was then sutured into place with a three layer technique, closing the orbicularis oris muscle layer with subcutaneous buried sutures, followed by a mucosal layer and an epidermal layer.
Estlander Flap (Upper To Lower Lip) Text: The defect of the lower lip was assessed and measured.  Given the location and size of the defect, an Estlander flap was deemed most appropriate. Using a sterile surgical marker, an appropriate Estlander flap was measured and drawn on the upper lip. Local anesthesia was then infiltrated. A scalpel was then used to incise the lateral aspect of the flap, through skin, muscle and mucosa, leaving the flap pedicled medially.  The flap was then rotated and positioned to fill the lower lip defect.  The flap was then sutured into place with a three layer technique, closing the orbicularis oris muscle layer with subcutaneous buried sutures, followed by a mucosal layer and an epidermal layer.
Lip Wedge Excision Repair Text: Given the location of the defect and the proximity to free margins a full thickness wedge repair was deemed most appropriate.  Using a sterile surgical marker, the appropriate repair was drawn incorporating the defect and placing the expected incisions perpendicular to the vermilion border.  The vermilion border was also meticulously outlined to ensure appropriate reapproximation during the repair.  The area thus outlined was incised through and through with a #15 scalpel blade.  The muscularis and dermis were reaproximated with deep sutures following hemostasis. Care was taken to realign the vermilion border before proceeding with the superficial closure.  Once the vermilion was realigned the superfical and mucosal closure was finished.
Ftsg Text: The defect edges were debeveled with a #15 scalpel blade.  Given the location of the defect, shape of the defect and the proximity to free margins a full thickness skin graft was deemed most appropriate.  Using a sterile surgical marker, the primary defect shape was transferred to the donor site. The area thus outlined was incised deep to adipose tissue with a #15 scalpel blade.  The harvested graft was then trimmed of adipose tissue until only dermis and epidermis was left.  The skin margins of the secondary defect were undermined to an appropriate distance in all directions utilizing iris scissors.  The secondary defect was closed with interrupted buried subcutaneous sutures.  The skin edges were then re-apposed with running  sutures.  The skin graft was then placed in the primary defect and oriented appropriately.
Split-Thickness Skin Graft Text: The defect edges were debeveled with a #15 scalpel blade.  Given the location of the defect, shape of the defect and the proximity to free margins a split thickness skin graft was deemed most appropriate.  Using a sterile surgical marker, the primary defect shape was transferred to the donor site. The split thickness graft was then harvested.  The skin graft was then placed in the primary defect and oriented appropriately.
Pinch Graft Text: The defect edges were debeveled with a #15 scalpel blade. Given the location of the defect, shape of the defect and the proximity to free margins a pinch graft was deemed most appropriate. Using a sterile surgical marker, the primary defect shape was transferred to the donor site. The area thus outlined was incised deep to adipose tissue with a #15 scalpel blade.  The harvested graft was then trimmed of adipose tissue until only dermis and epidermis was left. The skin margins of the secondary defect were undermined to an appropriate distance in all directions utilizing iris scissors.  The secondary defect was closed with interrupted buried subcutaneous sutures.  The skin edges were then re-apposed with running  sutures.  The skin graft was then placed in the primary defect and oriented appropriately.
Burow's Graft Text: The defect edges were debeveled with a #15 scalpel blade. Given the location of the defect, shape of the defect, the proximity to free margins and the presence of a standing cone deformity a Burow's skin graft was deemed most appropriate. The standing cone was removed and this tissue was then trimmed to the shape of the primary defect. The adipose tissue was also removed until only dermis and epidermis were left.  The skin margins of the secondary defect were undermined to an appropriate distance in all directions utilizing iris scissors.  The secondary defect was closed with interrupted buried subcutaneous sutures.  The skin edges were then re-apposed with running  sutures.  The skin graft was then placed in the primary defect and oriented appropriately.
Cartilage Graft Text: The defect edges were debeveled with a #15 scalpel blade.  Given the location of the defect, shape of the defect, the fact the defect involved a full thickness cartilage defect a cartilage graft was deemed most appropriate.  An appropriate donor site was identified, cleansed, and anesthetized. The cartilage graft was then harvested and transferred to the recipient site, oriented appropriately and then sutured into place.  The secondary defect was then repaired using a primary closure.
Composite Graft Text: The defect edges were debeveled with a #15 scalpel blade.  Given the location of the defect, shape of the defect, the proximity to free margins and the fact the defect was full thickness a composite graft was deemed most appropriate.  The defect was outline and then transferred to the donor site.  A full thickness graft was then excised from the donor site. The graft was then placed in the primary defect, oriented appropriately and then sutured into place.  The secondary defect was then repaired using a primary closure.
Epidermal Autograft Text: The defect edges were debeveled with a #15 scalpel blade.  Given the location of the defect, shape of the defect and the proximity to free margins an epidermal autograft was deemed most appropriate.  Using a sterile surgical marker, the primary defect shape was transferred to the donor site. The epidermal graft was then harvested.  The skin graft was then placed in the primary defect and oriented appropriately.
Dermal Autograft Text: The defect edges were debeveled with a #15 scalpel blade.  Given the location of the defect, shape of the defect and the proximity to free margins a dermal autograft was deemed most appropriate.  Using a sterile surgical marker, the primary defect shape was transferred to the donor site. The area thus outlined was incised deep to adipose tissue with a #15 scalpel blade.  The harvested graft was then trimmed of adipose and epidermal tissue until only dermis was left.  The skin graft was then placed in the primary defect and oriented appropriately.
Skin Substitute Text: The defect edges were debeveled with a #15 scalpel blade.  Given the location of the defect, shape of the defect and the proximity to free margins a skin substitute graft was deemed most appropriate.  The graft material was trimmed to fit the size of the defect. The graft was then placed in the primary defect and oriented appropriately.
Tissue Cultured Epidermal Autograft Text: The defect edges were debeveled with a #15 scalpel blade.  Given the location of the defect, shape of the defect and the proximity to free margins a tissue cultured epidermal autograft was deemed most appropriate.  The graft was then trimmed to fit the size of the defect.  The graft was then placed in the primary defect and oriented appropriately.
Xenograft Text: The defect edges were debeveled with a #15 scalpel blade.  Given the location of the defect, shape of the defect and the proximity to free margins a xenograft was deemed most appropriate.  The graft was then trimmed to fit the size of the defect.  The graft was then placed in the primary defect and oriented appropriately.
Purse String (Intermediate) Text: Given the location of the defect and the characteristics of the surrounding skin a purse string intermediate closure was deemed most appropriate.  Undermining was performed circumfirentially around the surgical defect.  A purse string suture was then placed and tightened.
Purse String (Simple) Text: Given the location of the defect and the characteristics of the surrounding skin a purse string simple closure was deemed most appropriate.  Undermining was performed circumferentially around the surgical defect.  A purse string suture was then placed and tightened.
Partial Purse String (Intermediate) Text: Given the location of the defect and the characteristics of the surrounding skin an intermediate purse string closure was deemed most appropriate.  Undermining was performed circumferentially around the surgical defect.  A purse string suture was then placed and tightened. Wound tension of the circular defect prevented complete closure of the wound.
Partial Purse String (Simple) Text: Given the location of the defect and the characteristics of the surrounding skin a simple purse string closure was deemed most appropriate.  Undermining was performed circumferentially around the surgical defect.  A purse string suture was then placed and tightened. Wound tension of the circular defect prevented complete closure of the wound.
Complex Repair And Single Advancement Flap Text: The defect edges were debeveled with a #15 scalpel blade.  The primary defect was closed partially with a complex linear closure.  Given the location of the remaining defect, shape of the defect and the proximity to free margins a single advancement flap was deemed most appropriate for complete closure of the defect.  Using a sterile surgical marker, an appropriate advancement flap was drawn incorporating the defect and placing the expected incisions within the relaxed skin tension lines where possible.    The area thus outlined was incised deep to adipose tissue with a #15 scalpel blade.  The skin margins were undermined to an appropriate distance in all directions utilizing iris scissors.
Complex Repair And Double Advancement Flap Text: The defect edges were debeveled with a #15 scalpel blade.  The primary defect was closed partially with a complex linear closure.  Given the location of the remaining defect, shape of the defect and the proximity to free margins a double advancement flap was deemed most appropriate for complete closure of the defect.  Using a sterile surgical marker, an appropriate advancement flap was drawn incorporating the defect and placing the expected incisions within the relaxed skin tension lines where possible.    The area thus outlined was incised deep to adipose tissue with a #15 scalpel blade.  The skin margins were undermined to an appropriate distance in all directions utilizing iris scissors.
Complex Repair And Modified Advancement Flap Text: The defect edges were debeveled with a #15 scalpel blade.  The primary defect was closed partially with a complex linear closure.  Given the location of the remaining defect, shape of the defect and the proximity to free margins a modified advancement flap was deemed most appropriate for complete closure of the defect.  Using a sterile surgical marker, an appropriate advancement flap was drawn incorporating the defect and placing the expected incisions within the relaxed skin tension lines where possible.    The area thus outlined was incised deep to adipose tissue with a #15 scalpel blade.  The skin margins were undermined to an appropriate distance in all directions utilizing iris scissors.
Complex Repair And A-T Advancement Flap Text: The defect edges were debeveled with a #15 scalpel blade.  The primary defect was closed partially with a complex linear closure.  Given the location of the remaining defect, shape of the defect and the proximity to free margins an A-T advancement flap was deemed most appropriate for complete closure of the defect.  Using a sterile surgical marker, an appropriate advancement flap was drawn incorporating the defect and placing the expected incisions within the relaxed skin tension lines where possible.    The area thus outlined was incised deep to adipose tissue with a #15 scalpel blade.  The skin margins were undermined to an appropriate distance in all directions utilizing iris scissors.
Complex Repair And O-T Advancement Flap Text: The defect edges were debeveled with a #15 scalpel blade.  The primary defect was closed partially with a complex linear closure.  Given the location of the remaining defect, shape of the defect and the proximity to free margins an O-T advancement flap was deemed most appropriate for complete closure of the defect.  Using a sterile surgical marker, an appropriate advancement flap was drawn incorporating the defect and placing the expected incisions within the relaxed skin tension lines where possible.    The area thus outlined was incised deep to adipose tissue with a #15 scalpel blade.  The skin margins were undermined to an appropriate distance in all directions utilizing iris scissors.
Complex Repair And O-L Flap Text: The defect edges were debeveled with a #15 scalpel blade.  The primary defect was closed partially with a complex linear closure.  Given the location of the remaining defect, shape of the defect and the proximity to free margins an O-L flap was deemed most appropriate for complete closure of the defect.  Using a sterile surgical marker, an appropriate flap was drawn incorporating the defect and placing the expected incisions within the relaxed skin tension lines where possible.    The area thus outlined was incised deep to adipose tissue with a #15 scalpel blade.  The skin margins were undermined to an appropriate distance in all directions utilizing iris scissors.
Complex Repair And Bilobe Flap Text: The defect edges were debeveled with a #15 scalpel blade.  The primary defect was closed partially with a complex linear closure.  Given the location of the remaining defect, shape of the defect and the proximity to free margins a bilobe flap was deemed most appropriate for complete closure of the defect.  Using a sterile surgical marker, an appropriate advancement flap was drawn incorporating the defect and placing the expected incisions within the relaxed skin tension lines where possible.    The area thus outlined was incised deep to adipose tissue with a #15 scalpel blade.  The skin margins were undermined to an appropriate distance in all directions utilizing iris scissors.
Complex Repair And Melolabial Flap Text: The defect edges were debeveled with a #15 scalpel blade.  The primary defect was closed partially with a complex linear closure.  Given the location of the remaining defect, shape of the defect and the proximity to free margins a melolabial flap was deemed most appropriate for complete closure of the defect.  Using a sterile surgical marker, an appropriate advancement flap was drawn incorporating the defect and placing the expected incisions within the relaxed skin tension lines where possible.    The area thus outlined was incised deep to adipose tissue with a #15 scalpel blade.  The skin margins were undermined to an appropriate distance in all directions utilizing iris scissors.
Complex Repair And Rotation Flap Text: The defect edges were debeveled with a #15 scalpel blade.  The primary defect was closed partially with a complex linear closure.  Given the location of the remaining defect, shape of the defect and the proximity to free margins a rotation flap was deemed most appropriate for complete closure of the defect.  Using a sterile surgical marker, an appropriate advancement flap was drawn incorporating the defect and placing the expected incisions within the relaxed skin tension lines where possible.    The area thus outlined was incised deep to adipose tissue with a #15 scalpel blade.  The skin margins were undermined to an appropriate distance in all directions utilizing iris scissors.
Complex Repair And Rhombic Flap Text: The defect edges were debeveled with a #15 scalpel blade.  The primary defect was closed partially with a complex linear closure.  Given the location of the remaining defect, shape of the defect and the proximity to free margins a rhombic flap was deemed most appropriate for complete closure of the defect.  Using a sterile surgical marker, an appropriate advancement flap was drawn incorporating the defect and placing the expected incisions within the relaxed skin tension lines where possible.    The area thus outlined was incised deep to adipose tissue with a #15 scalpel blade.  The skin margins were undermined to an appropriate distance in all directions utilizing iris scissors.
Complex Repair And Transposition Flap Text: The defect edges were debeveled with a #15 scalpel blade.  The primary defect was closed partially with a complex linear closure.  Given the location of the remaining defect, shape of the defect and the proximity to free margins a transposition flap was deemed most appropriate for complete closure of the defect.  Using a sterile surgical marker, an appropriate advancement flap was drawn incorporating the defect and placing the expected incisions within the relaxed skin tension lines where possible.    The area thus outlined was incised deep to adipose tissue with a #15 scalpel blade.  The skin margins were undermined to an appropriate distance in all directions utilizing iris scissors.
Complex Repair And V-Y Plasty Text: The defect edges were debeveled with a #15 scalpel blade.  The primary defect was closed partially with a complex linear closure.  Given the location of the remaining defect, shape of the defect and the proximity to free margins a V-Y plasty was deemed most appropriate for complete closure of the defect.  Using a sterile surgical marker, an appropriate advancement flap was drawn incorporating the defect and placing the expected incisions within the relaxed skin tension lines where possible.    The area thus outlined was incised deep to adipose tissue with a #15 scalpel blade.  The skin margins were undermined to an appropriate distance in all directions utilizing iris scissors.
Complex Repair And M Plasty Text: The defect edges were debeveled with a #15 scalpel blade.  The primary defect was closed partially with a complex linear closure.  Given the location of the remaining defect, shape of the defect and the proximity to free margins an M plasty was deemed most appropriate for complete closure of the defect.  Using a sterile surgical marker, an appropriate advancement flap was drawn incorporating the defect and placing the expected incisions within the relaxed skin tension lines where possible.    The area thus outlined was incised deep to adipose tissue with a #15 scalpel blade.  The skin margins were undermined to an appropriate distance in all directions utilizing iris scissors.
Complex Repair And Double M Plasty Text: The defect edges were debeveled with a #15 scalpel blade.  The primary defect was closed partially with a complex linear closure.  Given the location of the remaining defect, shape of the defect and the proximity to free margins a double M plasty was deemed most appropriate for complete closure of the defect.  Using a sterile surgical marker, an appropriate advancement flap was drawn incorporating the defect and placing the expected incisions within the relaxed skin tension lines where possible.    The area thus outlined was incised deep to adipose tissue with a #15 scalpel blade.  The skin margins were undermined to an appropriate distance in all directions utilizing iris scissors.
Complex Repair And W Plasty Text: The defect edges were debeveled with a #15 scalpel blade.  The primary defect was closed partially with a complex linear closure.  Given the location of the remaining defect, shape of the defect and the proximity to free margins a W plasty was deemed most appropriate for complete closure of the defect.  Using a sterile surgical marker, an appropriate advancement flap was drawn incorporating the defect and placing the expected incisions within the relaxed skin tension lines where possible.    The area thus outlined was incised deep to adipose tissue with a #15 scalpel blade.  The skin margins were undermined to an appropriate distance in all directions utilizing iris scissors.
Complex Repair And Z Plasty Text: The defect edges were debeveled with a #15 scalpel blade.  The primary defect was closed partially with a complex linear closure.  Given the location of the remaining defect, shape of the defect and the proximity to free margins a Z plasty was deemed most appropriate for complete closure of the defect.  Using a sterile surgical marker, an appropriate advancement flap was drawn incorporating the defect and placing the expected incisions within the relaxed skin tension lines where possible.    The area thus outlined was incised deep to adipose tissue with a #15 scalpel blade.  The skin margins were undermined to an appropriate distance in all directions utilizing iris scissors.
Complex Repair And Dorsal Nasal Flap Text: The defect edges were debeveled with a #15 scalpel blade.  The primary defect was closed partially with a complex linear closure.  Given the location of the remaining defect, shape of the defect and the proximity to free margins a dorsal nasal flap was deemed most appropriate for complete closure of the defect.  Using a sterile surgical marker, an appropriate flap was drawn incorporating the defect and placing the expected incisions within the relaxed skin tension lines where possible.    The area thus outlined was incised deep to adipose tissue with a #15 scalpel blade.  The skin margins were undermined to an appropriate distance in all directions utilizing iris scissors.
Complex Repair And Ftsg Text: The defect edges were debeveled with a #15 scalpel blade.  The primary defect was closed partially with a complex linear closure.  Given the location of the defect, shape of the defect and the proximity to free margins a full thickness skin graft was deemed most appropriate to repair the remaining defect.  The graft was trimmed to fit the size of the remaining defect.  The graft was then placed in the primary defect, oriented appropriately, and sutured into place.
Complex Repair And Burow's Graft Text: The defect edges were debeveled with a #15 scalpel blade.  The primary defect was closed partially with a complex linear closure.  Given the location of the defect, shape of the defect, the proximity to free margins and the presence of a standing cone deformity a Burow's graft was deemed most appropriate to repair the remaining defect.  The graft was trimmed to fit the size of the remaining defect.  The graft was then placed in the primary defect, oriented appropriately, and sutured into place.
Complex Repair And Split-Thickness Skin Graft Text: The defect edges were debeveled with a #15 scalpel blade.  The primary defect was closed partially with a complex linear closure.  Given the location of the defect, shape of the defect and the proximity to free margins a split thickness skin graft was deemed most appropriate to repair the remaining defect.  The graft was trimmed to fit the size of the remaining defect.  The graft was then placed in the primary defect, oriented appropriately, and sutured into place.
Complex Repair And Epidermal Autograft Text: The defect edges were debeveled with a #15 scalpel blade.  The primary defect was closed partially with a complex linear closure.  Given the location of the defect, shape of the defect and the proximity to free margins an epidermal autograft was deemed most appropriate to repair the remaining defect.  The graft was trimmed to fit the size of the remaining defect.  The graft was then placed in the primary defect, oriented appropriately, and sutured into place.
Complex Repair And Dermal Autograft Text: The defect edges were debeveled with a #15 scalpel blade.  The primary defect was closed partially with a complex linear closure.  Given the location of the defect, shape of the defect and the proximity to free margins an dermal autograft was deemed most appropriate to repair the remaining defect.  The graft was trimmed to fit the size of the remaining defect.  The graft was then placed in the primary defect, oriented appropriately, and sutured into place.
Complex Repair And Tissue Cultured Epidermal Autograft Text: The defect edges were debeveled with a #15 scalpel blade.  The primary defect was closed partially with a complex linear closure.  Given the location of the defect, shape of the defect and the proximity to free margins an tissue cultured epidermal autograft was deemed most appropriate to repair the remaining defect.  The graft was trimmed to fit the size of the remaining defect.  The graft was then placed in the primary defect, oriented appropriately, and sutured into place.
Complex Repair And Xenograft Text: The defect edges were debeveled with a #15 scalpel blade.  The primary defect was closed partially with a complex linear closure.  Given the location of the defect, shape of the defect and the proximity to free margins a xenograft was deemed most appropriate to repair the remaining defect.  The graft was trimmed to fit the size of the remaining defect.  The graft was then placed in the primary defect, oriented appropriately, and sutured into place.
Complex Repair And Skin Substitute Graft Text: The defect edges were debeveled with a #15 scalpel blade.  The primary defect was closed partially with a complex linear closure.  Given the location of the remaining defect, shape of the defect and the proximity to free margins a skin substitute graft was deemed most appropriate to repair the remaining defect.  The graft was trimmed to fit the size of the remaining defect.  The graft was then placed in the primary defect, oriented appropriately, and sutured into place.
Path Notes (To The Dermatopathologist): Please check margins. Nicked  at 3 o’clock
Consent was obtained from the patient. The risks and benefits to therapy were discussed in detail. Specifically, the risks of infection, scarring, bleeding, prolonged wound healing, incomplete removal, allergy to anesthesia, nerve injury and recurrence were addressed. Prior to the procedure, the treatment site was clearly identified and confirmed by the patient. All components of Universal Protocol/PAUSE Rule completed.
Render Post-Care Instructions In Note?: yes
Post-Care Instructions: I reviewed with the patient in detail post-care instructions. Patient is not to engage in any heavy lifting, exercise, or swimming for the next 14 days. Should the patient develop any fevers, chills, bleeding, severe pain patient will contact the office immediately.
Home Suture Removal Text: Patient was provided a home suture removal kit and will remove their sutures at home.  If they have any questions or difficulties they will call the office.
Where Do You Want The Question To Include Opioid Counseling Located?: Case Summary Tab
Information: Selecting Yes will display possible errors in your note based on the variables you have selected. This validation is only offered as a suggestion for you. PLEASE NOTE THAT THE VALIDATION TEXT WILL BE REMOVED WHEN YOU FINALIZE YOUR NOTE. IF YOU WANT TO FAX A PRELIMINARY NOTE YOU WILL NEED TO TOGGLE THIS TO 'NO' IF YOU DO NOT WANT IT IN YOUR FAXED NOTE.

## 2024-07-17 ENCOUNTER — APPOINTMENT (RX ONLY)
Dept: URBAN - METROPOLITAN AREA CLINIC 150 | Facility: CLINIC | Age: 79
Setting detail: DERMATOLOGY
End: 2024-07-17

## 2024-07-17 VITALS — DIASTOLIC BLOOD PRESSURE: 78 MMHG | HEART RATE: 93 BPM | SYSTOLIC BLOOD PRESSURE: 156 MMHG | OXYGEN SATURATION: 100 %

## 2024-07-17 PROBLEM — C44.41 BASAL CELL CARCINOMA OF SKIN OF SCALP AND NECK: Status: ACTIVE | Noted: 2024-07-17

## 2024-07-17 PROCEDURE — 13131 CMPLX RPR F/C/C/M/N/AX/G/H/F: CPT | Mod: 79

## 2024-07-17 PROCEDURE — ? COUNSELING

## 2024-07-17 PROCEDURE — 17311 MOHS 1 STAGE H/N/HF/G: CPT | Mod: 79

## 2024-07-17 PROCEDURE — 17312 MOHS ADDL STAGE: CPT | Mod: 79

## 2024-07-17 PROCEDURE — ? MOHS SURGERY

## 2024-07-17 NOTE — PROCEDURE: MOHS SURGERY
Mohs Case Number: [id number]
Date Of Previous Biopsy (Optional): 06/18/2024
Previous Accession (Optional): [id number]
Biopsy Photograph Reviewed: Yes
Consent Type: Consent 1 (Standard)
Eye Shield Used: No
Surgeon Performing Repair (Optional): Warner Iglesias MD
Initial Size Of Lesion: 0.9
X Size Of Lesion In Cm (Optional): 0
Number Of Stages: 1
Primary Defect Length In Cm (Final Defect Size - Required For Flaps/Grafts): 1.1
Primary Defect Width In Cm (Final Defect Size - Required For Flaps/Grafts): 0.8
Repair Type: Complex Repair
Which Instrument Did You Use For Dermabrasion?: Wire Brush
Which Eyelid Repair Cpt Are You Using?: 44420
Oculoplastic Surgeon Procedure Text (A): After obtaining clear surgical margins the patient was sent to oculoplastics for surgical repair.  The patient understands they will receive post-surgical care and follow-up from the referring physician's office.
Otolaryngologist Procedure Text (A): After obtaining clear surgical margins the patient was sent to otolaryngology for surgical repair.  The patient understands they will receive post-surgical care and follow-up from the referring physician's office.
Plastic Surgeon Procedure Text (A): After obtaining clear surgical margins the patient was sent to plastics for surgical repair.  The patient understands they will receive post-surgical care and follow-up from the referring physician's office.
Mid-Level Procedure Text (A): After obtaining clear surgical margins the patient was sent to a mid-level provider for surgical repair.  The patient understands they will receive post-surgical care and follow-up from the mid-level provider.
Provider Procedure Text (A): After obtaining clear surgical margins the defect was repaired by another provider.
Asc Procedure Text (A): After obtaining clear surgical margins the patient was sent to an ASC for surgical repair.  The patient understands they will receive post-surgical care and follow-up from the ASC physician.
Simple / Intermediate / Complex Repair - Final Wound Length In Cm: 2.5
Suturegard Retention Suture: 2-0 Nylon
Retention Suture Bite Size: 3 mm
Length To Time In Minutes Device Was In Place: 10
Width Of Defect Perpendicular To Closure In Cm (Required): 0.7
Distance Of Undermining In Cm (Required): 1.6
Undermining Type: Entire Wound
Debridement Text: The wound edges were debrided prior to proceeding with the closure to facilitate wound healing.
Helical Rim Text: The closure involved the helical rim.
Vermilion Border Text: The closure involved the vermilion border.
Nostril Rim Text: The closure involved the nostril rim.
Retention Suture Text: Retention sutures were placed to support the closure and prevent dehiscence.
Location Indication Override (Is Already Calculated Based On Selected Body Location): Area M
Area H Indication Text: Tumors in this location are included in Area H (eyelids, eyebrows, nose, lips, chin, ear, pre-auricular, post-auricular, temple, genitalia, hands, feet, ankles and areola).  Tissue conservation is critical in these anatomic locations.
Area M Indication Text: Tumors in this location are included in Area M (cheek, forehead, scalp, neck, jawline and pretibial skin).  Mohs surgery is indicated for tumors in these anatomic locations.
Area L Indication Text: Tumors in this location are included in Area L (trunk and extremities).  Mohs surgery is indicated for larger tumors, or tumors with aggressive histologic features, in these anatomic locations.
Tumor Debulked?: curette
Depth Of Tumor Invasion (For Histology): adipose tissue
Perineural Invasion (For Histology - Be Specific If Possible): absent
Special Stains Stage 1 - Results: Base On Clearance Noted Above
Stage 2: Additional Anesthesia Type: 1% lidocaine with epinephrine
Staging Info: By selecting yes to the question above you will include information on AJCC 8 tumor staging in your Mohs note. Information on tumor staging will be automatically added for SCCs on the head and neck. AJCC 8 includes tumor size, tumor depth, perineural involvement and bone invasion.
Tumor Depth: Less than 6mm from granular layer and no invasion beyond the subcutaneous fat
Was The Patient On Physician Recommended Anticoagulation Therapy?: Please Select the Appropriate Response
Medical Necessity Statement: Based on my medical judgement, Mohs surgery is the most appropriate treatment for this cancer compared to other treatments.
Alternatives Discussed Intro (Do Not Add Period): I discussed alternative treatments to Mohs surgery and specifically discussed the risks and benefits of
Consent 1/Introductory Paragraph: The rationale for Mohs was explained to the patient and consent was obtained. The risks, benefits and alternatives to therapy were discussed in detail. Specifically, the risks of infection, scarring, bleeding, prolonged wound healing, incomplete removal, allergy to anesthesia, nerve injury and recurrence were addressed. Prior to the procedure, the treatment site was clearly identified and confirmed by the patient. All components of Universal Protocol/PAUSE Rule completed.
Consent 2/Introductory Paragraph: Mohs surgery was explained to the patient and consent was obtained. The risks, benefits and alternatives to therapy were discussed in detail. Specifically, the risks of infection, scarring, bleeding, prolonged wound healing, incomplete removal, allergy to anesthesia, nerve injury and recurrence were addressed. Prior to the procedure, the treatment site was clearly identified and confirmed by the patient. All components of Universal Protocol/PAUSE Rule completed.
Consent 3/Introductory Paragraph: I gave the patient a chance to ask questions they had about the procedure.  Following this I explained the Mohs procedure and consent was obtained. The risks, benefits and alternatives to therapy were discussed in detail. Specifically, the risks of infection, scarring, bleeding, prolonged wound healing, incomplete removal, allergy to anesthesia, nerve injury and recurrence were addressed. Prior to the procedure, the treatment site was clearly identified and confirmed by the patient. All components of Universal Protocol/PAUSE Rule completed.
Consent (Temporal Branch)/Introductory Paragraph: The rationale for Mohs was explained to the patient and consent was obtained. The risks, benefits and alternatives to therapy were discussed in detail. Specifically, the risks of damage to the temporal branch of the facial nerve, infection, scarring, bleeding, prolonged wound healing, incomplete removal, allergy to anesthesia, and recurrence were addressed. Prior to the procedure, the treatment site was clearly identified and confirmed by the patient. All components of Universal Protocol/PAUSE Rule completed.
Consent (Marginal Mandibular)/Introductory Paragraph: The rationale for Mohs was explained to the patient and consent was obtained. The risks, benefits and alternatives to therapy were discussed in detail. Specifically, the risks of damage to the marginal mandibular branch of the facial nerve, infection, scarring, bleeding, prolonged wound healing, incomplete removal, allergy to anesthesia, and recurrence were addressed. Prior to the procedure, the treatment site was clearly identified and confirmed by the patient. All components of Universal Protocol/PAUSE Rule completed.
Consent (Spinal Accessory)/Introductory Paragraph: The rationale for Mohs was explained to the patient and consent was obtained. The risks, benefits and alternatives to therapy were discussed in detail. Specifically, the risks of damage to the spinal accessory nerve, infection, scarring, bleeding, prolonged wound healing, incomplete removal, allergy to anesthesia, and recurrence were addressed. Prior to the procedure, the treatment site was clearly identified and confirmed by the patient. All components of Universal Protocol/PAUSE Rule completed.
Consent (Near Eyelid Margin)/Introductory Paragraph: The rationale for Mohs was explained to the patient and consent was obtained. The risks, benefits and alternatives to therapy were discussed in detail. Specifically, the risks of ectropion or eyelid deformity, infection, scarring, bleeding, prolonged wound healing, incomplete removal, allergy to anesthesia, nerve injury and recurrence were addressed. Prior to the procedure, the treatment site was clearly identified and confirmed by the patient. All components of Universal Protocol/PAUSE Rule completed.
Consent (Ear)/Introductory Paragraph: The rationale for Mohs was explained to the patient and consent was obtained. The risks, benefits and alternatives to therapy were discussed in detail. Specifically, the risks of ear deformity, infection, scarring, bleeding, prolonged wound healing, incomplete removal, allergy to anesthesia, nerve injury and recurrence were addressed. Prior to the procedure, the treatment site was clearly identified and confirmed by the patient. All components of Universal Protocol/PAUSE Rule completed.
Consent (Nose)/Introductory Paragraph: The rationale for Mohs was explained to the patient and consent was obtained. The risks, benefits and alternatives to therapy were discussed in detail. Specifically, the risks of nasal deformity, changes in the flow of air through the nose, infection, scarring, bleeding, prolonged wound healing, incomplete removal, allergy to anesthesia, nerve injury and recurrence were addressed. Prior to the procedure, the treatment site was clearly identified and confirmed by the patient. All components of Universal Protocol/PAUSE Rule completed.
Consent (Lip)/Introductory Paragraph: The rationale for Mohs was explained to the patient and consent was obtained. The risks, benefits and alternatives to therapy were discussed in detail. Specifically, the risks of lip deformity, changes in the oral aperture, infection, scarring, bleeding, prolonged wound healing, incomplete removal, allergy to anesthesia, nerve injury and recurrence were addressed. Prior to the procedure, the treatment site was clearly identified and confirmed by the patient. All components of Universal Protocol/PAUSE Rule completed.
Consent (Scalp)/Introductory Paragraph: The rationale for Mohs was explained to the patient and consent was obtained. The risks, benefits and alternatives to therapy were discussed in detail. Specifically, the risks of changes in hair growth pattern secondary to repair, infection, scarring, bleeding, prolonged wound healing, incomplete removal, allergy to anesthesia, nerve injury and recurrence were addressed. Prior to the procedure, the treatment site was clearly identified and confirmed by the patient. All components of Universal Protocol/PAUSE Rule completed.
Detail Level: Detailed
Postop Diagnosis: same
Anesthesia Volume In Cc: 6
Hemostasis: Electrocautery
Estimated Blood Loss (Cc): minimal
Anesthesia Volume In Cc: 2
Brow Lift Text: A midfrontal incision was made medially to the defect to allow access to the tissues just superior to the left eyebrow. Following careful dissection inferiorly in a supraperiosteal plane to the level of the left eyebrow, several 3-0 monocryl sutures were used to resuspend the eyebrow orbicularis oculi muscular unit to the superior frontal bone periosteum. This resulted in an appropriate reapproximation of static eyebrow symmetry and correction of the left brow ptosis.
Deep Sutures: 4-0 Vicryl
Epidermal Sutures: 4-0 Ethilon
Epidermal Closure: simple interrupted
Suturegard Intro: Intraoperative tissue expansion was performed, utilizing the SUTUREGARD device, in order to reduce wound tension.
Suturegard Body: The suture ends were repeatedly re-tightened and re-clamped to achieve the desired tissue expansion.
Hemigard Intro: Due to skin fragility and wound tension, it was decided to use HEMIGARD adhesive retention suture devices to permit a linear closure. The skin was cleaned and dried for a 6cm distance away from the wound. Excessive hair, if present, was removed to allow for adhesion.
Hemigard Postcare Instructions: The HEMIGARD strips are to remain completely dry for at least 5-7 days.
Donor Site Anesthesia Type: same as repair anesthesia
Graft Donor Site Bandage (Optional-Leave Blank If You Don't Want In Note): Steri-strips and a pressure bandage were applied to the donor site.
Closure 2 Information: This tab is for additional flaps and grafts, including complex repair and grafts and complex repair and flaps. You can also specify a different location for the additional defect, if the location is the same you do not need to select a new one. We will insert the automated text for the repair you select below just as we do for solitary flaps and grafts. Please note that at this time if you select a location with a different insurance zone you will need to override the ICD10 and CPT if appropriate.
Closure 3 Information: This tab is for additional flaps and grafts above and beyond our usual structured repairs.  Please note if you enter information here it will not currently bill and you will need to add the billing information manually.
Wound Care: Petrolatum
Dressing: dry sterile dressing
Suture Removal: 10 days
Unna Boot Text: An Unna boot was placed to help immobilize the limb and facilitate more rapid healing.
Home Suture Removal Text: Patient was provided instructions on removing sutures and will remove their sutures at home.  If they have any questions or difficulties they will call the office.
Post-Care Instructions: I reviewed with the patient in detail post-care instructions. Patient is not to engage in any heavy lifting, exercise, or swimming for the next 10 days. Should the patient develop any fevers, chills, bleeding, severe pain patient will contact the office immediately.
Pain Refusal Text: I offered to prescribe pain medication but the patient refused to take this medication.
Mauc Instructions: By selecting yes to the question below the MAUC number will be added into the note.  This will be calculated automatically based on the diagnosis chosen, the size entered, the body zone selected (H,M,L) and the specific indications you chose. You will also have the option to override the Mohs AUC if you disagree with the automatically calculated number and this option is found in the Case Summary tab.
Where Do You Want The Question To Include Opioid Counseling Located?: Case Summary Tab
Eye Protection Verbiage: Before proceeding with the stage, a plastic scleral shield was inserted. The globe was anesthetized with a few drops of 1% lidocaine with 1:100,000 epinephrine. Then, an appropriate sized scleral shield was chosen and coated with lacrilube ointment. The shield was gently inserted and left in place for the duration of each stage. After the stage was completed, the shield was gently removed.
Mohs Method Verbiage: An incision at a 45 degree angle following the standard Mohs approach was done and the specimen was harvested as a microscopic controlled layer.
Surgeon/Pathologist Verbiage (Will Incorporate Name Of Surgeon From Intro If Not Blank): operated in two distinct and integrated capacities as the surgeon and pathologist.
Mohs Histo Method Verbiage: Each section was then chromacoded and processed in the Mohs lab using the Mohs protocol and submitted for frozen section.
Subsequent Stages Histo Method Verbiage: Using a similar technique to that described above, a thin layer of tissue was removed from all areas where tumor was visible on the previous stage.  The tissue was again oriented, mapped, dyed, and processed as above.
Mohs Rapid Report Verbiage: The area of clinically evident tumor was marked with skin marking ink and appropriately hatched.  The initial incision was made following the Mohs approach through the skin.  The specimen was taken to the lab, divided into the necessary number of pieces, chromacoded and processed according to the Mohs protocol.  This was repeated in successive stages until a tumor free defect was achieved.
Complex Repair Preamble Text (Leave Blank If You Do Not Want): Extensive wide undermining was performed.
Intermediate Repair Preamble Text (Leave Blank If You Do Not Want): Undermining was performed with blunt dissection.
Non-Graft Cartilage Fenestration Text: The cartilage was fenestrated with a 2mm punch biopsy to help facilitate healing.
Graft Cartilage Fenestration Text: The cartilage was fenestrated with a 2mm punch biopsy to help facilitate graft survival and healing.
Secondary Intention Text (Leave Blank If You Do Not Want): The defect will heal with secondary intention.
No Repair - Repaired With Adjacent Surgical Defect Text (Leave Blank If You Do Not Want): After obtaining clear surgical margins the defect was repaired concurrently with another surgical defect which was in close approximation.
Adjacent Tissue Transfer Text: The defect edges were debeveled with a #15 scalpel blade. Given the location of the defect and the proximity to free margins an adjacent tissue transfer was deemed most appropriate. Using a sterile surgical marker, an appropriate flap was drawn incorporating the defect and placing the expected incisions within the relaxed skin tension lines where possible. The area thus outlined was incised deep to adipose tissue with a #15 scalpel blade. The skin margins were undermined to an appropriate distance in all directions utilizing iris scissors and carried over to close the primary defect.
Advancement Flap (Single) Text: The defect edges were debeveled with a #15 scalpel blade.  Given the location of the defect and the proximity to free margins a single advancement flap was deemed most appropriate.  Using a sterile surgical marker, an appropriate advancement flap was drawn incorporating the defect and placing the expected incisions within the relaxed skin tension lines where possible.    The area thus outlined was incised deep to adipose tissue with a #15 scalpel blade.  The skin margins were undermined to an appropriate distance in all directions utilizing iris scissors.
Advancement Flap (Double) Text: The defect edges were debeveled with a #15 scalpel blade.  Given the location of the defect and the proximity to free margins a double advancement flap was deemed most appropriate.  Using a sterile surgical marker, the appropriate advancement flaps were drawn incorporating the defect and placing the expected incisions within the relaxed skin tension lines where possible.    The area thus outlined was incised deep to adipose tissue with a #15 scalpel blade.  The skin margins were undermined to an appropriate distance in all directions utilizing iris scissors.
Burow's Advancement Flap Text: The defect edges were debeveled with a #15 scalpel blade.  Given the location of the defect and the proximity to free margins a Burow's advancement flap was deemed most appropriate.  Using a sterile surgical marker, the appropriate advancement flap was drawn incorporating the defect and placing the expected incisions within the relaxed skin tension lines where possible.    The area thus outlined was incised deep to adipose tissue with a #15 scalpel blade.  The skin margins were undermined to an appropriate distance in all directions utilizing iris scissors.
Chonodrocutaneous Helical Advancement Flap Text: The defect edges were debeveled with a #15 scalpel blade. Given the location of the defect and the proximity to free margins a chondrocutaneous helical advancement flap was deemed most appropriate. Using a sterile surgical marker, the appropriate advancement flap was drawn incorporating the defect and placing the expected incisions within the relaxed skin tension lines where possible. The area thus outlined was incised deep to adipose tissue with a #15 scalpel blade. The skin margins were undermined to an appropriate distance in all directions utilizing iris scissors. Following this, the designed flap was advanced and carried over into the primary defect and sutured into place.
Crescentic Advancement Flap Text: The defect edges were debeveled with a #15 scalpel blade.  Given the location of the defect and the proximity to free margins a crescentic advancement flap was deemed most appropriate.  Using a sterile surgical marker, the appropriate advancement flap was drawn incorporating the defect and placing the expected incisions within the relaxed skin tension lines where possible.    The area thus outlined was incised deep to adipose tissue with a #15 scalpel blade.  The skin margins were undermined to an appropriate distance in all directions utilizing iris scissors.
A-T Advancement Flap Text: The defect edges were debeveled with a #15 scalpel blade.  Given the location of the defect, shape of the defect and the proximity to free margins an A-T advancement flap was deemed most appropriate.  Using a sterile surgical marker, an appropriate advancement flap was drawn incorporating the defect and placing the expected incisions within the relaxed skin tension lines where possible.    The area thus outlined was incised deep to adipose tissue with a #15 scalpel blade.  The skin margins were undermined to an appropriate distance in all directions utilizing iris scissors.
O-T Advancement Flap Text: The defect edges were debeveled with a #15 scalpel blade.  Given the location of the defect, shape of the defect and the proximity to free margins an O-T advancement flap was deemed most appropriate.  Using a sterile surgical marker, an appropriate advancement flap was drawn incorporating the defect and placing the expected incisions within the relaxed skin tension lines where possible.    The area thus outlined was incised deep to adipose tissue with a #15 scalpel blade.  The skin margins were undermined to an appropriate distance in all directions utilizing iris scissors.
O-L Flap Text: The defect edges were debeveled with a #15 scalpel blade.  Given the location of the defect, shape of the defect and the proximity to free margins an O-L flap was deemed most appropriate.  Using a sterile surgical marker, an appropriate advancement flap was drawn incorporating the defect and placing the expected incisions within the relaxed skin tension lines where possible.    The area thus outlined was incised deep to adipose tissue with a #15 scalpel blade.  The skin margins were undermined to an appropriate distance in all directions utilizing iris scissors.
O-Z Flap Text: The defect edges were debeveled with a #15 scalpel blade. Given the location of the defect, shape of the defect and the proximity to free margins an O-Z flap was deemed most appropriate. Using a sterile surgical marker, an appropriate transposition flap was drawn incorporating the defect and placing the expected incisions within the relaxed skin tension lines where possible. The area thus outlined was incised deep to adipose tissue with a #15 scalpel blade. The skin margins were undermined to an appropriate distance in all directions utilizing iris scissors. Following this, the designed flap was carried over into the primary defect and sutured into place.
Double O-Z Flap Text: The defect edges were debeveled with a #15 scalpel blade. Given the location of the defect, shape of the defect and the proximity to free margins a Double O-Z flap was deemed most appropriate. Using a sterile surgical marker, an appropriate transposition flap was drawn incorporating the defect and placing the expected incisions within the relaxed skin tension lines where possible. The area thus outlined was incised deep to adipose tissue with a #15 scalpel blade. The skin margins were undermined to an appropriate distance in all directions utilizing iris scissors. Following this, the designed flap was carried over into the primary defect and sutured into place.
V-Y Flap Text: The defect edges were debeveled with a #15 scalpel blade.  Given the location of the defect, shape of the defect and the proximity to free margins a V-Y flap was deemed most appropriate.  Using a sterile surgical marker, an appropriate advancement flap was drawn incorporating the defect and placing the expected incisions within the relaxed skin tension lines where possible.    The area thus outlined was incised deep to adipose tissue with a #15 scalpel blade.  The skin margins were undermined to an appropriate distance in all directions utilizing iris scissors.
Advancement-Rotation Flap Text: The defect edges were debeveled with a #15 scalpel blade.  Given the location of the defect, shape of the defect and the proximity to free margins an advancement-rotation flap was deemed most appropriate.  Using a sterile surgical marker, an appropriate flap was drawn incorporating the defect and placing the expected incisions within the relaxed skin tension lines where possible. The area thus outlined was incised deep to adipose tissue with a #15 scalpel blade.  The skin margins were undermined to an appropriate distance in all directions utilizing iris scissors.
Mercedes Flap Text: The defect edges were debeveled with a #15 scalpel blade.  Given the location of the defect, shape of the defect and the proximity to free margins a Mercedes flap was deemed most appropriate.  Using a sterile surgical marker, an appropriate advancement flap was drawn incorporating the defect and placing the expected incisions within the relaxed skin tension lines where possible. The area thus outlined was incised deep to adipose tissue with a #15 scalpel blade.  The skin margins were undermined to an appropriate distance in all directions utilizing iris scissors.
Modified Advancement Flap Text: The defect edges were debeveled with a #15 scalpel blade.  Given the location of the defect, shape of the defect and the proximity to free margins a modified advancement flap was deemed most appropriate.  Using a sterile surgical marker, an appropriate advancement flap was drawn incorporating the defect and placing the expected incisions within the relaxed skin tension lines where possible.    The area thus outlined was incised deep to adipose tissue with a #15 scalpel blade.  The skin margins were undermined to an appropriate distance in all directions utilizing iris scissors.
Mucosal Advancement Flap Text: Given the location of the defect, shape of the defect and the proximity to free margins a mucosal advancement flap was deemed most appropriate. Incisions were made with a 15 blade scalpel in the appropriate fashion along the cutaneous vermilion border and the mucosal lip. The remaining actinically damaged mucosal tissue was excised.  The mucosal advancement flap was then elevated to the gingival sulcus with care taken to preserve the neurovascular structures and advanced into the primary defect. Care was taken to ensure that precise realignment of the vermilion border was achieved.
Peng Advancement Flap Text: The defect edges were debeveled with a #15 scalpel blade. Given the location of the defect, shape of the defect and the proximity to free margins a Peng advancement flap was deemed most appropriate. Using a sterile surgical marker, an appropriate advancement flap was drawn incorporating the defect and placing the expected incisions within the relaxed skin tension lines where possible. The area thus outlined was incised deep to adipose tissue with a #15 scalpel blade. The skin margins were undermined to an appropriate distance in all directions utilizing iris scissors. Following this, the designed flap was advanced and carried over into the primary defect and sutured into place.
Hatchet Flap Text: The defect edges were debeveled with a #15 scalpel blade.  Given the location of the defect, shape of the defect and the proximity to free margins a hatchet flap was deemed most appropriate.  Using a sterile surgical marker, an appropriate hatchet flap was drawn incorporating the defect and placing the expected incisions within the relaxed skin tension lines where possible.    The area thus outlined was incised deep to adipose tissue with a #15 scalpel blade.  The skin margins were undermined to an appropriate distance in all directions utilizing iris scissors.
Rotation Flap Text: The defect edges were debeveled with a #15 scalpel blade.  Given the location of the defect, shape of the defect and the proximity to free margins a rotation flap was deemed most appropriate.  Using a sterile surgical marker, an appropriate rotation flap was drawn incorporating the defect and placing the expected incisions within the relaxed skin tension lines where possible.    The area thus outlined was incised deep to adipose tissue with a #15 scalpel blade.  The skin margins were undermined to an appropriate distance in all directions utilizing iris scissors.
Bilateral Rotation Flap Text: The defect edges were debeveled with a #15 scalpel blade. Given the location of the defect, shape of the defect and the proximity to free margins a bilateral rotation flap was deemed most appropriate. Using a sterile surgical marker, an appropriate rotation flap was drawn incorporating the defect and placing the expected incisions within the relaxed skin tension lines where possible. The area thus outlined was incised deep to adipose tissue with a #15 scalpel blade. The skin margins were undermined to an appropriate distance in all directions utilizing iris scissors. Following this, the designed flap was carried over into the primary defect and sutured into place.
Spiral Flap Text: The defect edges were debeveled with a #15 scalpel blade.  Given the location of the defect, shape of the defect and the proximity to free margins a spiral flap was deemed most appropriate.  Using a sterile surgical marker, an appropriate rotation flap was drawn incorporating the defect and placing the expected incisions within the relaxed skin tension lines where possible. The area thus outlined was incised deep to adipose tissue with a #15 scalpel blade.  The skin margins were undermined to an appropriate distance in all directions utilizing iris scissors.
Staged Advancement Flap Text: The defect edges were debeveled with a #15 scalpel blade. Given the location of the defect, shape of the defect and the proximity to free margins a staged advancement flap was deemed most appropriate. Using a sterile surgical marker, an appropriate advancement flap was drawn incorporating the defect and placing the expected incisions within the relaxed skin tension lines where possible. The area thus outlined was incised deep to adipose tissue with a #15 scalpel blade. The skin margins were undermined to an appropriate distance in all directions utilizing iris scissors. Following this, the designed flap was carried over into the primary defect and sutured into place.
Star Wedge Flap Text: The defect edges were debeveled with a #15 scalpel blade.  Given the location of the defect, shape of the defect and the proximity to free margins a star wedge flap was deemed most appropriate.  Using a sterile surgical marker, an appropriate rotation flap was drawn incorporating the defect and placing the expected incisions within the relaxed skin tension lines where possible. The area thus outlined was incised deep to adipose tissue with a #15 scalpel blade.  The skin margins were undermined to an appropriate distance in all directions utilizing iris scissors.
Transposition Flap Text: The defect edges were debeveled with a #15 scalpel blade.  Given the location of the defect and the proximity to free margins a transposition flap was deemed most appropriate.  Using a sterile surgical marker, an appropriate transposition flap was drawn incorporating the defect.    The area thus outlined was incised deep to adipose tissue with a #15 scalpel blade.  The skin margins were undermined to an appropriate distance in all directions utilizing iris scissors.
Muscle Hinge Flap Text: The defect edges were debeveled with a #15 scalpel blade.  Given the size, depth and location of the defect and the proximity to free margins a muscle hinge flap was deemed most appropriate.  Using a sterile surgical marker, an appropriate hinge flap was drawn incorporating the defect. The area thus outlined was incised with a #15 scalpel blade.  The skin margins were undermined to an appropriate distance in all directions utilizing iris scissors.
Mustarde Flap Text: The defect edges were debeveled with a #15 scalpel blade.  Given the size, depth and location of the defect and the proximity to free margins a Mustarde flap was deemed most appropriate. Using a sterile surgical marker, an appropriate flap was drawn incorporating the defect. The area thus outlined was incised with a #15 scalpel blade. The skin margins were undermined to an appropriate distance in all directions utilizing iris scissors. Following this, the designed flap was carried into the primary defect and sutured into place.
Nasal Turnover Hinge Flap Text: The defect edges were debeveled with a #15 scalpel blade.  Given the size, depth, location of the defect and the defect being full thickness a nasal turnover hinge flap was deemed most appropriate. Using a sterile surgical marker, an appropriate hinge flap was drawn incorporating the defect. The area thus outlined was incised with a #15 scalpel blade. The flap was designed to recreate the nasal mucosal lining and the alar rim. The skin margins were undermined to an appropriate distance in all directions utilizing iris scissors. Following this, the designed flap was carried over into the primary defect and sutured into place
Nasalis-Muscle-Based Myocutaneous Island Pedicle Flap Text: Using a #15 blade, an incision was made around the donor flap to the level of the nasalis muscle. Wide lateral undermining was then performed in both the subcutaneous plane above the nasalis muscle, and in a submuscular plane just above periosteum. This allowed the formation of a free nasalis muscle axial pedicle (based on the angular artery) which was still attached to the actual cutaneous flap, increasing its mobility and vascular viability. Hemostasis was obtained with pinpoint electrocoagulation. The flap was mobilized into position and the pivotal anchor points positioned and stabilized with buried interrupted sutures. Subcutaneous and dermal tissues were closed in a multilayered fashion with sutures. Tissue redundancies were excised, and the epidermal edges were apposed without significant tension and sutured with sutures.
Nasalis Myocutaneous Flap Text: Using a #15 blade, an incision was made around the donor flap to the level of the nasalis muscle. Wide lateral undermining was then performed in both the subcutaneous plane above the nasalis muscle, and in a submuscular plane just above periosteum. This allowed the formation of a free nasalis muscle axial pedicle which was still attached to the actual cutaneous flap, increasing its mobility and vascular viability. Hemostasis was obtained with pinpoint electrocoagulation. The flap was mobilized into position and the pivotal anchor points positioned and stabilized with buried interrupted sutures. Subcutaneous and dermal tissues were closed in a multilayered fashion with sutures. Tissue redundancies were excised, and the epidermal edges were apposed without significant tension and sutured with sutures.
Nasolabial Transposition Flap Text: The defect edges were debeveled with a #15 scalpel blade.  Given the size, depth and location of the defect and the proximity to free margins a nasolabial transposition flap was deemed most appropriate. Using a sterile surgical marker, an appropriate flap was drawn incorporating the defect. The area thus outlined was incised with a #15 scalpel blade. The skin margins were undermined to an appropriate distance in all directions utilizing iris scissors. Following this, the designed flap was carried into the primary defect and sutured into place.
Orbicularis Oris Muscle Flap Text: The defect edges were debeveled with a #15 scalpel blade.  Given that the defect affected the competency of the oral sphincter an orbicularis oris muscle flap was deemed most appropriate to restore this competency and normal muscle function.  Using a sterile surgical marker, an appropriate flap was drawn incorporating the defect. The area thus outlined was incised with a #15 scalpel blade. Following this, the designed flap was carried over into the primary defect and sutured into place.
Melolabial Transposition Flap Text: The defect edges were debeveled with a #15 scalpel blade.  Given the location of the defect and the proximity to free margins a melolabial flap was deemed most appropriate.  Using a sterile surgical marker, an appropriate melolabial transposition flap was drawn incorporating the defect.    The area thus outlined was incised deep to adipose tissue with a #15 scalpel blade.  The skin margins were undermined to an appropriate distance in all directions utilizing iris scissors.
Rectangular Flap Text: The defect edges were debeveled with a #15 scalpel blade. Given the location of the defect and the proximity to free margins a rectangular flap was deemed most appropriate. Using a sterile surgical marker, an appropriate rectangular flap was drawn incorporating the defect. The area thus outlined was incised deep to adipose tissue with a #15 scalpel blade. The skin margins were undermined to an appropriate distance in all directions utilizing iris scissors. Following this, the designed flap was carried over into the primary defect and sutured into place.
Rhombic Flap Text: The defect edges were debeveled with a #15 scalpel blade.  Given the location of the defect and the proximity to free margins a rhombic flap was deemed most appropriate.  Using a sterile surgical marker, an appropriate rhombic flap was drawn incorporating the defect.    The area thus outlined was incised deep to adipose tissue with a #15 scalpel blade.  The skin margins were undermined to an appropriate distance in all directions utilizing iris scissors.
Rhomboid Transposition Flap Text: The defect edges were debeveled with a #15 scalpel blade. Given the location of the defect and the proximity to free margins a rhomboid transposition flap was deemed most appropriate. Using a sterile surgical marker, an appropriate rhomboid flap was drawn incorporating the defect. The area thus outlined was incised deep to adipose tissue with a #15 scalpel blade. The skin margins were undermined to an appropriate distance in all directions utilizing iris scissors. Following this, the designed flap was carried over into the primary defect and sutured into place.
Bi-Rhombic Flap Text: The defect edges were debeveled with a #15 scalpel blade.  Given the location of the defect and the proximity to free margins a bi-rhombic flap was deemed most appropriate.  Using a sterile surgical marker, an appropriate rhombic flap was drawn incorporating the defect. The area thus outlined was incised deep to adipose tissue with a #15 scalpel blade.  The skin margins were undermined to an appropriate distance in all directions utilizing iris scissors.
Helical Rim Advancement Flap Text: The defect edges were debeveled with a #15 blade scalpel.  Given the location of the defect and the proximity to free margins (helical rim) a double helical rim advancement flap was deemed most appropriate.  Using a sterile surgical marker, the appropriate advancement flaps were drawn incorporating the defect and placing the expected incisions between the helical rim and antihelix where possible.  The area thus outlined was incised through and through with a #15 scalpel blade.  With a skin hook and iris scissors, the flaps were gently and sharply undermined and freed up.
Bilateral Helical Rim Advancement Flap Text: The defect edges were debeveled with a #15 blade scalpel.  Given the location of the defect and the proximity to free margins (helical rim) a bilateral helical rim advancement flap was deemed most appropriate.  Using a sterile surgical marker, the appropriate advancement flaps were drawn incorporating the defect and placing the expected incisions between the helical rim and antihelix where possible.  The area thus outlined was incised through and through with a #15 scalpel blade.  With a skin hook and iris scissors, the flaps were gently and sharply undermined and freed up.
Ear Star Wedge Flap Text: The defect edges were debeveled with a #15 blade scalpel.  Given the location of the defect and the proximity to free margins (helical rim) an ear star wedge flap was deemed most appropriate.  Using a sterile surgical marker, the appropriate flap was drawn incorporating the defect and placing the expected incisions between the helical rim and antihelix where possible.  The area thus outlined was incised through and through with a #15 scalpel blade.
Banner Transposition Flap Text: The defect edges were debeveled with a #15 scalpel blade.  Given the location of the defect and the proximity to free margins a Banner transposition flap was deemed most appropriate.  Using a sterile surgical marker, an appropriate flap drawn around the defect. The area thus outlined was incised deep to adipose tissue with a #15 scalpel blade.  The skin margins were undermined to an appropriate distance in all directions utilizing iris scissors.
Bilobed Flap Text: The defect edges were debeveled with a #15 scalpel blade.  Given the location of the defect and the proximity to free margins a bilobe flap was deemed most appropriate.  Using a sterile surgical marker, an appropriate bilobe flap drawn around the defect.    The area thus outlined was incised deep to adipose tissue with a #15 scalpel blade.  The skin margins were undermined to an appropriate distance in all directions utilizing iris scissors.
Bilobed Transposition Flap Text: The defect edges were debeveled with a #15 scalpel blade.  Given the location of the defect and the proximity to free margins a bilobed transposition flap was deemed most appropriate.  Using a sterile surgical marker, an appropriate bilobe flap drawn around the defect.    The area thus outlined was incised deep to adipose tissue with a #15 scalpel blade.  The skin margins were undermined to an appropriate distance in all directions utilizing iris scissors.
Trilobed Flap Text: The defect edges were debeveled with a #15 scalpel blade.  Given the location of the defect and the proximity to free margins a trilobed flap was deemed most appropriate.  Using a sterile surgical marker, an appropriate trilobed flap drawn around the defect.    The area thus outlined was incised deep to adipose tissue with a #15 scalpel blade.  The skin margins were undermined to an appropriate distance in all directions utilizing iris scissors.
Dorsal Nasal Flap Text: The defect edges were debeveled with a #15 scalpel blade.  Given the location of the defect and the proximity to free margins a dorsal nasal flap was deemed most appropriate.  Using a sterile surgical marker, an appropriate dorsal nasal flap was drawn around the defect.    The area thus outlined was incised deep to adipose tissue with a #15 scalpel blade.  The skin margins were undermined to an appropriate distance in all directions utilizing iris scissors.
Island Pedicle Flap Text: The defect edges were debeveled with a #15 scalpel blade.  Given the location of the defect, shape of the defect and the proximity to free margins an island pedicle advancement flap was deemed most appropriate.  Using a sterile surgical marker, an appropriate advancement flap was drawn incorporating the defect, outlining the appropriate donor tissue and placing the expected incisions within the relaxed skin tension lines where possible.    The area thus outlined was incised deep to adipose tissue with a #15 scalpel blade.  The skin margins were undermined to an appropriate distance in all directions around the primary defect and laterally outward around the island pedicle utilizing iris scissors.  There was minimal undermining beneath the pedicle flap.
Island Pedicle Flap With Canthal Suspension Text: The defect edges were debeveled with a #15 scalpel blade.  Given the location of the defect, shape of the defect and the proximity to free margins an island pedicle advancement flap was deemed most appropriate.  Using a sterile surgical marker, an appropriate advancement flap was drawn incorporating the defect, outlining the appropriate donor tissue and placing the expected incisions within the relaxed skin tension lines where possible. The area thus outlined was incised deep to adipose tissue with a #15 scalpel blade.  The skin margins were undermined to an appropriate distance in all directions around the primary defect and laterally outward around the island pedicle utilizing iris scissors.  There was minimal undermining beneath the pedicle flap. A suspension suture was placed in the canthal tendon to prevent tension and prevent ectropion.
Alar Island Pedicle Flap Text: The defect edges were debeveled with a #15 scalpel blade.  Given the location of the defect, shape of the defect and the proximity to the alar rim an island pedicle advancement flap was deemed most appropriate.  Using a sterile surgical marker, an appropriate advancement flap was drawn incorporating the defect, outlining the appropriate donor tissue and placing the expected incisions within the nasal ala running parallel to the alar rim. The area thus outlined was incised with a #15 scalpel blade.  The skin margins were undermined minimally to an appropriate distance in all directions around the primary defect and laterally outward around the island pedicle utilizing iris scissors.  There was minimal undermining beneath the pedicle flap.
Double Island Pedicle Flap Text: The defect edges were debeveled with a #15 scalpel blade.  Given the location of the defect, shape of the defect and the proximity to free margins a double island pedicle advancement flap was deemed most appropriate.  Using a sterile surgical marker, an appropriate advancement flap was drawn incorporating the defect, outlining the appropriate donor tissue and placing the expected incisions within the relaxed skin tension lines where possible.    The area thus outlined was incised deep to adipose tissue with a #15 scalpel blade.  The skin margins were undermined to an appropriate distance in all directions around the primary defect and laterally outward around the island pedicle utilizing iris scissors.  There was minimal undermining beneath the pedicle flap.
Island Pedicle Flap-Requiring Vessel Identification Text: The defect edges were debeveled with a #15 scalpel blade.  Given the location of the defect, shape of the defect and the proximity to free margins an island pedicle advancement flap was deemed most appropriate.  Using a sterile surgical marker, an appropriate advancement flap was drawn, based on the axial vessel mentioned above, incorporating the defect, outlining the appropriate donor tissue and placing the expected incisions within the relaxed skin tension lines where possible.    The area thus outlined was incised deep to adipose tissue with a #15 scalpel blade.  The skin margins were undermined to an appropriate distance in all directions around the primary defect and laterally outward around the island pedicle utilizing iris scissors.  There was minimal undermining beneath the pedicle flap.
Keystone Flap Text: The defect edges were debeveled with a #15 scalpel blade.  Given the location of the defect, shape of the defect a keystone flap was deemed most appropriate.  Using a sterile surgical marker, an appropriate keystone flap was drawn incorporating the defect, outlining the appropriate donor tissue and placing the expected incisions within the relaxed skin tension lines where possible. The area thus outlined was incised deep to adipose tissue with a #15 scalpel blade.  The skin margins were undermined to an appropriate distance in all directions around the primary defect and laterally outward around the flap utilizing iris scissors.
O-T Plasty Text: The defect edges were debeveled with a #15 scalpel blade.  Given the location of the defect, shape of the defect and the proximity to free margins an O-T plasty was deemed most appropriate.  Using a sterile surgical marker, an appropriate O-T plasty was drawn incorporating the defect and placing the expected incisions within the relaxed skin tension lines where possible.    The area thus outlined was incised deep to adipose tissue with a #15 scalpel blade.  The skin margins were undermined to an appropriate distance in all directions utilizing iris scissors.
O-Z Plasty Text: The defect edges were debeveled with a #15 scalpel blade.  Given the location of the defect, shape of the defect and the proximity to free margins an O-Z plasty (double transposition flap) was deemed most appropriate.  Using a sterile surgical marker, the appropriate transposition flaps were drawn incorporating the defect and placing the expected incisions within the relaxed skin tension lines where possible.    The area thus outlined was incised deep to adipose tissue with a #15 scalpel blade.  The skin margins were undermined to an appropriate distance in all directions utilizing iris scissors.  Hemostasis was achieved with electrocautery.  The flaps were then transposed into place, one clockwise and the other counterclockwise, and anchored with interrupted buried subcutaneous sutures.
Double O-Z Plasty Text: The defect edges were debeveled with a #15 scalpel blade. Given the location of the defect, shape of the defect and the proximity to free margins a Double O-Z plasty (double transposition flap) was deemed most appropriate. Using a sterile surgical marker, the appropriate transposition flaps were drawn incorporating the defect and placing the expected incisions within the relaxed skin tension lines where possible. The area thus outlined was incised deep to adipose tissue with a #15 scalpel blade. The skin margins were undermined to an appropriate distance in all directions utilizing iris scissors. Hemostasis was achieved with electrocautery. The flaps were then transposed and carried over into place, one clockwise and the other counterclockwise, and anchored with interrupted buried subcutaneous sutures.
V-Y Plasty Text: The defect edges were debeveled with a #15 scalpel blade.  Given the location of the defect, shape of the defect and the proximity to free margins an V-Y advancement flap was deemed most appropriate.  Using a sterile surgical marker, an appropriate advancement flap was drawn incorporating the defect and placing the expected incisions within the relaxed skin tension lines where possible.    The area thus outlined was incised deep to adipose tissue with a #15 scalpel blade.  The skin margins were undermined to an appropriate distance in all directions utilizing iris scissors.
H Plasty Text: Given the location of the defect, shape of the defect and the proximity to free margins a H-plasty was deemed most appropriate for repair.  Using a sterile surgical marker, the appropriate advancement arms of the H-plasty were drawn incorporating the defect and placing the expected incisions within the relaxed skin tension lines where possible. The area thus outlined was incised deep to adipose tissue with a #15 scalpel blade. The skin margins were undermined to an appropriate distance in all directions utilizing iris scissors.  The opposing advancement arms were then advanced into place in opposite direction and anchored with interrupted buried subcutaneous sutures.
W Plasty Text: The lesion was extirpated to the level of the fat with a #15 scalpel blade.  Given the location of the defect, shape of the defect and the proximity to free margins a W-plasty was deemed most appropriate for repair.  Using a sterile surgical marker, the appropriate transposition arms of the W-plasty were drawn incorporating the defect and placing the expected incisions within the relaxed skin tension lines where possible.    The area thus outlined was incised deep to adipose tissue with a #15 scalpel blade.  The skin margins were undermined to an appropriate distance in all directions utilizing iris scissors.  The opposing transposition arms were then transposed into place in opposite direction and anchored with interrupted buried subcutaneous sutures.
Z Plasty Text: The lesion was extirpated to the level of the fat with a #15 scalpel blade.  Given the location of the defect, shape of the defect and the proximity to free margins a Z-plasty was deemed most appropriate for repair.  Using a sterile surgical marker, the appropriate transposition arms of the Z-plasty were drawn incorporating the defect and placing the expected incisions within the relaxed skin tension lines where possible.    The area thus outlined was incised deep to adipose tissue with a #15 scalpel blade.  The skin margins were undermined to an appropriate distance in all directions utilizing iris scissors.  The opposing transposition arms were then transposed into place in opposite direction and anchored with interrupted buried subcutaneous sutures.
Double Z Plasty Text: The lesion was extirpated to the level of the fat with a #15 scalpel blade. Given the location of the defect, shape of the defect and the proximity to free margins a double Z-plasty was deemed most appropriate for repair. Using a sterile surgical marker, the appropriate transposition arms of the double Z-plasty were drawn incorporating the defect and placing the expected incisions within the relaxed skin tension lines where possible. The area thus outlined was incised deep to adipose tissue with a #15 scalpel blade. The skin margins were undermined to an appropriate distance in all directions utilizing iris scissors. The opposing transposition arms were then transposed and carried over into place in opposite direction and anchored with interrupted buried subcutaneous sutures.
Zygomaticofacial Flap Text: Given the location of the defect, shape of the defect and the proximity to free margins a zygomaticofacial flap was deemed most appropriate for repair. Using a sterile surgical marker, the appropriate flap was drawn incorporating the defect and placing the expected incisions within the relaxed skin tension lines where possible. The area thus outlined was incised deep to adipose tissue with a #15 scalpel blade with preservation of a vascular pedicle.  The skin margins were undermined to an appropriate distance in all directions utilizing iris scissors. The flap was then carried over into the defect and anchored with interrupted buried subcutaneous sutures.
Cheek Interpolation Flap Text: A decision was made to reconstruct the defect utilizing an interpolation axial flap and a staged reconstruction.  A telfa template was made of the defect.  This telfa template was then used to outline the Cheek Interpolation flap.  The donor area for the pedicle flap was then injected with anesthesia.  The flap was excised through the skin and subcutaneous tissue down to the layer of the underlying musculature.  The interpolation flap was carefully excised within this deep plane to maintain its blood supply.  The edges of the donor site were undermined.   The donor site was closed in a primary fashion.  The pedicle was then rotated into position and sutured.  Once the tube was sutured into place, adequate blood supply was confirmed with blanching and refill.  The pedicle was then wrapped with xeroform gauze and dressed appropriately with a telfa and gauze bandage to ensure continued blood supply and protect the attached pedicle.
Cheek-To-Nose Interpolation Flap Text: A decision was made to reconstruct the defect utilizing an interpolation axial flap and a staged reconstruction.  A telfa template was made of the defect.  This telfa template was then used to outline the Cheek-To-Nose Interpolation flap.  The donor area for the pedicle flap was then injected with anesthesia.  The flap was excised through the skin and subcutaneous tissue down to the layer of the underlying musculature.  The interpolation flap was carefully excised within this deep plane to maintain its blood supply.  The edges of the donor site were undermined.   The donor site was closed in a primary fashion.  The pedicle was then rotated into position and sutured.  Once the tube was sutured into place, adequate blood supply was confirmed with blanching and refill.  The pedicle was then wrapped with xeroform gauze and dressed appropriately with a telfa and gauze bandage to ensure continued blood supply and protect the attached pedicle.
Interpolation Flap Text: A decision was made to reconstruct the defect utilizing an interpolation axial flap and a staged reconstruction.  A telfa template was made of the defect.  This telfa template was then used to outline the interpolation flap.  The donor area for the pedicle flap was then injected with anesthesia.  The flap was excised through the skin and subcutaneous tissue down to the layer of the underlying musculature.  The interpolation flap was carefully excised within this deep plane to maintain its blood supply.  The edges of the donor site were undermined.   The donor site was closed in a primary fashion.  The pedicle was then rotated into position and sutured.  Once the tube was sutured into place, adequate blood supply was confirmed with blanching and refill.  The pedicle was then wrapped with xeroform gauze and dressed appropriately with a telfa and gauze bandage to ensure continued blood supply and protect the attached pedicle.
Melolabial Interpolation Flap Text: A decision was made to reconstruct the defect utilizing an interpolation axial flap and a staged reconstruction.  A telfa template was made of the defect.  This telfa template was then used to outline the melolabial interpolation flap.  The donor area for the pedicle flap was then injected with anesthesia.  The flap was excised through the skin and subcutaneous tissue down to the layer of the underlying musculature.  The pedicle flap was carefully excised within this deep plane to maintain its blood supply.  The edges of the donor site were undermined.   The donor site was closed in a primary fashion.  The pedicle was then rotated into position and sutured.  Once the tube was sutured into place, adequate blood supply was confirmed with blanching and refill.  The pedicle was then wrapped with xeroform gauze and dressed appropriately with a telfa and gauze bandage to ensure continued blood supply and protect the attached pedicle.
Mastoid Interpolation Flap Text: A decision was made to reconstruct the defect utilizing an interpolation axial flap and a staged reconstruction.  A telfa template was made of the defect.  This telfa template was then used to outline the mastoid interpolation flap.  The donor area for the pedicle flap was then injected with anesthesia.  The flap was excised through the skin and subcutaneous tissue down to the layer of the underlying musculature.  The pedicle flap was carefully excised within this deep plane to maintain its blood supply.  The edges of the donor site were undermined.   The donor site was closed in a primary fashion.  The pedicle was then rotated into position and sutured.  Once the tube was sutured into place, adequate blood supply was confirmed with blanching and refill.  The pedicle was then wrapped with xeroform gauze and dressed appropriately with a telfa and gauze bandage to ensure continued blood supply and protect the attached pedicle.
Posterior Auricular Interpolation Flap Text: A decision was made to reconstruct the defect utilizing an interpolation axial flap and a staged reconstruction.  A telfa template was made of the defect.  This telfa template was then used to outline the posterior auricular interpolation flap.  The donor area for the pedicle flap was then injected with anesthesia.  The flap was excised through the skin and subcutaneous tissue down to the layer of the underlying musculature.  The pedicle flap was carefully excised within this deep plane to maintain its blood supply.  The edges of the donor site were undermined.   The donor site was closed in a primary fashion.  The pedicle was then rotated into position and sutured.  Once the tube was sutured into place, adequate blood supply was confirmed with blanching and refill.  The pedicle was then wrapped with xeroform gauze and dressed appropriately with a telfa and gauze bandage to ensure continued blood supply and protect the attached pedicle.
Paramedian Forehead Flap Text: A decision was made to reconstruct the defect utilizing an interpolation axial flap and a staged reconstruction.  A telfa template was made of the defect.  This telfa template was then used to outline the paramedian forehead pedicle flap.  The donor area for the pedicle flap was then injected with anesthesia.  The flap was excised through the skin and subcutaneous tissue down to the layer of the underlying musculature.  The pedicle flap was carefully excised within this deep plane to maintain its blood supply.  The edges of the donor site were undermined.   The donor site was closed in a primary fashion.  The pedicle was then rotated into position and sutured.  Once the tube was sutured into place, adequate blood supply was confirmed with blanching and refill.  The pedicle was then wrapped with xeroform gauze and dressed appropriately with a telfa and gauze bandage to ensure continued blood supply and protect the attached pedicle.
Abbe Flap (Upper To Lower Lip) Text: The defect of the lower lip was assessed and measured.  Given the location and size of the defect, an Abbe flap was deemed most appropriate. Using a sterile surgical marker, an appropriate Abbe flap was measured and drawn on the upper lip. Local anesthesia was then infiltrated.  A scalpel was then used to incise the upper lip through and through the skin, vermilion, muscle and mucosa, leaving the flap pedicled on the opposite side.  The flap was then rotated and transferred to the lower lip defect.  The flap was then sutured into place with a three layer technique, closing the orbicularis oris muscle layer with subcutaneous buried sutures, followed by a mucosal layer and an epidermal layer.
Abbe Flap (Lower To Upper Lip) Text: The defect of the upper lip was assessed and measured.  Given the location and size of the defect, an Abbe flap was deemed most appropriate. Using a sterile surgical marker, an appropriate Abbe flap was measured and drawn on the lower lip. Local anesthesia was then infiltrated. A scalpel was then used to incise the upper lip through and through the skin, vermilion, muscle and mucosa, leaving the flap pedicled on the opposite side.  The flap was then rotated and transferred to the lower lip defect.  The flap was then sutured into place with a three layer technique, closing the orbicularis oris muscle layer with subcutaneous buried sutures, followed by a mucosal layer and an epidermal layer.
Estlander Flap (Upper To Lower Lip) Text: The defect of the lower lip was assessed and measured.  Given the location and size of the defect, an Estlander flap was deemed most appropriate. Using a sterile surgical marker, an appropriate Estlander flap was measured and drawn on the upper lip. Local anesthesia was then infiltrated. A scalpel was then used to incise the lateral aspect of the flap, through skin, muscle and mucosa, leaving the flap pedicled medially.  The flap was then rotated and positioned to fill the lower lip defect.  The flap was then sutured into place with a three layer technique, closing the orbicularis oris muscle layer with subcutaneous buried sutures, followed by a mucosal layer and an epidermal layer.
Cheiloplasty (Less Than 50%) Text: A decision was made to reconstruct the defect with a  cheiloplasty.  The defect was undermined extensively.  Additional obicularis oris muscle was excised with a 15 blade scalpel.  The defect was converted into a full thickness wedge, of less than 50% of the vertical height of the lip, to facilite a better cosmetic result.  Small vessels were then tied off with 5-0 monocyrl. The obicularis oris, superficial fascia, adipose and dermis were then reapproximated.  After the deeper layers were approximated the epidermis was reapproximated with particular care given to realign the vermilion border.
Cheiloplasty (Complex) Text: A decision was made to reconstruct the defect with a  cheiloplasty.  The defect was undermined extensively.  Additional obicularis oris muscle was excised with a 15 blade scalpel.  The defect was converted into a full thickness wedge to facilite a better cosmetic result.  Small vessels were then tied off with 5-0 monocyrl. The obicularis oris, superficial fascia, adipose and dermis were then reapproximated.  After the deeper layers were approximated the epidermis was reapproximated with particular care given to realign the vermilion border.
Ear Wedge Repair Text: A wedge excision was completed by carrying down an excision through the full thickness of the ear and cartilage with an inward facing Burow's triangle. The wound was then closed in a layered fashion.
Full Thickness Lip Wedge Repair (Flap) Text: Given the location of the defect and the proximity to free margins a full thickness wedge repair was deemed most appropriate.  Using a sterile surgical marker, the appropriate repair was drawn incorporating the defect and placing the expected incisions perpendicular to the vermilion border.  The vermilion border was also meticulously outlined to ensure appropriate reapproximation during the repair.  The area thus outlined was incised through and through with a #15 scalpel blade.  The muscularis and dermis were reaproximated with deep sutures following hemostasis. Care was taken to realign the vermilion border before proceeding with the superficial closure.  Once the vermilion was realigned the superfical and mucosal closure was finished.
Ftsg Text: The defect edges were debeveled with a #15 scalpel blade.  Given the location of the defect, shape of the defect and the proximity to free margins a full thickness skin graft was deemed most appropriate.  Using a sterile surgical marker, the primary defect shape was transferred to the donor site. The area thus outlined was incised deep to adipose tissue with a #15 scalpel blade.  The harvested graft was then trimmed of adipose tissue until only dermis and epidermis was left.  The skin margins of the secondary defect were undermined to an appropriate distance in all directions utilizing iris scissors.  The secondary defect was closed with interrupted buried subcutaneous sutures.  The skin edges were then re-apposed with running  sutures.  The skin graft was then placed in the primary defect and oriented appropriately.
Split-Thickness Skin Graft Text: The defect edges were debeveled with a #15 scalpel blade.  Given the location of the defect, shape of the defect and the proximity to free margins a split thickness skin graft was deemed most appropriate.  Using a sterile surgical marker, the primary defect shape was transferred to the donor site. The split thickness graft was then harvested.  The skin graft was then placed in the primary defect and oriented appropriately.
Pinch Graft Text: The defect edges were debeveled with a #15 scalpel blade. Given the location of the defect, shape of the defect and the proximity to free margins a pinch graft was deemed most appropriate. Using a sterile surgical marker, the primary defect shape was transferred to the donor site. The area thus outlined was incised deep to adipose tissue with a #15 scalpel blade.  The harvested graft was then trimmed of adipose tissue until only dermis and epidermis was left. The skin margins of the secondary defect were undermined to an appropriate distance in all directions utilizing iris scissors.  The secondary defect was closed with interrupted buried subcutaneous sutures.  The skin edges were then re-apposed with running  sutures.  The skin graft was then placed in the primary defect and oriented appropriately.
Burow's Graft Text: The defect edges were debeveled with a #15 scalpel blade. Given the location of the defect, shape of the defect, the proximity to free margins and the presence of a standing cone deformity a Burow's skin graft was deemed most appropriate. The standing cone was removed and this tissue was then trimmed to the shape of the primary defect. The adipose tissue was also removed until only dermis and epidermis were left.  The skin margins of the secondary defect were undermined to an appropriate distance in all directions utilizing iris scissors.  The secondary defect was closed with interrupted buried subcutaneous sutures.  The skin edges were then re-apposed with running  sutures.  The skin graft was then placed in the primary defect and oriented appropriately.
Cartilage Graft Text: The defect edges were debeveled with a #15 scalpel blade.  Given the location of the defect, shape of the defect, the fact the defect involved a full thickness cartilage defect a cartilage graft was deemed most appropriate.  An appropriate donor site was identified, cleansed, and anesthetized. The cartilage graft was then harvested and transferred to the recipient site, oriented appropriately and then sutured into place.  The secondary defect was then repaired using a primary closure.
Composite Graft Text: The defect edges were debeveled with a #15 scalpel blade.  Given the location of the defect, shape of the defect, the proximity to free margins and the fact the defect was full thickness a composite graft was deemed most appropriate.  The defect was outline and then transferred to the donor site.  A full thickness graft was then excised from the donor site. The graft was then placed in the primary defect, oriented appropriately and then sutured into place.  The secondary defect was then repaired using a primary closure.
Epidermal Autograft Text: The defect edges were debeveled with a #15 scalpel blade.  Given the location of the defect, shape of the defect and the proximity to free margins an epidermal autograft was deemed most appropriate.  Using a sterile surgical marker, the primary defect shape was transferred to the donor site. The epidermal graft was then harvested.  The skin graft was then placed in the primary defect and oriented appropriately.
Dermal Autograft Text: The defect edges were debeveled with a #15 scalpel blade.  Given the location of the defect, shape of the defect and the proximity to free margins a dermal autograft was deemed most appropriate.  Using a sterile surgical marker, the primary defect shape was transferred to the donor site. The area thus outlined was incised deep to adipose tissue with a #15 scalpel blade.  The harvested graft was then trimmed of adipose and epidermal tissue until only dermis was left.  The skin graft was then placed in the primary defect and oriented appropriately.
Skin Substitute Text: The defect edges were debeveled with a #15 scalpel blade.  Given the location of the defect, shape of the defect and the proximity to free margins a skin substitute graft was deemed most appropriate.  The graft material was trimmed to fit the size of the defect. The graft was then placed in the primary defect and oriented appropriately.
Tissue Cultured Epidermal Autograft Text: The defect edges were debeveled with a #15 scalpel blade.  Given the location of the defect, shape of the defect and the proximity to free margins a tissue cultured epidermal autograft was deemed most appropriate.  The graft was then trimmed to fit the size of the defect.  The graft was then placed in the primary defect and oriented appropriately.
Xenograft Text: The defect edges were debeveled with a #15 scalpel blade.  Given the location of the defect, shape of the defect and the proximity to free margins a xenograft was deemed most appropriate.  The graft was then trimmed to fit the size of the defect.  The graft was then placed in the primary defect and oriented appropriately.
Purse String (Simple) Text: Given the location of the defect and the characteristics of the surrounding skin a purse string closure was deemed most appropriate.  Undermining was performed circumfirentially around the surgical defect.  A purse string suture was then placed and tightened.
Purse String (Intermediate) Text: Given the location of the defect and the characteristics of the surrounding skin a purse string intermediate closure was deemed most appropriate.  Undermining was performed circumfirentially around the surgical defect.  A purse string suture was then placed and tightened.
Partial Purse String (Simple) Text: Given the location of the defect and the characteristics of the surrounding skin a simple purse string closure was deemed most appropriate.  Undermining was performed circumfirentially around the surgical defect.  A purse string suture was then placed and tightened. Wound tension only allowed a partial closure of the circular defect.
Partial Purse String (Intermediate) Text: Given the location of the defect and the characteristics of the surrounding skin an intermediate purse string closure was deemed most appropriate.  Undermining was performed circumfirentially around the surgical defect.  A purse string suture was then placed and tightened. Wound tension only allowed a partial closure of the circular defect.
Localized Dermabrasion With Wire Brush Text: The patient was draped in routine manner.  Localized dermabrasion using 3 x 17 mm wire brush was performed in routine manner to papillary dermis. This spot dermabrasion is being performed to complete skin cancer reconstruction. It also will eliminate the other sun damaged precancerous cells that are known to be part of the regional effect of a lifetime's worth of sun exposure. This localized dermabrasion is therapeutic and should not be considered cosmetic in any regard.
Localized Dermabrasion With Sand Papertext: The patient was draped in routine manner.  Localized dermabrasion using sterile sand paper was performed in routine manner to papillary dermis. This spot dermabrasion is being performed to complete skin cancer reconstruction. It also will eliminate the other sun damaged precancerous cells that are known to be part of the regional effect of a lifetime's worth of sun exposure. This localized dermabrasion is therapeutic and should not be considered cosmetic in any regard.
Localized Dermabrasion With 15 Blade Text: The patient was draped in routine manner.  Localized dermabrasion using a 15 blade was performed in routine manner to papillary dermis. This spot dermabrasion is being performed to complete skin cancer reconstruction. It also will eliminate the other sun damaged precancerous cells that are known to be part of the regional effect of a lifetime's worth of sun exposure. This localized dermabrasion is therapeutic and should not be considered cosmetic in any regard.
Tarsorrhaphy Text: A tarsorrhaphy was performed using Frost sutures.
Intermediate Repair And Flap Additional Text (Will Appearing After The Standard Complex Repair Text): The intermediate repair was not sufficient to completely close the primary defect. The remaining additional defect was repaired with the flap mentioned below.
Intermediate Repair And Graft Additional Text (Will Appearing After The Standard Complex Repair Text): The intermediate repair was not sufficient to completely close the primary defect. The remaining additional defect was repaired with the graft mentioned below.
Complex Repair And Flap Additional Text (Will Appearing After The Standard Complex Repair Text): The complex repair was not sufficient to completely close the primary defect. The remaining additional defect was repaired with the flap mentioned below.
Complex Repair And Graft Additional Text (Will Appearing After The Standard Complex Repair Text): The complex repair was not sufficient to completely close the primary defect. The remaining additional defect was repaired with the graft mentioned below.
Eyelid Full Thickness Repair - 95337: The eyelid defect was full thickness which required a wedge repair of the eyelid. Special care was taken to ensure that the eyelid margin was realligned when placing sutures.
Eyelid Partial Thickness Repair - 24107: The eyelid defect was partial thickness which required a wedge repair of the eyelid. Special care was taken to ensure that the eyelid margin was realligned when placing sutures.
Manual Repair Warning Statement: We plan on removing the manually selected variable below in favor of our much easier automatic structured text blocks found in the previous tab. We decided to do this to help make the flow better and give you the full power of structured data. Manual selection is never going to be ideal in our platform and I would encourage you to avoid using manual selection from this point on, especially since I will be sunsetting this feature. It is important that you do one of two things with the customized text below. First, you can save all of the text in a word file so you can have it for future reference. Second, transfer the text to the appropriate area in the Library tab. Lastly, if there is a flap or graft type which we do not have you need to let us know right away so I can add it in before the variable is hidden. No need to panic, we plan to give you roughly 6 months to make the change.
Same Histology In Subsequent Stages Text: The pattern and morphology of the tumor is as described in the first stage.
No Residual Tumor Seen Histology Text: There were no malignant cells seen in the sections examined.
Inflammation Suggestive Of Cancer Camouflage Histology Text: There was a dense lymphocytic infiltrate which prevented adequate histologic evaluation of adjacent structures.
Bcc Histology Text: There were numerous aggregates of basaloid cells.
Bcc Infiltrative Histology Text: There were numerous aggregates of basaloid cells demonstrating an infiltrative pattern.
Mart-1 - Positive Histology Text: MART-1 staining demonstrates areas of higher density and clustering of melanocytes with Pagetoid spread upwards within the epidermis. The surgical margins are positive for tumor cells.
Mart-1 - Negative Histology Text: MART-1 staining demonstrates a normal density and pattern of melanocytes along the dermal-epidermal junction. The surgical margins are negative for tumor cells.
Information: Selecting Yes will display possible errors in your note based on the variables you have selected. This validation is only offered as a suggestion for you. PLEASE NOTE THAT THE VALIDATION TEXT WILL BE REMOVED WHEN YOU FINALIZE YOUR NOTE. IF YOU WANT TO FAX A PRELIMINARY NOTE YOU WILL NEED TO TOGGLE THIS TO 'NO' IF YOU DO NOT WANT IT IN YOUR FAXED NOTE.
Bill 59 Modifier?: No - Continue to Bill 79 Modifier

## 2024-07-29 ENCOUNTER — APPOINTMENT (RX ONLY)
Dept: URBAN - METROPOLITAN AREA CLINIC 150 | Facility: CLINIC | Age: 79
Setting detail: DERMATOLOGY
End: 2024-07-29

## 2024-07-29 DIAGNOSIS — Z48.02 ENCOUNTER FOR REMOVAL OF SUTURES: ICD-10-CM

## 2024-07-29 PROCEDURE — 99024 POSTOP FOLLOW-UP VISIT: CPT

## 2024-07-29 PROCEDURE — ? SUTURE REMOVAL (GLOBAL PERIOD)

## 2024-07-29 ASSESSMENT — LOCATION DETAILED DESCRIPTION DERM
LOCATION DETAILED: RIGHT CENTRAL LATERAL NECK
LOCATION DETAILED: RIGHT DISTAL DORSAL FOREARM

## 2024-07-29 ASSESSMENT — LOCATION SIMPLE DESCRIPTION DERM
LOCATION SIMPLE: RIGHT FOREARM
LOCATION SIMPLE: NECK

## 2024-07-29 ASSESSMENT — LOCATION ZONE DERM
LOCATION ZONE: NECK
LOCATION ZONE: ARM

## 2024-07-29 NOTE — PROCEDURE: SUTURE REMOVAL (GLOBAL PERIOD)
Detail Level: Detailed
Add 79078 Cpt? (Important Note: In 2017 The Use Of 80432 Is Being Tracked By Cms To Determine Future Global Period Reimbursement For Global Periods): yes

## 2024-09-14 NOTE — PROCEDURE: MOHS SURGERY
LMP Island Pedicle Flap Text: The defect edges were debeveled with a #15 scalpel blade.  Given the location of the defect, shape of the defect and the proximity to free margins an island pedicle advancement flap was deemed most appropriate.  Using a sterile surgical marker, an appropriate advancement flap was drawn incorporating the defect, outlining the appropriate donor tissue and placing the expected incisions within the relaxed skin tension lines where possible.    The area thus outlined was incised deep to adipose tissue with a #15 scalpel blade.  The skin margins were undermined to an appropriate distance in all directions around the primary defect and laterally outward around the island pedicle utilizing iris scissors.  There was minimal undermining beneath the pedicle flap.

## 2024-09-23 ENCOUNTER — APPOINTMENT (RX ONLY)
Dept: URBAN - METROPOLITAN AREA CLINIC 151 | Facility: CLINIC | Age: 79
Setting detail: DERMATOLOGY
End: 2024-09-23

## 2024-09-23 DIAGNOSIS — Z85.828 PERSONAL HISTORY OF OTHER MALIGNANT NEOPLASM OF SKIN: ICD-10-CM

## 2024-09-23 DIAGNOSIS — L81.4 OTHER MELANIN HYPERPIGMENTATION: ICD-10-CM

## 2024-09-23 DIAGNOSIS — L57.0 ACTINIC KERATOSIS: ICD-10-CM

## 2024-09-23 DIAGNOSIS — D22 MELANOCYTIC NEVI: ICD-10-CM

## 2024-09-23 DIAGNOSIS — Z85.820 PERSONAL HISTORY OF MALIGNANT MELANOMA OF SKIN: ICD-10-CM

## 2024-09-23 DIAGNOSIS — Z48.02 ENCOUNTER FOR REMOVAL OF SUTURES: ICD-10-CM

## 2024-09-23 DIAGNOSIS — L82.1 OTHER SEBORRHEIC KERATOSIS: ICD-10-CM

## 2024-09-23 PROBLEM — D22.5 MELANOCYTIC NEVI OF TRUNK: Status: ACTIVE | Noted: 2024-09-23

## 2024-09-23 PROCEDURE — ? SUTURE REMOVAL (NO GLOBAL PERIOD)

## 2024-09-23 PROCEDURE — ? OTHER

## 2024-09-23 PROCEDURE — 17003 DESTRUCT PREMALG LES 2-14: CPT

## 2024-09-23 PROCEDURE — 99213 OFFICE O/P EST LOW 20 MIN: CPT | Mod: 25

## 2024-09-23 PROCEDURE — 15853 REMOVAL SUTR/STAPL XREQ ANES: CPT

## 2024-09-23 PROCEDURE — 17000 DESTRUCT PREMALG LESION: CPT

## 2024-09-23 PROCEDURE — ? TREATMENT REGIMEN

## 2024-09-23 PROCEDURE — ? FULL BODY SKIN EXAM

## 2024-09-23 PROCEDURE — ? LIQUID NITROGEN

## 2024-09-23 PROCEDURE — ? COUNSELING

## 2024-09-23 PROCEDURE — ? ADDITIONAL NOTES

## 2024-09-23 ASSESSMENT — LOCATION ZONE DERM
LOCATION ZONE: ARM
LOCATION ZONE: NECK
LOCATION ZONE: SCALP
LOCATION ZONE: TRUNK

## 2024-09-23 ASSESSMENT — LOCATION SIMPLE DESCRIPTION DERM
LOCATION SIMPLE: LEFT SCALP
LOCATION SIMPLE: SCALP
LOCATION SIMPLE: RIGHT FOREARM
LOCATION SIMPLE: RIGHT ANTERIOR NECK
LOCATION SIMPLE: RIGHT SHOULDER
LOCATION SIMPLE: RIGHT UPPER BACK
LOCATION SIMPLE: LEFT UPPER BACK

## 2024-09-23 ASSESSMENT — LOCATION DETAILED DESCRIPTION DERM
LOCATION DETAILED: LEFT SUPERIOR MEDIAL UPPER BACK
LOCATION DETAILED: LEFT MEDIAL UPPER BACK
LOCATION DETAILED: RIGHT DISTAL DORSAL FOREARM
LOCATION DETAILED: RIGHT SUPERIOR LATERAL UPPER BACK
LOCATION DETAILED: RIGHT POSTERIOR SHOULDER
LOCATION DETAILED: LEFT CENTRAL PARIETAL SCALP
LOCATION DETAILED: RIGHT SUPERIOR PARIETAL SCALP
LOCATION DETAILED: LEFT MEDIAL FRONTAL SCALP
LOCATION DETAILED: RIGHT INFERIOR ANTERIOR NECK
LOCATION DETAILED: LEFT INFERIOR MEDIAL UPPER BACK
LOCATION DETAILED: LEFT SUPERIOR PARIETAL SCALP

## 2024-09-23 NOTE — PROCEDURE: OTHER
Other (Free Text): [place]
Render Risk Assessment In Note?: no
Note Text (......Xxx Chief Complaint.): This diagnosis correlates with the
Detail Level: Zone

## 2024-09-23 NOTE — PROCEDURE: SUTURE REMOVAL (NO GLOBAL PERIOD)
Detail Level: Detailed
Add 1585x Cpt? (Do Not Bill If You Billed For The Procedure Placing The Sutures. This Is An Add-On Code That Must Be Billed With An E/M Visit Code): Yes - 69275
Suture Removal Completed By (Optional): Marisela Monk

## 2025-01-14 ENCOUNTER — APPOINTMENT (OUTPATIENT)
Dept: URBAN - METROPOLITAN AREA CLINIC 151 | Facility: CLINIC | Age: 80
Setting detail: DERMATOLOGY
End: 2025-01-14

## 2025-01-14 DIAGNOSIS — Z86.006 PERSONAL HISTORY OF MELANOMA IN-SITU: ICD-10-CM

## 2025-01-14 DIAGNOSIS — L82.1 OTHER SEBORRHEIC KERATOSIS: ICD-10-CM

## 2025-01-14 DIAGNOSIS — L57.0 ACTINIC KERATOSIS: ICD-10-CM | Status: INADEQUATELY CONTROLLED

## 2025-01-14 DIAGNOSIS — L81.4 OTHER MELANIN HYPERPIGMENTATION: ICD-10-CM

## 2025-01-14 DIAGNOSIS — D22 MELANOCYTIC NEVI: ICD-10-CM

## 2025-01-14 DIAGNOSIS — L20.89 OTHER ATOPIC DERMATITIS: ICD-10-CM | Status: INADEQUATELY CONTROLLED

## 2025-01-14 PROBLEM — D48.5 NEOPLASM OF UNCERTAIN BEHAVIOR OF SKIN: Status: ACTIVE | Noted: 2025-01-14

## 2025-01-14 PROBLEM — D22.5 MELANOCYTIC NEVI OF TRUNK: Status: ACTIVE | Noted: 2025-01-14

## 2025-01-14 PROCEDURE — ? LIQUID NITROGEN

## 2025-01-14 PROCEDURE — ? PRESCRIPTION

## 2025-01-14 PROCEDURE — ? TREATMENT REGIMEN

## 2025-01-14 PROCEDURE — 11102 TANGNTL BX SKIN SINGLE LES: CPT | Mod: 59

## 2025-01-14 PROCEDURE — ? BIOPSY BY SHAVE METHOD

## 2025-01-14 PROCEDURE — ? MDM - TREATMENT GOALS

## 2025-01-14 PROCEDURE — ? PRESCRIPTION MEDICATION MANAGEMENT

## 2025-01-14 PROCEDURE — ? FULL BODY SKIN EXAM

## 2025-01-14 PROCEDURE — 17004 DESTROY PREMAL LESIONS 15/>: CPT

## 2025-01-14 PROCEDURE — ? ADDITIONAL NOTES

## 2025-01-14 PROCEDURE — 99214 OFFICE O/P EST MOD 30 MIN: CPT | Mod: 25

## 2025-01-14 PROCEDURE — ? COUNSELING

## 2025-01-14 RX ORDER — TRIAMCINOLONE ACETONIDE 1 MG/G
CREAM TOPICAL
Qty: 80 | Refills: 3 | Status: ERX | COMMUNITY
Start: 2025-01-14

## 2025-01-14 RX ADMIN — TRIAMCINOLONE ACETONIDE: 1 CREAM TOPICAL at 00:00

## 2025-01-14 ASSESSMENT — LOCATION DETAILED DESCRIPTION DERM
LOCATION DETAILED: LEFT SUPERIOR PARIETAL SCALP
LOCATION DETAILED: LEFT INFERIOR LATERAL FOREHEAD
LOCATION DETAILED: RIGHT RADIAL DORSAL HAND
LOCATION DETAILED: RIGHT ANTERIOR SHOULDER
LOCATION DETAILED: RIGHT PROXIMAL DORSAL FOREARM
LOCATION DETAILED: LEFT MEDIAL UPPER BACK
LOCATION DETAILED: LEFT PROXIMAL RADIAL DORSAL FOREARM
LOCATION DETAILED: RIGHT DISTAL DORSAL FOREARM
LOCATION DETAILED: LEFT LATERAL ZYGOMA
LOCATION DETAILED: LEFT FOREHEAD
LOCATION DETAILED: LEFT SUPERIOR OCCIPITAL SCALP
LOCATION DETAILED: LEFT DISTAL DORSAL FOREARM
LOCATION DETAILED: LEFT INFERIOR MEDIAL UPPER BACK
LOCATION DETAILED: LEFT ULNAR DORSAL HAND
LOCATION DETAILED: LEFT PROXIMAL DORSAL FOREARM
LOCATION DETAILED: LEFT SUPERIOR MEDIAL UPPER BACK
LOCATION DETAILED: RIGHT SUPERIOR HELIX
LOCATION DETAILED: RIGHT DORSAL WRIST

## 2025-01-14 ASSESSMENT — LOCATION SIMPLE DESCRIPTION DERM
LOCATION SIMPLE: LEFT UPPER BACK
LOCATION SIMPLE: LEFT ZYGOMA
LOCATION SIMPLE: LEFT FOREHEAD
LOCATION SIMPLE: SCALP
LOCATION SIMPLE: RIGHT FOREARM
LOCATION SIMPLE: RIGHT EAR
LOCATION SIMPLE: RIGHT HAND
LOCATION SIMPLE: LEFT HAND
LOCATION SIMPLE: LEFT FOREARM
LOCATION SIMPLE: RIGHT WRIST
LOCATION SIMPLE: RIGHT SHOULDER

## 2025-01-14 ASSESSMENT — LOCATION ZONE DERM
LOCATION ZONE: ARM
LOCATION ZONE: SCALP
LOCATION ZONE: TRUNK
LOCATION ZONE: EAR
LOCATION ZONE: HAND
LOCATION ZONE: FACE

## 2025-02-03 ENCOUNTER — APPOINTMENT (OUTPATIENT)
Dept: URBAN - METROPOLITAN AREA CLINIC 151 | Facility: CLINIC | Age: 80
Setting detail: DERMATOLOGY
End: 2025-02-03

## 2025-02-03 PROBLEM — C44.719 BASAL CELL CARCINOMA OF SKIN OF LEFT LOWER LIMB, INCLUDING HIP: Status: ACTIVE | Noted: 2025-02-03

## 2025-02-03 PROCEDURE — ? CURETTAGE AND DESTRUCTION

## 2025-02-03 PROCEDURE — 17262 DSTRJ MAL LES T/A/L 1.1-2.0: CPT

## 2025-02-03 PROCEDURE — ? COUNSELING

## 2025-02-03 NOTE — PROCEDURE: CURETTAGE AND DESTRUCTION
Detail Level: Detailed
Biopsy Photograph Reviewed: Yes
Number Of Curettages: 3
Size Of Lesion In Cm: 0.9
Size Of Lesion After Curettage: 1.1
Add Intralesional Injection: No
Concentration (Mg/Ml Or Millions Of Plaque Forming Units/Cc): 0.01
Total Volume (Ccs): 1
Anesthesia Type: 1% lidocaine with epinephrine
Anesthesia Volume In Cc: 1.5
Cautery Type: electrodesiccation
What Was Performed First?: Curettage
Final Size Statement: The size of the lesion after curettage was
Additional Information: (Optional): The wound was cleaned, and a pressure dressing was applied.  The patient received detailed post-op instructions.
Consent was obtained from the patient. The risks, benefits and alternatives to therapy were discussed in detail. Specifically, the risks of infection, scarring, bleeding, prolonged wound healing, nerve injury, incomplete removal, allergy to anesthesia and recurrence were addressed. Alternatives to ED&C, such as: surgical removal and XRT were also discussed.  Prior to the procedure, the treatment site was clearly identified and confirmed by the patient. All components of Universal Protocol/PAUSE Rule completed.
Post-Care Instructions: I reviewed with the patient in detail post-care instructions. Patient is to keep the area dry for 48 hours, and not to engage in any swimming until the area is healed. Should the patient develop any fevers, chills, bleeding, severe pain patient will contact the office immediately.
Bill As A Line Item Or As Units: Line Item

## 2025-04-23 ENCOUNTER — APPOINTMENT (OUTPATIENT)
Dept: CT IMAGING | Age: 80
End: 2025-04-23
Attending: EMERGENCY MEDICINE

## 2025-04-23 ENCOUNTER — HOSPITAL ENCOUNTER (INPATIENT)
Facility: HOSPITAL | Age: 80
LOS: 2 days | Discharge: HOME | End: 2025-04-26
Attending: EMERGENCY MEDICINE | Admitting: HOSPITALIST

## 2025-04-23 ENCOUNTER — APPOINTMENT (OUTPATIENT)
Dept: ULTRASOUND IMAGING | Age: 80
End: 2025-04-23
Attending: EMERGENCY MEDICINE

## 2025-04-23 ENCOUNTER — APPOINTMENT (OUTPATIENT)
Dept: GENERAL RADIOLOGY | Age: 80
End: 2025-04-23
Attending: EMERGENCY MEDICINE

## 2025-04-23 PROBLEM — K40.90 UNILATERAL INGUINAL HERNIA WITHOUT OBSTRUCTION OR GANGRENE, RECURRENCE NOT SPECIFIED: Status: ACTIVE | Noted: 2025-04-23

## 2025-04-23 PROBLEM — D64.9 ANEMIA, UNSPECIFIED TYPE: Status: ACTIVE | Noted: 2025-04-23

## 2025-04-23 PROBLEM — D64.9 ANEMIA: Status: ACTIVE | Noted: 2025-04-23

## 2025-04-23 PROBLEM — R73.9 HYPERGLYCEMIA: Status: ACTIVE | Noted: 2025-04-23

## 2025-04-23 PROBLEM — M54.2 NECK PAIN: Status: ACTIVE | Noted: 2025-04-23

## 2025-04-23 PROBLEM — G44.201 ACUTE INTRACTABLE TENSION-TYPE HEADACHE: Status: ACTIVE | Noted: 2025-04-23

## 2025-04-23 NOTE — ED INITIAL ASSESSMENT (HPI)
Pt to ED with left sided head/neck pain for 4-5 days, pain is now across bilateral shoulders and worsened the last 2 days. Denies fall/trauma area. Pt also c/o lump \"pear\" size to right groin x1 week, increased fatigue.

## 2025-04-23 NOTE — ED PROVIDER NOTES
Patient Seen in: [facility] In [place]      History     Chief Complaint   Patient presents with    Neck Pain    Lump     Stated Complaint: neck pain 2 weeks  and lump and groin area 1 week    Subjective:   HPI    Patient presents with left sided head and neck pain.  The patient states that the pain began in his head behind his left ear about 3 weeks ago.  He states it has gotten progressively worse.  It now radiates down his neck and across his shoulder blades.  He denies any particular pain in the ear.  There is tenderness in the area without swelling.  He states that it hurts to move his head in any direction and he feels like his neck will not support his head.  He has not felt like eating and has been nauseated.  He is very fatigued.  He has had some chills for a couple of days but no fever.  He denies vision change.  He denies focal weakness or any increased numbness.  He has been taking just Tylenol for pain without relief.  He is also complaining of a lump in his right groin that gets worse when he stands up.  He has had regular bowel movements but not today.  He denies difficulty urinating.  History of Present Illness               Objective:     Past Medical History:    Chronic coronary artery disease    Dysfunction of eustachian tube    Gastroesophageal reflux disease    Hyperlipidemia    Hypertension    Hypogonadism in male    Description: Per Dr. Byrne     Hypothyroidism    Non-Hodgkin's lymphoma (HCC)    Osteoarthritis              Past Surgical History:   Procedure Laterality Date    Bowel resection      Colectomy      Hernia surgery      Rotator cuff repair Right     Tonsillectomy                  Social History     Socioeconomic History    Marital status:    Tobacco Use    Smoking status: Former     Current packs/day: 0.00     Types: Cigarettes     Quit date: 1988     Years since quittin.3    Smokeless tobacco: Never   Vaping Use    Vaping status: Never Used    Substance and Sexual Activity    Alcohol use: Yes     Alcohol/week: 0.0 standard drinks of alcohol     Comment: social    Drug use: No                                Physical Exam     ED Triage Vitals [04/23/25 1443]   /62   Pulse 95   Resp 16   Temp 97.8 °F (36.6 °C)   Temp src Oral   SpO2 99 %   O2 Device None (Room air)       Current Vitals:   Vital Signs  BP: 150/68  Pulse: 96  Resp: 18  Temp: 97.8 °F (36.6 °C)  Temp src: Oral    Oxygen Therapy  SpO2: 100 %  O2 Device: None (Room air)        Physical Exam  General: Alert and oriented x3 in moderate distress due to pain.  HEENT: Normocephalic, atraumatic, pupils equal round and reactive to light, hearing aid in left ear, no mastoid tenderness or overlying erythema.  Neck: Tenderness to palpation throughout the left posterior neck and into the occipital region, limited range of motion of the neck due to pain, mild diffuse midline C-spine tenderness.  Cardiovascular: Regular rate and rhythm, no murmurs.  Respiratory: Lungs clear to auscultation.  Abdomen: Soft, nontender, bulging in right groin consistent with inguinal hernia that is reducible, no rebound or guarding, normal active bowel sounds, no CVA tenderness.  Extremities: No CCE.  Skin: Warm and dry.  Neurologic: Cranial nerves intact.  Strength equal in all extremities.  Sensory exam grossly intact.              ED Course     Labs Reviewed   CBC WITH DIFFERENTIAL WITH PLATELET - Abnormal; Notable for the following components:       Result Value    RBC 3.15 (*)     HGB 10.0 (*)     HCT 29.5 (*)     Lymphocyte Absolute 0.73 (*)     Monocyte Absolute 1.68 (*)     All other components within normal limits   COMP METABOLIC PANEL (14) - Abnormal; Notable for the following components:    Glucose 127 (*)     Sodium 130 (*)     Chloride 97 (*)     Calculated Osmolality 271 (*)     All other components within normal limits   URINALYSIS, ROUTINE - Abnormal; Notable for the following components:    Ketones Urine  15 (*)     All other components within normal limits   LACTIC ACID, PLASMA - Normal   SCAN SLIDE     EKG    Rate, intervals and axes as noted on EKG Report.  Rate: 86  Rhythm: Sinus rhythm with first-degree AV block  Reading: No significant change compared to previous         I personally reviewed the chest films and scarring to right upper lobe but no focal pneumonia noted.     Results            US GROIN RIGHT LIMITED (CPT=76882)  Result Date: 4/23/2025  PROCEDURE:  US GROIN RIGHT LIMITED (CPT=76882)  COMPARISON:  None.  INDICATIONS:  neck pain 2 weeks  and lump and groin area 1 week  TECHNIQUE:  Sonography was performed of the clinically requested area of interest.  PATIENT STATED HISTORY: (As transcribed by Technologist)  Patient stated lump to right groin for one week.    FINDINGS:  There is a hernia in the area of concern within the right groin region.  This measures up to approximately 3.3 x 1.8 by 2.0 cm and appears to contain fat.  There is no evidence of obstruction in this region.  This could be best assessed with CT if clinically indicated.            CONCLUSION:  See above.   LOCATION:  [facility]   Dictated by (CST): Anson Paz MD on 4/23/2025 at 7:28 PM     Finalized by (CST): Anson Paz MD on 4/23/2025 at 7:29 PM       CTA BRAIN (CPT=70496)  Result Date: 4/23/2025  PROCEDURE:  CTA BRAIN (CPT=70496)  COMPARISON:  None.  INDICATIONS:  severe left sided headache  TECHNIQUE:  Unenhanced followed by contrast enhanced multislice CT angiography of the brain vasculature using non-ionic contrast. Multiplanar 3D reformatted imaging as well as multiplanar MIP images were obtained. Dose reduction techniques were used. Dose information is transmitted to the ACR (American College of Radiology) NRDR (National Radiology Data Registry) which includes the Dose Index Registry.  PATIENT STATED HISTORY:(As transcribed by Technologist)  Patient has left sided head/neck pain for 4-5 day. Patients pain is now across bilateral  shoulders and worsened the last 2 days. Denies fall/trauma area. Patient has increased fatigue and weakness   CONTRAST USED:  60cc of Isovue 370  FINDINGS:   Noncontrast head CT:  No significant midline shift or mass effect is seen.  No evidence of acute intracranial hemorrhage.  There are probable mild chronic microvascular ischemic changes present with some hypoattenuation present within the periventricular white matter.  CTA head:  The bilateral high cervical, petrous, cavernous and supraclinoid ICA segments appear patent.  Overall mild calcific atherosclerosis is noted of the cavernous segments bilaterally.  Distal branches of the bilateral anterior and middle cerebral arteries are unremarkable.  The basilar artery is normal in caliber.  Distal branches of the bilateral vertebral arteries appear patent.  Distal branches of the bilateral posterior cerebral arteries are unremarkable.  There is no evidence of large vessel intracranial arterial occlusion.            CONCLUSION:   No significant midline shift or mass effect.  No acute intracranial hemorrhage.  Mild chronic microvascular ischemic changes are favored.  If there is persistent clinical concern then consider MRI.  There is no evidence of large vessel intracranial arterial occlusion.    LOCATION:  [facility]   Dictated by (CST): Anson Paz MD on 4/23/2025 at 5:38 PM     Finalized by (CST): Anson Paz MD on 4/23/2025 at 5:43 PM       CT SPINE CERVICAL (CPT=72125)  Result Date: 4/23/2025  PROCEDURE:  CT SPINE CERVICAL (CPT=72125)  COMPARISON:  None.  INDICATIONS:  severe neck pain/weakness  TECHNIQUE:  Noncontrast CT scanning of the cervical spine is performed from the skull base through C7.  Multiplanar reconstructions are generated.  Dose reduction techniques were used. Dose information is transmitted to the ACR (American College of Radiology) NRDR (National Radiology Data Registry) which includes the Dose Index Registry.  PATIENT STATED HISTORY: (As  transcribed by Technologist)  Patient has left sided head/neck pain for 4-5 day. Patients pain is now across bilateral shoulders and worsened the last 2 days. Denies fall/trauma area. Patient has increased fatigue and weakness    FINDINGS:  There is normal cervical lordosis.  No significant spondylolisthesis is present.  Vertebral bodies appear maintained in height.  Mild intervertebral disc space narrowing is present from C2 through C4 and from C5 through T1.  There is no evidence of acute fracture of the cervical spine.  There are overall mild multilevel degenerative changes present with posterior disc osteophyte complex is likely present at C5-6 and C6-7.  Overall mild bilateral neural foraminal stenosis is favored at these levels as well as on the right at C7-T1 due to facet and uncovertebral hypertrophy.  Limited views of the lung apices demonstrate airspace opacity of the right upper lobe which is not well seen.  This could represent an area of scarring but is incompletely visualized.            CONCLUSION:  No evidence of acute fracture of the cervical spine.  Overall mild multilevel degenerative changes are present.  If there is persistent concern then consider follow-up imaging including MRI.  Limited views of the lung apices demonstrate airspace opacity of the right lung apex which is not well assessed on this exam.  This could represent an area of scarring but is incompletely visualized.    LOCATION:  [facility]   Dictated by (CST): Anson Paz MD on 4/23/2025 at 5:32 PM     Finalized by (CST): Anson Paz MD on 4/23/2025 at 5:36 PM       XR CHEST AP PORTABLE  (CPT=71045)  Result Date: 4/23/2025  PROCEDURE:  XR CHEST AP PORTABLE  (CPT=71045)  TECHNIQUE:  AP chest radiograph was obtained.  COMPARISON:  None.  INDICATIONS:  neck pain 2 weeks  and lump in groin area 1 week  PATIENT STATED HISTORY: (As transcribed by Technologist)  Pt c/o left sided cervical pain for past 4-5 days. Pt denies any injury.     FINDINGS:  Surgical clips are seen in the right axilla/right subclavian region on the frontal view.  Orthopedic hardware seen projecting over the right humeral head.  Heart size is within the limits of normal.  Atheromatous calcified plaque is seen within the aorta.  There is mild scarring or atelectasis at the left lung base with minimal blunting in the left costophrenic angle suggesting a tiny left pleural effusion.   The right lung is clear.  Degenerative changes are seen in the spine and both shoulders.            CONCLUSION:  1. Tiny left pleural effusion and left basilar atelectasis or scarring. 2. Postsurgical changes are seen in the right axilla/right upper chest.    LOCATION:  [facility]      Dictated by (CST): Zach Mayfield MD on 4/23/2025 at 4:06 PM     Finalized by (CST): Zach Mayfield MD on 4/23/2025 at 4:08 PM         Medications   morphINE PF 4 MG/ML injection 4 mg (4 mg Intravenous Given 4/23/25 1546)   ondansetron (Zofran) 4 MG/2ML injection 4 mg (4 mg Intravenous Given 4/23/25 1546)   sodium chloride 0.9 % IV bolus 1,000 mL (0 mL Intravenous Stopped 4/23/25 1828)   iopamidol 76% (ISOVUE-370) injection for power injector (60 mL Intravenous Given 4/23/25 1710)   morphINE PF 4 MG/ML injection 4 mg (4 mg Intravenous Given 4/23/25 2026)     The patient was given morphine with Zofran and felt some improvement in his pain.  He was hydrated with normal saline given his history of nausea and poor oral intake.  His sodium and chloride had come back decreased.  The patient got up to walk and initially felt okay but then the pain came back more sharply.  He was given additional morphine.                   MDM      Patient presents with head and neck pain, nausea and groin swelling.  Differential diagnosis includes but is not limited to intracranial hemorrhage, musculoskeletal pain, cervical spine stenosis or herniated disc, inguinal hernia and dehydration.  The patient's CT of the brain does not show  bleeding or aneurysm.  His CT of the neck shows relatively mild degenerative changes.  His pain may be somewhat muscular.  However, it seems severe.  He has some radiating pain into the shoulders.  He is neurologically intact.  Family was concerned about infectious etiologies but he does not have a fever or leukocytosis and the symptoms seem to have progressively worsened over days to weeks.  He is anemic and has low sodium and chloride levels.  This may be related to nutrition/hydration.  He does have a history of malignancy but states that he had a recent PET scan in [place] that came back reassuring.  He does have an inguinal hernia that is not incarcerated.  He has had increased activity with moving from [place] which may be the cause of his symptoms.  However he is weak and frail and still having severe pain.  I discussed his case with Dr. Bergman from the hospitalist service who agreed to admit him for further workup and symptom control.  Patient and wife are happy with this plan.    Admission disposition: 4/23/2025  8:26 PM           Medical Decision Making      Disposition and Plan     Clinical Impression:  1. Acute intractable tension-type headache    2. Neck pain    3. Hyponatremia    4. Anemia, unspecified type    5. Unilateral inguinal hernia without obstruction or gangrene, recurrence not specified         Disposition:  Admit  4/23/2025  8:26 pm    Follow-up:  No follow-up provider specified.        Medications Prescribed:  Current Discharge Medication List          Supplementary Documentation:         Hospital Problems       Present on Admission  Date Reviewed: 6/13/2024          ICD-10-CM Noted POA    * (Principal) Acute intractable tension-type headache G44.201 4/23/2025 Unknown    Anemia D64.9 4/23/2025 Yes    Hyperglycemia R73.9 4/23/2025 Yes

## 2025-04-24 PROBLEM — M54.12 CERVICAL RADICULAR PAIN: Status: ACTIVE | Noted: 2025-04-24

## 2025-04-24 NOTE — PLAN OF CARE
Patient AO x 4.  IVF infusing per order. Voiding without difficulty, mirilax given per patient request.  Pain meds given as ordered, see MAR.  Heat/cold therapy applied to neck. Neuro consult, no new orders. Plan for IVF and pain management.

## 2025-04-24 NOTE — H&P
[facility]     James Roberts Patient Status:  Observation    10/17/1945 MRN [id number]   Location [facility] 3SW-A Attending Richard Bergman MD   Hosp Day # 0 PCP PHYSICIAN NONSTAFF     Chief Complaint: headache    Subjective:    History of Present Illness:     James Roberts is a 79 year old male with hx of hypertension, BPH, hypothyroidism, CAD, hyperlipidemia present to the ED with pain on the left side of his head and neck began 3 weeks ago behind his ear has been progressively worse now radiates down his neck across the shoulder blades.  Patient states that moving his head in any direction hurts.  Patient has been feeling some nausea but no vomiting.  Patient reports some chills a few days ago that is now resolved.  Patient denies any fevers no vision changes.  No numbness or tingling in the upper extremities or any weakness.  Patient denies any chest pain, shortness of breath, palpitations.    History/Other:    Past Medical History:  Past Medical History[1]  Past Surgical History:   Past Surgical History[2]   Family History:   Family History[3]  Social History:    reports that he quit smoking about 37 years ago. His smoking use included cigarettes. He has never used smokeless tobacco. He reports current alcohol use. He reports that he does not use drugs.     Allergies: Allergies[4]    Medications:  Medications Ordered Prior to Encounter[5]    Review of Systems:   A comprehensive review of systems was completed.    Pertinent positives and negatives noted in the HPI.    Objective:   Physical Exam:    /69 (BP Location: Right arm)   Pulse 98   Temp 99.4 °F (37.4 °C) (Oral)   Resp 16   Ht 5' 8\" (1.727 m)   Wt 159 lb (72.1 kg)   SpO2 95%   BMI 24.18 kg/m²   General: No acute distress, Alert  Respiratory: No rhonchi, no wheezes  Cardiovascular: S1, S2. Regular rate and rhythm  Abdomen: Soft, Non-tender, non-distended, positive bowel sounds  Neuro: No new focal  deficits  Extremities: No edema      Results:    Labs:      Labs Last 24 Hours:    Recent Labs   Lab 04/23/25  1551   RBC 3.15*   HGB 10.0*   HCT 29.5*   MCV 93.7   MCH 31.7   MCHC 33.9   RDW 18.6   NEPRELIM 5.76   WBC 8.3   .0       Recent Labs   Lab 04/23/25  1551   *   BUN 11   CREATSERUM 0.75   EGFRCR 92   CA 9.4   ALB 4.0   *   K 4.4   CL 97*   CO2 29.0   ALKPHO 53   AST 16   ALT 18   BILT 0.8   TP 6.3       Estimated Glomerular Filtration Rate: 92 mL/min/1.73m2 (result from lab).    Lab Results   Component Value Date    INR 1 07/21/2014       No results for input(s): \"TROP\", \"TROPHS\", \"CK\" in the last 168 hours.    No results for input(s): \"TROP\", \"PBNP\" in the last 168 hours.    No results for input(s): \"PCT\" in the last 168 hours.    Imaging: Imaging data reviewed in Epic.    Assessment & Plan:      #Intractable migraine  - continue pain medication  - neurology on consult.    # Hypertension  - will continue on amlodipine and atenolol.    # Hyperlipidemia  - will continue on statin therapy.    # Hypthyroidism  - will continue on levothyroxine.    # BPH  - will continue on flomax.      Plan of care discussed with patient at bedside.    Catracho Anne, DO    Supplementary Documentation:     The 21st Century Cures Act makes medical notes like these available to patients in the interest of transparency. Please be advised this is a medical document. Medical documents are intended to carry relevant information, facts as evident, and the clinical opinion of the practitioner. The medical note is intended as peer to peer communication and may appear blunt or direct. It is written in medical language and may contain abbreviations or verbiage that are unfamiliar.                                     [1]   Past Medical History:   Chronic coronary artery disease    Dysfunction of eustachian tube    Gastroesophageal reflux disease    Hyperlipidemia    Hypertension    Hypogonadism in male     Description: Per Dr. Byrne     Hypothyroidism    Non-Hodgkin's lymphoma (HCC)    Osteoarthritis   [2]   Past Surgical History:  Procedure Laterality Date    Bowel resection      Colectomy      Hernia surgery      Rotator cuff repair Right     Tonsillectomy     [3]   Family History  Problem Relation Age of Onset    Alcohol and Other Disorders Associated Other     High Blood Pressure Other     Diabetes Other     Heart Disease Other    [4]   Allergies  Allergen Reactions    Celebrex  [Celecoxib]    [5]   No current facility-administered medications on file prior to encounter.     Current Outpatient Medications on File Prior to Encounter   Medication Sig Dispense Refill    tamsulosin 0.4 MG Oral Cap Take 1 capsule (0.4 mg total) by mouth daily.      levothyroxine 125 MCG Oral Tab Take 1 tablet (125 mcg total) by mouth before breakfast.  0    docusate sodium 100 MG Oral Cap Take 1 capsule (100 mg total) by mouth every other day.      AmLODIPine Besylate 5 MG Oral Tab Take 0.5 tablets (2.5 mg total) by mouth in the morning.      Hypromellose (ARTIFICIAL TEARS OP) Apply to eye.      aspirin 81 MG Oral Tab Take 1 tablet (81 mg total) by mouth every other day. Pt reports taking every other day. MD prescribed daily.      Atorvastatin Calcium 10 MG Oral Tab Take 1 tablet (10 mg total) by mouth nightly.      atenolol (TENORMIN) 25 MG Oral Tab Take 1 tablet (25 mg total) by mouth at bedtime.      Bioflavonoid Products (DESIRAE C OR) Take 500 mg by mouth in the morning.      Multiple Vitamins-Minerals (MULTIVITAMIN OR) Take by mouth in the morning.      pantoprazole 40 MG Oral Tab EC Take 1 tablet (40 mg total) by mouth in the morning and 1 tablet (40 mg total) before bedtime.      Ergocalciferol (VITAMIN D OR) Take by mouth every other day.      Sildenafil Citrate 100 MG Oral Tab Take 100 mg by mouth daily as needed for Erectile Dysfunction.      triamcinolone acetonide 0.025 % External Cream Apply topically 2 (two) times daily.       Cyanocobalamin (VITAMIN B 12 OR) Take by mouth every other day. (Patient not taking: Reported on 4/23/2025)

## 2025-04-24 NOTE — ED QUICK NOTES
Report was given to the medics and the pt was sent to [facility] per S protocol with stable VS.

## 2025-04-24 NOTE — ED QUICK NOTES
Report was called to Macy CORRIGAN @ [id number]. The family and pt were updated on his room number and the ambulance arrival.He is feeling better after being medicated.

## 2025-04-24 NOTE — PLAN OF CARE
Patient admitted from [facility] with intractable headache and neck pain radiating to shoulders . A&Ox4 . Vital signs stable . Patient has severe pain with slight movement . Morphine given with relief . 2 person skin check done with Usama AMBROSE. Patent up to bathroom and voided . Scd's in place . All safety precautions in place . Reminded to \"call don't fall\" . Neurology consult in am . Will continue to monitor .

## 2025-04-24 NOTE — ED QUICK NOTES
Orders for admission, patient is aware of plan and ready to go upstairs. Any questions, please call ED RN Demi at extension [phone].     Patient Covid vaccination status: Unvaccinated     COVID Test Ordered in ED: None    COVID Suspicion at Admission: N/A    Running Infusions: Medication Infusions[1] None    Mental Status/LOC at time of transport: a/ox4    Other pertinent information:   CIWA score: N/A   NIH score:  N/A             [1]

## 2025-04-24 NOTE — ED QUICK NOTES
The pt was repositioned and medicated for his pain. He is aware of the need for admission and was informed that there may be a delay in obtaining a bed d/t high census. He was upset over this information and will be updated on the status.

## 2025-04-24 NOTE — CONSULTS
[facility]  [facility] Neurology Consult Note    James Roberts Patient Status:  Observation    10/17/1945 MRN [id number]   Location [facility] 3SW-A Attending Nina Seth MD   Hosp Day # 0 PCP PHYSICIAN NONSTAFF     REASON FOR EVALUATION:  Neck and back pain    HISTORY OF PRESENT ILLNESS:  Mr. Roberts is a 79-year-old man with hypertension, dyslipidemia, and multiple scalp and face skin cancers with grafting who was brought to the emergency department due to severe pain in his neck that is radiating into his scalp and both of his arms.  He is accompanied by his wife who provides ancillary history.  He has been experiencing shocklike pain to the back of the left side of his head for the last several months.  He notices it mostly in the morning and gets better through the day but he is able to make it worse by trying to look up.  In this context, he has had worsening of this pain that feels as if it is extending from the back of his left head into his neck and into both shoulders and arms.  This was after several days of doing relatively strenuous physical labor.  Now if he moves his head in any direction he has significant pain in the neck and if he tries to look up he has shocklike pain to the back of his head and into both arms.  He has had some relief since coming to the hospital with pain medication.  Nothing like this has happened before.  He is able to move all of his limbs but he does feel a bit generally weak.  He has not had much of an appetite lately.  He has been able to urinate normally but he has not had a bowel movement in the last couple of days.  He is usually regular.    PAST MEDICAL HISTORY:  Past Medical History:    Chronic coronary artery disease    Dysfunction of eustachian tube    Gastroesophageal reflux disease    Hyperlipidemia    Hypertension    Hypogonadism in male    Description: Per Dr. Byrne     Hypothyroidism    Non-Hodgkin's lymphoma (HCC)    Osteoarthritis       PAST SURGICAL  HISTORY:  Past Surgical History:   Procedure Laterality Date    Bowel resection      Colectomy      Hernia surgery      Rotator cuff repair Right     Tonsillectomy         FAMILY HISTORY:  family history includes Alcohol and Other Disorders Associated in an other family member; Diabetes in an other family member; Heart Disease in an other family member; High Blood Pressure in an other family member.    SOCIAL HISTORY:   reports that he quit smoking about 37 years ago. His smoking use included cigarettes. He has never used smokeless tobacco. He reports current alcohol use. He reports that he does not use drugs.    ALLERGIES:  Allergies   Allergen Reactions    Celebrex  [Celecoxib]        MEDICATIONS:  No current outpatient medications on file.     Current Facility-Administered Medications   Medication Dose Route Frequency    amLODIPine (Norvasc) tab 2.5 mg  2.5 mg Oral Daily    [START ON 4/25/2025] aspirin chewable tab 81 mg  81 mg Oral QOD    atenolol (Tenormin) tab 25 mg  25 mg Oral Nightly    atorvastatin (Lipitor) tab 10 mg  10 mg Oral Nightly    levothyroxine (Synthroid) tab 125 mcg  125 mcg Oral Before breakfast    pantoprazole (Protonix) DR tab 40 mg  40 mg Oral BID    tamsulosin (Flomax) cap 0.4 mg  0.4 mg Oral Daily    melatonin tab 3 mg  3 mg Oral Nightly PRN    ondansetron (Zofran) 4 MG/2ML injection 4 mg  4 mg Intravenous Q6H PRN    metoclopramide (Reglan) 5 mg/mL injection 10 mg  10 mg Intravenous Q8H PRN    sodium chloride 0.9% infusion   Intravenous Continuous    acetaminophen (Tylenol Extra Strength) tab 500 mg  500 mg Oral Q4H PRN    acetaminophen (Tylenol) tab 650 mg  650 mg Oral Q4H PRN    Or    HYDROcodone-acetaminophen (Norco) 5-325 MG per tab 1 tablet  1 tablet Oral Q4H PRN    Or    HYDROcodone-acetaminophen (Norco) 5-325 MG per tab 2 tablet  2 tablet Oral Q4H PRN    morphINE PF 2 MG/ML injection 1 mg  1 mg Intravenous Q2H PRN    Or    morphINE PF 2 MG/ML injection 2 mg  2 mg Intravenous Q2H PRN     Or    morphINE PF 4 MG/ML injection 4 mg  4 mg Intravenous Q2H PRN    polyethylene glycol (PEG 3350) (Miralax) 17 g oral packet 17 g  17 g Oral Daily PRN    sennosides (Senokot) tab 17.2 mg  17.2 mg Oral Nightly PRN    bisacodyl (Dulcolax) 10 MG rectal suppository 10 mg  10 mg Rectal Daily PRN    fleet enema (Fleet) rectal enema 133 mL  1 enema Rectal Once PRN       REVIEW OF SYSTEMS:  A 10-point system was reviewed.  Pertinent positives and negatives are noted in HPI.      PHYSICAL EXAMINATION:  VITAL SIGNS: /55 (BP Location: Left arm)   Pulse 80   Temp 98.3 °F (36.8 °C) (Oral)   Resp 16   Ht 68\"   Wt 159 lb (72.1 kg)   SpO2 90%   BMI 24.18 kg/m²   GENERAL:  Patient is a 79 year old male in no acute distress.    Tenderness to palpation diffusely over the back of the scalp, no obvious neuralgic pain with palpation of occipital ridges, left included.    Radicular pain with neck extension as well as Spurling maneuver (axial load on neck).    NEUROLOGICAL:   Mental status: Oriented to person, place, and time  Speech & Language: Fluent, no dysarthria  Comprehension: Intact  Cranial Nerves: VFF, PERRL, EOMI, no nystagmus, facial sensation intact, face symmetric, tongue midline, shoulder shrug equal  Motor: tone, bulk, strength are normal in all flexors and extensors   Sensory: Intact to light touch in all limbs  Coordination: FTN intact  Tendon Reflexes: normal for habitus, no asymmetry  Gait: Deferred    DIAGNOSTIC DATA:  Labs:  Recent Labs   Lab 04/23/25  1551 04/24/25  0607   RBC 3.15* 3.13*   HGB 10.0* 9.5*   HCT 29.5* 29.4*   MCV 93.7 93.9   MCH 31.7 30.4   MCHC 33.9 32.3   RDW 18.6 18.6   NEPRELIM 5.76 6.82   WBC 8.3 9.2   .0 162.0         Recent Labs   Lab 04/23/25  1551 04/24/25  0607   * 141*   BUN 11 10   CREATSERUM 0.75 0.79   EGFRCR 92 91   CA 9.4 9.6   * 133*   K 4.4 4.8   CL 97* 97*   CO2 29.0 28.0         IMAGING:  US GROIN RIGHT LIMITED (CPT=76882)  Result Date:  4/23/2025  CONCLUSION:  See above.   LOCATION:  [facility]   Dictated by (CST): Anson Paz MD on 4/23/2025 at 7:28 PM     Finalized by (CST): Anson Paz MD on 4/23/2025 at 7:29 PM       CTA BRAIN (CPT=70496)  Result Date: 4/23/2025  CONCLUSION:   No significant midline shift or mass effect.  No acute intracranial hemorrhage.  Mild chronic microvascular ischemic changes are favored.  If there is persistent clinical concern then consider MRI.  There is no evidence of large vessel intracranial arterial occlusion.    LOCATION:  [facility]   Dictated by (CST): Anson Paz MD on 4/23/2025 at 5:38 PM     Finalized by (CST): Anson Paz MD on 4/23/2025 at 5:43 PM       CT SPINE CERVICAL (CPT=72125)  Result Date: 4/23/2025  CONCLUSION:  No evidence of acute fracture of the cervical spine.  Overall mild multilevel degenerative changes are present.  If there is persistent concern then consider follow-up imaging including MRI.  Limited views of the lung apices demonstrate airspace opacity of the right lung apex which is not well assessed on this exam.  This could represent an area of scarring but is incompletely visualized.    LOCATION:  [facility]   Dictated by (CST): Anson Paz MD on 4/23/2025 at 5:32 PM     Finalized by (CST): Anson Paz MD on 4/23/2025 at 5:36 PM       XR CHEST AP PORTABLE  (CPT=71045)  Result Date: 4/23/2025  CONCLUSION:  1. Tiny left pleural effusion and left basilar atelectasis or scarring. 2. Postsurgical changes are seen in the right axilla/right upper chest.    LOCATION:  [facility]      Dictated by (CST): Zach Mayfield MD on 4/23/2025 at 4:06 PM     Finalized by (CST): Zach Mayfield MD on 4/23/2025 at 4:08 PM           ASSESSMENT:  Mr. Roberts is a 79-year-old man with hypertension and dyslipidemia who likely has significant spondylotic disease of the cervical spine and has a worsening of radicular pain in the setting of recent physical exertion, no compelling clinical evidence of  myelopathy.    PLAN:  Principal Problem:    Acute intractable tension-type headache  Active Problems:    Gastroesophageal reflux disease    Mixed hyperlipidemia    Essential hypertension    Acquired hypothyroidism    Hyponatremia    Anemia    Hyperglycemia    Neck pain    Anemia, unspecified type    Unilateral inguinal hernia without obstruction or gangrene, recurrence not specified  No urgent neurological diagnostics or therapeutics indicated.    MRI of the cervical spine is reasonable, particularly if we think he has high probability of needing interventional pain management, but he does not show any signs of myelopathy and this is not an urgency.  If he will be in the hospital for a while for pain control then it is worthwhile doing but I would not keep him in the hospital to get it done.    Otherwise he needs anti-inflammatory pain control, physiotherapy, and quality and regularity of sleep, nutrition, hydration and day/night cycle integrity. Ice or heat ad libitum.    We will plan to see him in clinic to see if he is making progress.  Neurology will sign off for now but call back should concerns arise.    Randy Anthony MD

## 2025-04-25 ENCOUNTER — APPOINTMENT (OUTPATIENT)
Dept: CT IMAGING | Facility: HOSPITAL | Age: 80
End: 2025-04-25
Attending: HOSPITALIST

## 2025-04-25 NOTE — PROGRESS NOTES
[facility]   part of [facility]     Hospitalist Progress Note     James Roberts Patient Status:  Inpatient    10/17/1945 MRN [id number]   Location [facility] 3SW-A Attending Nina Seth MD   Hosp Day # 1 PCP PHYSICIAN NONSTAFF     Chief Complaint: headache neck pain shoulders pain    Subjective:     Patient with chest pain/shoulders pain/neck pain/ha    Objective:    Review of Systems:   A comprehensive review of systems was completed; pertinent positive and negatives stated in subjective.    Vital signs:  Temp:  [97.7 °F (36.5 °C)-99.5 °F (37.5 °C)] 98.7 °F (37.1 °C)  Pulse:  [73-83] 79  Resp:  [18-19] 18  BP: (104-138)/(57-69) 136/69  SpO2:  [86 %-97 %] 93 %    Physical Exam:    General: No acute distress  Respiratory: No wheezes, no rhonchi  Cardiovascular: S1, S2, regular rate and rhythm  Abdomen: Soft, Non-tender, non-distended, positive bowel sounds  Neuro: No new focal deficits.   Extremities: No edema      Diagnostic Data:    Labs:  Recent Labs   Lab 25  1551 25  0607   WBC 8.3 9.2   HGB 10.0* 9.5*   MCV 93.7 93.9   .0 162.0       Recent Labs   Lab 25  1551 25  0607   * 141*   BUN 11 10   CREATSERUM 0.75 0.79   CA 9.4 9.6   ALB 4.0  --    * 133*   K 4.4 4.8   CL 97* 97*   CO2 29.0 28.0   ALKPHO 53  --    AST 16  --    ALT 18  --    BILT 0.8  --    TP 6.3  --        Estimated Glomerular Filtration Rate: 91 mL/min/1.73m2 (result from lab).    No results for input(s): \"TROP\", \"TROPHS\", \"CK\" in the last 168 hours.    No results for input(s): \"PTP\", \"INR\" in the last 168 hours.               Microbiology    No results found for this visit on 25.      Imaging: Reviewed in Epic.    Medications: Scheduled Medications[1]    Assessment & Plan:      #Intractable HA  - continue pain medication  - dc Morphine  -ms relaxant added     # Hypertension  - will continue on amlodipine and atenolol.     # Hyperlipidemia  - will continue on statin therapy.      # Hypthyroidism  - will continue on levothyroxine.     # BPH  - will continue on flomax.    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 Roberts is expected to be discharge to: home    The 21st Century Cures Act makes medical notes like these available to patients in the interest of transparency. Please be advised this is a medical document. Medical documents are intended to carry relevant information, facts as evident, and the clinical opinion of the practitioner. The medical note is intended as peer to peer communication and may appear blunt or direct. It is written in medical language and may contain abbreviations or verbiage that are unfamiliar.              **Certification      PHYSICIAN Certification of Need for Inpatient Hospitalization - Initial Certification    Patient will require inpatient services that will reasonably be expected to span two midnight's based on the clinical documentation in H+P.   Based on patients current state of illness, I anticipate that, after discharge, patient will require TBD.           [1]    amLODIPine  2.5 mg Oral Daily    aspirin  81 mg Oral QOD    atenolol  25 mg Oral Nightly    atorvastatin  10 mg Oral Nightly    levothyroxine  125 mcg Oral Before breakfast    pantoprazole  40 mg Oral BID    tamsulosin  0.4 mg Oral Daily

## 2025-04-25 NOTE — PLAN OF CARE
Patient alert and oriented x4. VSS on RA. Pain controlled with PO PRN pain meds and heat/cold packs. Denies new n/t. SCDs, ankle pumps encouraged. Pt up with standby assist. Voiding in bathroom. Tolerating diet, no c/o n/v.     Plan: pain control, CT chest

## 2025-04-25 NOTE — PLAN OF CARE
Patient A&Ox4 . Vital signs stable . On room air . Gets up with standby assist . Pain is controlled with norco and flexeril . Denies any numbness or tingling . Voiding well . Safety precautions in place . Reminded to \"call don't fall\" . Plan for home when pain controlled .

## 2025-04-26 NOTE — PROGRESS NOTES
[facility]   part of [facility]     Hospitalist Progress Note     James Roberts Patient Status:  Inpatient    10/17/1945 MRN [id number]   Location [facility] 3SW-A Attending Nina Seth MD   Hosp Day # 2 PCP PHYSICIAN NONSTAFF     Chief Complaint: headache neck pain shoulders pain    Subjective:     Patient with chest pain/shoulders pain/neck pain/ha    Objective:    Review of Systems:   A comprehensive review of systems was completed; pertinent positive and negatives stated in subjective.    Vital signs:  Temp:  [98.2 °F (36.8 °C)-98.8 °F (37.1 °C)] 98.2 °F (36.8 °C)  Pulse:  [81-96] 83  Resp:  [18] 18  BP: (127-160)/(55-71) 156/63  SpO2:  [93 %-98 %] 98 %    Physical Exam:    General: No acute distress  Respiratory: No wheezes, no rhonchi  Cardiovascular: S1, S2, regular rate and rhythm  Abdomen: Soft, Non-tender, non-distended, positive bowel sounds  Neuro: No new focal deficits.   Extremities: No edema      Diagnostic Data:    Labs:  Recent Labs   Lab 25  1551 25  0607   WBC 8.3 9.2   HGB 10.0* 9.5*   MCV 93.7 93.9   .0 162.0       Recent Labs   Lab 25  1551 25  0607 25  1451   * 141*  --    BUN 11 10  --    CREATSERUM 0.75 0.79 0.73   CA 9.4 9.6  --    ALB 4.0  --   --    * 133*  --    K 4.4 4.8  --    CL 97* 97*  --    CO2 29.0 28.0  --    ALKPHO 53  --   --    AST 16  --   --    ALT 18  --   --    BILT 0.8  --   --    TP 6.3  --   --        Estimated Glomerular Filtration Rate: 93 mL/min/1.73m2 (result from lab).    No results for input(s): \"TROP\", \"TROPHS\", \"CK\" in the last 168 hours.    No results for input(s): \"PTP\", \"INR\" in the last 168 hours.               Microbiology    No results found for this visit on 25.      Imaging: Reviewed in Epic.    Medications: Scheduled Medications[1]    Assessment & Plan:      #Intractable HA  - continue pain medication  - dc Morphine  -ms relaxant added     # Hypertension  - will continue on  amlodipine and atenolol.     # Hyperlipidemia  - will continue on statin therapy.     # Hypthyroidism  - will continue on levothyroxine.     # BPH  - will continue on flomax.    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 Roberts is expected to be discharge to: home    The 21st Century Cures Act makes medical notes like these available to patients in the interest of transparency. Please be advised this is a medical document. Medical documents are intended to carry relevant information, facts as evident, and the clinical opinion of the practitioner. The medical note is intended as peer to peer communication and may appear blunt or direct. It is written in medical language and may contain abbreviations or verbiage that are unfamiliar.              **Certification      PHYSICIAN Certification of Need for Inpatient Hospitalization - Initial Certification    Patient will require inpatient services that will reasonably be expected to span two midnight's based on the clinical documentation in H+P.   Based on patients current state of illness, I anticipate that, after discharge, patient will require TBD.           [1]    acetaminophen  500 mg Oral TID    amLODIPine  2.5 mg Oral Daily    aspirin  81 mg Oral QOD    atenolol  25 mg Oral Nightly    atorvastatin  10 mg Oral Nightly    levothyroxine   125 mcg Oral Before breakfast    pantoprazole  40 mg Oral BID    tamsulosin  0.4 mg Oral Daily

## 2025-04-26 NOTE — PLAN OF CARE
A&Ox4. VSS on RA. /IS. SCDs, ankle pumps encouraged. Tolerating diet, last BM 4/23. Voiding freely. Pain mananged with current medications. Up standby assist. Will be seen by pulm in am. Patient updated on POC, verbalized agreement. Safety precautions in place, pt instructed to call for assistance, call light within reach.

## 2025-04-26 NOTE — PHYSICAL THERAPY NOTE
PHYSICAL THERAPY EVALUATION - INPATIENT     Room Number: 383/383-A  Evaluation Date: 4/26/2025  Type of Evaluation: Initial  Physician Order: PT Eval and Treat    Presenting Problem: acute intractable tension-type headache  Co-Morbidities : HTN, HLD, hypothyroidism, BPH, history of lymphoma with recurrence  Reason for Therapy: Mobility Dysfunction and Discharge Planning    PHYSICAL THERAPY ASSESSMENT   Patient is a 79 year old male admitted 4/23/2025 for L sided head/neck pain beginning 3 weeks ago, now radiating across both shoulders and posterior scalp behind his ear.   Patient is currently functioning below baseline with bed mobility, transfers, gait, stair negotiation, maintaining seated position, standing prolonged periods, and decreased functional activity tolerance . Prior to admission, patient's baseline is fully independent, recently moved from [place] and was lifting heavy boxes.     Patient will benefit from continued skilled PT Services at discharge to promote prior level of function and safety with additional support and return home with OP PT.    PLAN  Patient has been evaluated and presents with no skilled Physical Therapy needs at this time.  Patient discharged from Physical Therapy services.  Please re-order if a new functional limitation presents during this admission.    PT Device Recommendation: Rolling walker    GOALS  Patient was able to achieve the following goals ...    Patient was able to transfer Safely with RW and supervision.   Patient able to ambulate on level surfaces Safely with RW and supervision.      HOME SITUATION  [facility]  Home Layout: One level  Stairs to Enter : 1   Railing: Yes (\"the door jamb\")    Stairs to Bedroom: 0         Lives With: Spouse    Drives: Yes   Patient Regularly Uses: Glasses, Hearing aides     Prior Level of [place]: Patient reports he is typically independent at baseline. Recently moved here permanently from [place] in which he assisted  with packing and moving heavy boxes.     SUBJECTIVE  \"It feels like someone is pulling my hair all the time at the side of my head (L side).\"     OBJECTIVE  Precautions: None  Fall Risk: Standard fall risk    WEIGHT BEARING RESTRICTION     PAIN ASSESSMENT  Ratin  Location: L side of my head and \"I can't turn my head because it hurts and it hurts to open my mouth.\"  Management Techniques: Activity promotion, Repositioning (goes between hot and cold packs)    COGNITION  Overall Cognitive Status:  WFL - within functional limits  Following Commands:  follows all commands and directions without difficulty    RANGE OF MOTION AND STRENGTH ASSESSMENT  Upper extremity ROM and strength are within functional limits     Lower extremity ROM is within functional limits     Lower extremity strength is within functional limits     BALANCE  Static Sitting: Good  Dynamic Sitting: Fair  Static Standing: Fair +  Dynamic Standing: Fair +    ADDITIONAL TESTS                                    ACTIVITY TOLERANCE  Pulse: 83  Heart Rate Source: Monitor                   O2 WALK  Oxygen Therapy  SPO2% on Room Air at Rest: 94    NEUROLOGICAL FINDINGS  Neurological Findings: Sensation           Sensation: patient denies any numbness or tingling in the B UE/LE's         AM-PAC '6-Clicks' INPATIENT SHORT FORM - BASIC MOBILITY  How much difficulty does the patient currently have...  Patient Difficulty: Turning over in bed (including adjusting bedclothes, sheets and blankets)?: A Little   Patient Difficulty: Sitting down on and standing up from a chair with arms (e.g., wheelchair, bedside commode, etc.): A Little   Patient Difficulty: Moving from lying on back to sitting on the side of the bed?: A Little   How much help from another person does the patient currently need...   Help from Another: Moving to and from a bed to a chair (including a wheelchair)?: A Little   Help from Another: Need to walk in hospital room?: A Little   Help from  Another: Climbing 3-5 steps with a railing?: A Little       AM-PAC Score:  Raw Score: 18   Approx Degree of Impairment: 46.58%   Standardized Score (AM-PAC Scale): 43.63   CMS Modifier (G-Code): CK    FUNCTIONAL ABILITY STATUS  Gait Assessment   Functional Mobility/Gait Assessment  Gait Assistance: Supervision  Distance (ft): 150  Assistive Device: Rolling walker  Pattern: Within Functional Limits  Stairs: Stoop/curb  Stoop/Curb: patient ascend/descend 1 small curb step with single handhold support from PT    Skilled Therapy Provided     Bed Mobility:  Rolling: instructed in lorgoll technique  Supine to sit: instructed in logroll technique   Sit to supine: instructed in logroll technique     Transfer Mobility:  Sit to stand: supervision, RW   Stand to sit: supervision, RW  Gait = supervision, RW, x150 ft.    Therapist's comments: Patient presents to PT supine in bed with decreased active cervical ROM and increased pain. Educated patient in logroll for cervical spine protection for bed mobility. Instructed patient in gentle active cervical ROM exercises, handouts provided with explanation - patient and significant other at bedside voiced understanding and patient able to demonstrate although limited by pain. Steady gait with use of RW, thus encouraged use upon dc home. Educated on benefits of OP PT referral, icing, heat, gentle massage to upper shoulder region bilaterally - patient and significant other again voiced understanding. Patient requesting return to supine in bed vs. The chair at the end of the session. Patient is functionally safe for dc home with additional support as needed and OP PT (referral written). RN updated.     Exercise/Education Provided:  Bed mobility  Body mechanics  Energy conservation  Functional activity tolerated  Gait training  Posture  Transfer training  Cervical AROM/gentle stretching    Patient End of Session: In bed, Needs met, Call light within reach, RN aware of session/findings,  All patient questions and concerns addressed, Hospital anti-slip socks, Family present    Patient Evaluation Complexity Level:  History Low - no personal factors and/or co-morbidities   Examination of body systems Moderate - addressing a total of 3 or more elements   Clinical Presentation  Moderate - Evolving   Clinical Decision Making Moderate Complexity       PT Session Time: 55 minutes  Gait Training: 10 minutes  Therapeutic Activity: 10 minutes  Neuromuscular Re-education: 0 minutes  Therapeutic Exercise: 5 minutes

## 2025-04-26 NOTE — PLAN OF CARE
Alert and oriented x 4. Vitals stable on room air. Pain managed on PO medications.Voiding without difficulty. Last BM 04/24. Tolerating regular diet. SCD's on bilaterally. Safety precautions in place. Plan for discharge with OP PT, f/u with neurology outpatient, oncology outpatient. Plan of care discussed with patient, friend at bedside to give him a raid home.  IV removed, discharge instructions given, script for Flexeril sent with patient.

## 2025-04-29 NOTE — ED QUICK NOTES
Patient diuresed approximately 2,200 in ED. Urine appears yellow.     Transport bedside to transport patient to CTU-8

## 2025-04-29 NOTE — PROGRESS NOTES
Patient was transported to the ER via wheelchair in stable condition accompanied by wife. Report was given to MEENU Ponce.

## 2025-04-29 NOTE — ED INITIAL ASSESSMENT (HPI)
Patient sent to ED from TCU after discharge for SOB, cough, confusion, weakness, hallucinations per family member

## 2025-04-29 NOTE — PROGRESS NOTES
04/29/25 1712 04/29/25 1714 04/29/25 1717   Vital Signs   BP (!) 179/70 158/86 152/74   MAP (mmHg) (!) 101 (!) 106 97   BP Location Left arm Left arm Left arm   BP Method Automatic Automatic Automatic   Patient Position Lying Sitting Standing

## 2025-04-29 NOTE — H&P
[facility]     James Roberts Patient Status:  Emergency    10/17/1945 MRN [id number]   Location [facility] EMERGENCY DEPARTMENT Attending Nigel Milligan MD   Hosp Day # 0 PCP PHYSICIAN NONSTAFF     Chief Complaint: Dyspnea    Subjective:    History of Present Illness:     James Roberts is a 79 year old male with past medical history of CAD, hypertension, hyperlipidemia, hypothyroidism, non-Hodgkin's lymphoma who presents ED for dyspnea.  Patient was seen in outpatient setting and found to have low pulse ox.  He was placed on 2 L oxygen and sent to ER for further evaluation.  He denies any fevers, chills, nausea, vomiting, abdominal pain, headache.    History/Other:    Past Medical History:  Past Medical History[1]  Past Surgical History:   Past Surgical History[2]   Family History:   Family History[3]  Social History:    reports that he quit smoking about 37 years ago. His smoking use included cigarettes. He has never used smokeless tobacco. He reports current alcohol use. He reports that he does not use drugs.     Allergies: Allergies[4]    Medications:  Medications Ordered Prior to Encounter[5]    Review of Systems:   A comprehensive review of systems was completed.    Pertinent positives and negatives noted in the HPI.    Objective:   Physical Exam:    BP (!) 176/80   Pulse 81   Temp 97.9 °F (36.6 °C) (Temporal)   Resp 18   SpO2 98%   General: No acute distress, Alert  Respiratory: Bilateral crackles  Cardiovascular: S1, S2. Regular rate and rhythm  Abdomen: Soft, Non-tender, non-distended, positive bowel sounds  Neuro: No new focal deficits  Extremities: Trace pedal edema    Results:    Labs:      Labs Last 24 Hours:    Recent Labs   Lab 25  1551 25  0607 25  1129   RBC 3.15* 3.13* 3.08*   HGB 10.0* 9.5* 9.2*   HCT 29.5* 29.4* 27.8*   MCV 93.7 93.9 90.3   MCH 31.7 30.4 29.9   MCHC 33.9 32.3 33.1   RDW 18.6 18.6 18.6   NEPRELIM 5.76 6.82 5.71    WBC 8.3 9.2 7.8   .0 162.0 181.0       Recent Labs   Lab 04/23/25  1551 04/24/25  0607 04/25/25  1451 04/29/25  1129   * 141*  --  102*   BUN 11 10  --  11   CREATSERUM 0.75 0.79 0.73 0.65*   EGFRCR 92 91 93 96   CA 9.4 9.6  --  9.8   ALB 4.0  --   --  4.3   * 133*  --  123*   K 4.4 4.8  --  4.0   CL 97* 97*  --  92*   CO2 29.0 28.0  --  28.0   ALKPHO 53  --   --  177*   AST 16  --   --  42*   ALT 18  --   --  60*   BILT 0.8  --   --  1.5*   TP 6.3  --   --  6.6       Estimated Glomerular Filtration Rate: 96 mL/min/1.73m2 (result from lab).    Lab Results   Component Value Date    INR 1.21 (H) 04/29/2025    INR 1 07/21/2014       Recent Labs   Lab 04/29/25  1129   TROPHS 17       Recent Labs   Lab 04/29/25  1129   PBNP 3,440*       No results for input(s): \"PCT\" in the last 168 hours.    Imaging: Imaging data reviewed in Epic.    Assessment & Plan:      #Acute hypoxic respiratory failure  #Possible new onset CHF  - Chest x-ray reviewed and shows trace bilateral pleural effusions  - CT chest from 4/25 reviewed and shows bilateral pleural effusions right greater than left  - Repeat CT chest ordered  - proBNP elevated  - IV Lasix  - Strict I's and O's, daily weights, fluid and salt restriction  - Cardiology consulted    #Elevated D-dimer  - CT chest ordered    #Hyponatremia  - Possibly in setting of fluid overload  - Monitor with diuresis    #CAD  - Aspirin, statin    #Hypertension  - Amlodipine, atenolol    #Hyperlipidemia  - Statin    #Hypothyroidism  - Levothyroxine    #Non-Hodgkin's lymphoma    Plan of care discussed with patient    Jing Reaves DO    Supplementary Documentation:     The 21st Century Cures Act makes medical notes like these available to patients in the interest of transparency. Please be advised this is a medical document. Medical documents are intended to carry relevant information, facts as evident, and the clinical opinion of the practitioner. The medical note is intended  as peer to peer communication and may appear blunt or direct. It is written in medical language and may contain abbreviations or verbiage that are unfamiliar.                                       [1]   Past Medical History:   Chronic coronary artery disease    Dysfunction of eustachian tube    Gastroesophageal reflux disease    Hyperlipidemia    Hypertension    Hypogonadism in male    Description: Per Dr. Byrne     Hypothyroidism    Non-Hodgkin's lymphoma (HCC)    Osteoarthritis   [2]   Past Surgical History:  Procedure Laterality Date    Bowel resection      Colectomy      Hernia surgery      Rotator cuff repair Right     Tonsillectomy     [3]   Family History  Problem Relation Age of Onset    Alcohol and Other Disorders Associated Other     High Blood Pressure Other     Diabetes Other     Heart Disease Other    [4]   Allergies  Allergen Reactions    Celebrex  [Celecoxib]    [5]   No current facility-administered medications on file prior to encounter.     Current Outpatient Medications on File Prior to Encounter   Medication Sig Dispense Refill    cyclobenzaprine 5 MG Oral Tab Take 1 tablet (5 mg total) by mouth 3 (three) times daily as needed for Muscle spasms. 15 tablet 0    tamsulosin 0.4 MG Oral Cap Take 1 capsule (0.4 mg total) by mouth daily.      levothyroxine 125 MCG Oral Tab Take 1 tablet (125 mcg total) by mouth before breakfast.  0    docusate sodium 100 MG Oral Cap Take 1 capsule (100 mg total) by mouth every other day.      AmLODIPine Besylate 5 MG Oral Tab Take 0.5 tablets (2.5 mg total) by mouth in the morning.      Hypromellose (ARTIFICIAL TEARS OP) Apply to eye.      Sildenafil Citrate 100 MG Oral Tab Take 100 mg by mouth daily as needed for Erectile Dysfunction.      triamcinolone acetonide 0.025 % External Cream Apply topically 2 (two) times daily.      aspirin 81 MG Oral Tab Take 1 tablet (81 mg total) by mouth every other day. Pt reports taking every other day. MD prescribed daily.       Atorvastatin Calcium 10 MG Oral Tab Take 1 tablet (10 mg total) by mouth nightly.      atenolol (TENORMIN) 25 MG Oral Tab Take 1 tablet (25 mg total) by mouth at bedtime.      Bioflavonoid Products (DESIRAE C OR) Take 500 mg by mouth in the morning.      Multiple Vitamins-Minerals (MULTIVITAMIN OR) Take by mouth in the morning.      pantoprazole 40 MG Oral Tab EC Take 1 tablet (40 mg total) by mouth in the morning and 1 tablet (40 mg total) before bedtime.      Ergocalciferol (VITAMIN D OR) Take by mouth every other day.

## 2025-04-29 NOTE — PROGRESS NOTES
TCM VISIT  [facility] TRANSITIONAL CARE CLINIC      HISTORY   HPI: James Roberts is a 79 year old male here today for hospital follow up for neck pain, acute pain of BL shoulders, acute intractable tension-type headache, HTN, non-Hodgkin's lymphoma, unilateral inguinal hernia without obstruction or gangrene, constipation, anemia, hyponatremia, hypoxia, acute cough, shortness of breath, BL LE edema, situational anxiety.  James Roberts was discharged from [facility]  to Home or Self Care.  Admit Date: 4/23/25. Discharge Date: 4/26/25.     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  James Roberts was discharge with Flexeril OP PT and a referral to the [facility] for continued follow up.      Today, patient is being seen for hospital follow-up.  He reports doing okay since discharge.  He is accompanied by his wife at time of exam.    He presented to ED with pain on the left side of his head and neck that began 3 weeks ago as well as behind his ears that has progressively worsened and now radiates down his neck and across the shoulder blades.  He reports that moving his head in any direction hurts.  He is feeling some nausea but no vomiting.  He reports some chills a few days prior that have now resolved.  He was admitted and treated with supportive care.  He does not follow-up with any PCP or oncology.  Advised to see oncology and pulmonary and referred to Dr. Lozano and Dr. Salinas.  He was explained the chronic changes seen on his CT chest, need for new PET scan as outpatient.  He was drowsy with  muscle relaxants and narcotics and he was advised to use them with caution.  He improved and was cleared by consultants and discharged home in stable condition.    Interactive contact within 2 business days post discharge first initiated on Date: 4/29/2025      Allergies:  Allergies[1]   Current Meds:  Current Outpatient Medications   Medication Sig Dispense Refill    cyclobenzaprine 5 MG Oral Tab Take 1 tablet (5 mg total) by mouth 3 (three) times daily as needed for Muscle spasms. 15 tablet 0    tamsulosin 0.4 MG Oral Cap Take 1 capsule (0.4 mg total) by mouth daily.      levothyroxine 125 MCG Oral Tab Take 1 tablet (125 mcg total) by mouth before breakfast.  0    docusate sodium 100 MG Oral Cap Take 1 capsule (100 mg total) by mouth every other day.      AmLODIPine Besylate 5 MG Oral Tab Take 0.5 tablets (2.5 mg total) by mouth in the morning.      Hypromellose (ARTIFICIAL TEARS OP) Apply to eye.      Sildenafil Citrate 100 MG Oral Tab Take 100 mg by mouth daily as needed for Erectile Dysfunction.      triamcinolone acetonide 0.025 % External Cream Apply topically 2 (two) times daily.      aspirin 81 MG Oral Tab Take 1 tablet (81 mg total) by mouth every other day. Pt reports taking every other day. MD prescribed daily.      Atorvastatin Calcium 10 MG Oral Tab Take 1 tablet (10 mg total) by mouth nightly.      atenolol (TENORMIN) 25 MG Oral Tab Take 1 tablet (25 mg total) by mouth at bedtime.      Bioflavonoid Products (DESIRAE C OR) Take 500 mg by mouth in the morning.      Multiple Vitamins-Minerals (MULTIVITAMIN OR) Take by mouth in the morning.      pantoprazole 40 MG Oral Tab EC Take 1 tablet (40 mg total) by mouth in the morning and 1 tablet (40 mg total) before bedtime.      Ergocalciferol (VITAMIN D OR) Take by mouth every other day.       No current facility-administered medications for this visit.       HISTORY: reconciled and reviewed with patient  Past Medical History[2]   Past Surgical History[3]    Family History[4]   Short Social Hx on File[5]     Imaging & Consults:  CTA CHEST + CT ABD (W) + CT PEL (W) (CPT=71275/18355)  Result Date: 4/29/2025  CONCLUSION:  1. Negative for pulmonary embolism. 2. Moderate to large layering bilateral pleural effusions, increased.  Mild associated basilar consolidation may be passive atelectasis.  The potential for pneumonia should be correlated with clinical suspicion for infection. 3. Stable right chest wall mass. 4. Stable mediastinal lymph nodes.  Single, subcarinal enlarged by size criteria. 5. Markedly distended urinary bladder.  Please correlate for retention. 6. Uncomplicated colonic diverticulosis. 7. Details as above.  Continued clinical correlation recommended.    LOCATION:  [facility]   Dictated by (CST): River Pan MD on 4/29/2025 at 2:38 PM     Finalized by (CST): River Pan MD on 4/29/2025 at 2:54 PM       XR CHEST AP PORTABLE  (CPT=71045)  Result Date: 4/29/2025  CONCLUSION:  1. Mild interstitial opacities which may represent mild edema. 2. Trace bilateral pleural effusions with bilateral basilar atelectasis/infiltrates.    LOCATION:  [id number]      Dictated by (CST): Eloisa Almonte MD on 4/29/2025 at 11:52 AM     Finalized by (CST): Eloisa Almonte MD on 4/29/2025 at 11:53 AM       CT CHEST (W+WO) (HCN=75453)  Result Date: 4/25/2025  CONCLUSION:  There are bilateral pleural effusions right greater than left with atelectasis/consolidation in the lung bases.  Right apical pleural parenchymal scarring with more focal pleural based consolidative process measuring 2.0 x 1.9 x 1.5 cm in the anterior upper lobe which may represent post radiation change.  Short-term follow-up recommended to ensure stability.  Mildly enlarged subcarinal lymph node.  There is a nonenhancing ovoid masslike area/collection arising from the right upper chest wall anterior to the right 1st through 3rd ribs measuring 7.3 x 5.7 x 2.1 cm.  This is adjacent to surgical clips.  This may  represent post surgical seroma/liquefying hematoma.  There is some subcutaneous edema in the right supraclavicular region with additional soft tissue stranding surrounding the right  sternoclavicular joint.  Clinical correlation recent surgical history/procedure history.  with Infection cannot entirely be excluded in the appropriate clinical setting.  LOCATION:  [facility]   Dictated by (CST): Rhonda Carranza MD on 4/25/2025 at 6:24 PM     Finalized by (CST): Rhonda Carranza MD on 4/25/2025 at 6:35 PM       CTA BRAIN (CPT=70496)  Addendum Date: 4/25/2025  ADDENDUM:  Three-dimensional image processing was completed using a separate workstation under concurrent supervision.  Dictated by (CST): Anson Paz MD on 4/25/2025 at 8:25 AM     Finalized by (CST): Anson Paz MD on 4/25/2025 at 8:25 AM             Result Date: 4/25/2025  CONCLUSION:   No significant midline shift or mass effect.  No acute intracranial hemorrhage.  Mild chronic microvascular ischemic changes are favored.  If there is persistent clinical concern then consider MRI.  There is no evidence of large vessel intracranial arterial occlusion.    LOCATION:  [facility]   Dictated by (CST): Anson Paz MD on 4/23/2025 at 5:38 PM     Finalized by (CST): Anson Paz MD on 4/23/2025 at 5:43 PM       US GROIN RIGHT LIMITED (CPT=76882)  Result Date: 4/23/2025  CONCLUSION:  See above.   LOCATION:  [facility]   Dictated by (CST): Ansno Paz MD on 4/23/2025 at 7:28 PM     Finalized by (CST): Anson Paz MD on 4/23/2025 at 7:29 PM       CT SPINE CERVICAL (CPT=72125)  Result Date: 4/23/2025  CONCLUSION:  No evidence of acute fracture of the cervical spine.  Overall mild multilevel degenerative changes are present.  If there is persistent concern then consider follow-up imaging including MRI.  Limited views of the lung apices demonstrate airspace opacity of the right lung apex which is not well assessed on this exam.  This could represent an area of scarring but is incompletely  visualized.    LOCATION:  [facility]   Dictated by (CST): Anson Paz MD on 4/23/2025 at 5:32 PM     Finalized by (CST): Anson Paz MD on 4/23/2025 at 5:36 PM       XR CHEST AP PORTABLE  (CPT=71045)  Result Date: 4/23/2025  CONCLUSION:  1. Tiny left pleural effusion and left basilar atelectasis or scarring. 2. Postsurgical changes are seen in the right axilla/right upper chest.    LOCATION:  [facility]      Dictated by (CST): Zach Mayfield MD on 4/23/2025 at 4:06 PM     Finalized by (CST): Zach Mayfield MD on 4/23/2025 at 4:08 PM         Lab Results   Component Value Date/Time    WBC 5.6 04/30/2025 05:02 AM    HGB 10.1 (L) 04/30/2025 05:02 AM    HCT 29.6 (L) 04/30/2025 05:02 AM    .0 04/30/2025 05:02 AM     (H) 05/01/2025 05:17 AM     (H) 05/01/2025 05:17 AM     (L) 05/01/2025 05:17 AM     (L) 05/01/2025 05:17 AM    K 4.5 05/01/2025 05:17 AM    K 4.5 05/01/2025 05:17 AM    CL 91 (L) 05/01/2025 05:17 AM    CL 91 (L) 05/01/2025 05:17 AM    CO2 27.0 05/01/2025 05:17 AM    CO2 27.0 05/01/2025 05:17 AM    BUN 14 05/01/2025 05:17 AM    BUN 14 05/01/2025 05:17 AM    CREATSERUM 0.78 05/01/2025 05:17 AM    CREATSERUM 0.78 05/01/2025 05:17 AM    CA 9.2 05/01/2025 05:17 AM    CA 9.2 05/01/2025 05:17 AM    MG 1.9 05/01/2025 05:17 AM    PHOS 4.2 05/01/2025 05:17 AM    ALB 4.1 05/01/2025 05:17 AM    ALT 46 05/01/2025 05:17 AM    AST 39 (H) 05/01/2025 05:17 AM    INR 1.21 (H) 04/29/2025 11:29 AM       Immunization History   Administered Date(s) Administered    FLUAD High Dose 65 yr and older (68970) 11/07/2018    High Dose Fluzone Influenza Vaccine, 65yr+ PF 0.5mL (68854) 11/08/2014    Influenza 10/07/2011, 10/01/2017    Moderna Covid-19 Vaccine 50mcg/0.5ml 12yrs+ 11/14/2023, 10/29/2024    Pneumococcal (Prevnar 13) 10/01/2017, 08/02/2018    Pneumovax 23 05/13/2013       ROS:  GENERAL: energy fair/stable, denies fever, + reports chills,  + generalized weakness  SKIN: denies any skin changes  EYES:  denies blurred vision, eye pain  HEENT: denies ear pain, loss of hearing, sore throat  RESPIRATORY: + New onset cough, + dyspnea with exertion  CARDIOVASCULAR: denies syncope, chest pain, + fatigue, denies palpitations   GI: denies abdominal pain, melena, + improving constipation, denies diarrhea, nausea, vomiting   : + Some difficulty urinating, + occasionally not emptying bladder fully  MUSCULOSKELETAL: + BL neck/behind BL ear pain that radiates to BL shoulder blades, states normal range of motion of extremities, + increased pain with movement of head  NEUROLOGIC: denies confusion, + balance difficulty, + left head pain  PSYCHIATRIC: denies depression, + situational anxiety  HEMATOLOGIC: + anemia, + bruising, denies bleeding    PHYSICAL EXAM:  Vitals:    04/29/25 1023   BP: 130/66   Pulse: 90   Resp: 16   Temp: 99 °F (37.2 °C)       GENERAL: well developed, well nourished, in no apparent distress, fair/good historian, fiancé is good historian  INTEGUMENTARY: warm, dry, no rashes  HEENT: atraumatic, normocephalic, sclera white, conjunctivae pink  NECK: supple  CHEST: no chest tenderness  LUNGS: clear to auscultation bilaterally with BL bases diminished, no wheezes, rhonchi or rales, tachypneic respiratory effort, + applied O2 at 2L NC during exam  CARDIO: S1, S2, RRR without audible murmur  GI: + BS to all quadrants, no tenderness  MUSCULOSKELETAL: + ROM in all joints, + had/BL neck/BL behind ears/BL shoulder blades/radiation down neck pain   EXTREMITIES: no cyanosis, or edema  NEURO: Oriented x3  PSYCHIATRIC: appropriate affect    ASSESSMENT/ PLAN:   1. Neck pain/acute pain of BL shoulders/acute intractable tension-type headache  Was treated supportively and with improvement in symptoms  Was seen by neurology with no further workup and to follow-up outpatient  CRP elevated at 27.50  Still having left sided head pain  Still having BL neck pain/BL behind the ear pain that radiates down the neck to BL shoulder  blades that is worse with movement  Patient reports pain today is a 2/10 and has not taken any Flexeril since Sunday  Holding  Cyclobenzaprine 5 mg up to 3 times daily-caused hallucinations  Discussed anti-inflammatory medication-but patient to return to ED today for acute hypoxia/cough/shortness of breath/BL LE edema for further evaluation  Reports occasional chills with no fever  Recommended OP PT  Reports ongoing weakness/fatigue  Reports not getting much sleep-2-3 hours per night due to pain  Adequate nutrition/hydration per neurology  Needs to get day/night cycle back to normal  Reports going to bed at 10 PM and getting up around 7:30 AM but waking up throughout the night-reports due to anxiety due to stressors/medical condition  Tolerating an oral diet  Appetite is good/drinking well  Moving bowels-improving constipation/passing gas  Follow-up with neurology Dr. Santiago on 6/23 at 1 PM  Follow-up with oncology Dr. Clarke on 5/1 at 10 AM  Established with PCP Dr. Humphrey on 5/29 at 2 PM  All pertinent hospital records, labs, radiology from current hospitalization reviewed    2. Essential hypertension  BP stable at today's exam at 130/66 with HR of 90  Continue to monitor BP 1-3 times per week and keep log of BP and HR to bring with to all follow-ups for review  Continue  Amlodipine 2.5 mg daily  Atenolol 25 mg at bedtime  Continue to monitor for S/S of hyper hypotension    3. Non-Hodgkin lymphoma of intra-abdominal lymph nodes, unspecified non-Hodgkin lymphoma type (HCC)  Imaging significant for nonenhancing ovoid masslike area/collection arising from the right upper chest wall anterior to the right 1st through 3rd ribs measuring 7.3 x 5.7 x 2.1 cm-this is adjacent to surgical clips-may represent postsurgical seroma/liquefying hematoma-some subcutaneous edema in the right subclavicular region with additional soft tissue stranding surrounding the right sternoclavicular joint-infection cannot entirely be excluded  in the appropriate clinical setting  Referral placed for oncology/Hematology Referral - In Network-to evaluate for recurrent lymphoma  Follow-up with oncology as directed    3. Unilateral inguinal hernia without obstruction or gangrene, recurrence not specified  Ultrasound of the right groin shows a hernia in the area of concern within the right groin region measuring approximately 3.3 x 1.8 x 2.0 cm and appears to contain fat-no evidence of obstruction in this region  Establish with PCP for further management    4. Constipation, unspecified constipation type  Resolving  Reports last BM was yesterday  Continue  Colace 100 mg every other day for stool softener  Continue to monitor for any ongoing/recurrent symptoms    5. Hyponatremia  Was treated with gentle hydration  Continue to monitor labs as directed    6. Anemia, unspecified type  Stable  Hemoglobin prior to discharge stable at 9.5  Continue to monitor labs as directed    7. Hypoxia/acute cough/shortness of breath/BL LE edema/hallucinations  Was hypoxic on presentation to office with O2 sat of 85% on room air  Patient placed on O2 at 2L NC with improvement in saturations to 95%  Has new onset cough that is not productive  New onset shortness of breath even at rest  Noted with 2+ BL LE edema up to mid calves  Per fiancé patient has been hallucinating with Flexeril-last dose on Saturday  Fiancé reports having ongoing confusion and confusion noted at today's exam-may be due to hypoxia  Long discussion with patient about hypoxia and need to return to ED for further evaluation of new onset hypoxia with URI symptoms  Patient was finally agreeable and was taken via wheelchair to ED by RN in office  Set patient up with PCP after discharge from the hospital    8. Situational anxiety  Reports trouble sleeping due to situational anxiety having to deal with housing-sold house in [place] and has not purchased new house in [place] yet  Also having situational anxiety  regarding medical conditions and possibility of recurrent lymphoma  Establish with PCP after discharge from hospital for further discussion of medications if needed to control anxiety symptoms and help with obtaining sleep at night            Procedures    Oncology/Hematology Referral - In Network       Medications & Refills for this Visit:  Current Medications[6]  Requested Prescriptions      No prescriptions requested or ordered in this encounter         Health Maintenance:  Health Maintenance   Topic Date Due    Annual Physical  Never done    Zoster Vaccines (1 of 2) Never done    Colorectal Cancer Screening  09/15/2018    COVID-19 Vaccine (8 - 2024-25 season) 04/29/2025    Influenza Vaccine  Completed    Annual Depression Screening  Completed    Fall Risk Screening (Annual)  Completed    Pneumococcal Vaccine: 50+ Years  Completed    Meningococcal B Vaccine  Aged Out       Chronic Care Management Referral: N/A      Transitional Care Management Certification:  During the visit, I accessed [facility] and/or [facility] Everywhere and personally reviewed the following for the recent hospitalization: provider notes, consults, summaries, labs and other test results and the pertinent findings are documented below.     Medication Reconciliation:  I am aware of an inpatient discharge within the last 30 days.  The discharge medication list has been reconciled with the patient's current medication list and reviewed by me.  See medication list for additions of new medication, and changes to current doses of medications and discontinued medications.        Discharge Disposition/Plan:     Future Appointments   Date Time Provider Department Center   5/1/2025 10:00 AM Mariaa Estrella MD NP [facility]   5/29/2025  2:00 PM Shahla Lundberg DO [facility] [place]   6/23/2025  1:00 PM Deepti Santiago MD [facility] [place]        1.  Transitional Care Clinic Visit: Next visit Discharged  2.  PCP Visit: 5/29/2025  3.  Chronic Care  Management: N/A     ANDREW Francois, 2025  [facility]  [phone]         [1]   Allergies  Allergen Reactions    Celebrex  [Celecoxib]    [2]   Past Medical History:   Chronic coronary artery disease    Dysfunction of eustachian tube    Gastroesophageal reflux disease    Hyperlipidemia    Hypertension    Hypogonadism in male    Description: Per Dr. Byrne     Hypothyroidism    Non-Hodgkin's lymphoma (HCC)    Osteoarthritis   [3]   Past Surgical History:  Procedure Laterality Date    Bowel resection      Cath angio  2025    Colectomy      Hernia surgery      Rotator cuff repair Right     Tonsillectomy     [4]   Family History  Problem Relation Age of Onset    Alcohol and Other Disorders Associated Other     High Blood Pressure Other     Diabetes Other     Heart Disease Other    [5]   Social History  Socioeconomic History    Marital status:    Tobacco Use    Smoking status: Former     Current packs/day: 0.00     Types: Cigarettes     Quit date: 1988     Years since quittin.3    Smokeless tobacco: Never   Vaping Use    Vaping status: Never Used   Substance and Sexual Activity    Alcohol use: Yes     Comment: couple beers a day but nothing since [facility]    Drug use: No     Social Drivers of Health     Food Insecurity: No Food Insecurity (2025)    NCSS - Food Insecurity     Worried About Running Out of Food in the Last Year: No     Ran Out of Food in the Last Year: No   Transportation Needs: No Transportation Needs (2025)    NCSS - Transportation     Lack of Transportation: No   Housing Stability: Not At Risk (2025)    NCSS - Housing/Utilities     Has Housing: Yes     Worried About Losing Housing: No     Unable to Get Utilities: No   [6]   No outpatient medications have been marked as taking for the 25 encounter (Office Visit) with Aziza Esquivel APRN.

## 2025-04-29 NOTE — ED PROVIDER NOTES
Patient Seen in: [facility] Emergency Department      History     Chief Complaint   Patient presents with    Difficulty Breathing     Stated Complaint: dayana from TCU    Subjective:   HPI    79-year-old male comes to the hospital feeling short of breath since yesterday.  He initially went to outpatient, found his pulse ox to be low, placed on 2 L of oxygen and sent to the emergency room for further evaluation.  He is a history of having non-Hodgkin's lymphoma in the past.  He has no other bleeding disease he states he denies any headaches or dizziness.  No chest pain.  Denies any abdominal pains.  Denies any nausea vomiting or diarrhea.  He did recently have x-ray and back strain.  He is denying any significant cough or phlegm production.  He is having no fever, chills or bodyaches.  History of Present Illness               Objective:     Past Medical History:    Chronic coronary artery disease    Dysfunction of eustachian tube    Gastroesophageal reflux disease    Hyperlipidemia    Hypertension    Hypogonadism in male    Description: Per Dr. Byrne     Hypothyroidism    Non-Hodgkin's lymphoma (HCC)    Osteoarthritis              Past Surgical History:   Procedure Laterality Date    Bowel resection      Colectomy      Hernia surgery      Rotator cuff repair Right     Tonsillectomy                  Social History     Socioeconomic History    Marital status:    Tobacco Use    Smoking status: Former     Current packs/day: 0.00     Types: Cigarettes     Quit date: 1988     Years since quittin.3    Smokeless tobacco: Never   Vaping Use    Vaping status: Never Used   Substance and Sexual Activity    Alcohol use: Yes     Alcohol/week: 0.0 standard drinks of alcohol     Comment: social    Drug use: No     Social Drivers of Health     Food Insecurity: No Food Insecurity (2025)    NCSS - Food Insecurity     Worried About Running Out of Food in the Last Year: No     Ran Out of Food in the Last Year:  No   Transportation Needs: No Transportation Needs (4/23/2025)    NCSS - Transportation     Lack of Transportation: No   Housing Stability: Not At Risk (4/23/2025)    NCSS - Housing/Utilities     Has Housing: Yes     Worried About Losing Housing: No     Unable to Get Utilities: No                                Physical Exam     ED Triage Vitals   BP 04/29/25 1130 (!) 176/80   Pulse 04/29/25 1124 85   Resp 04/29/25 1124 16   Temp 04/29/25 1130 97.9 °F (36.6 °C)   Temp src 04/29/25 1130 Temporal   SpO2 04/29/25 1124 98 %   O2 Device 04/29/25 1124 Nasal cannula       Current Vitals:   Vital Signs  BP: (!) 176/80  Pulse: 81  Resp: 18  Temp: 97.9 °F (36.6 °C)  Temp src: Temporal  MAP (mmHg): (!) 107    Oxygen Therapy  SpO2: 98 %  O2 Device: Nasal cannula  O2 Flow Rate (L/min): 4 L/min        Physical Exam  HEENT : NCAT, EOMI, PEERL,  neck supple, no JVD, trachea midline, No LAD  Heart: S1S2 normal. No murmurs, regular rate and rhythm upper chest wall masses noted  Lungs: There are some faint diminishment in crackles noted bilaterally  Abdomen: Soft nontender nondistended normal active bowel sounds without rebound, guarding or masses noted  Back nontender without CVA tenderness  Extremity no clubbing, cyanosis or edema noted.  Full range of motion noted without tenderness  Neuro: No focal deficits noted    All measures to prevent infection transmission during my interaction with the patient were taken.  The patient was already wearing droplet mask on my arrival to the room.  Personal protective equipment including a droplet mask as well as gloves were worn throughout the duration of my exam.  Hand washing was performed prior to and after the exam.  Stethoscope and equipment used during my examination was cleaned with a super Sani cloth germicidal wipe following the exam.  Physical Exam                ED Course     Labs Reviewed   COMP METABOLIC PANEL (14) - Abnormal; Notable for the following components:       Result  Value    Glucose 102 (*)     Sodium 123 (*)     Chloride 92 (*)     Creatinine 0.65 (*)     Calculated Osmolality 256 (*)     AST 42 (*)     ALT 60 (*)     Alkaline Phosphatase 177 (*)     Bilirubin, Total 1.5 (*)     All other components within normal limits   CBC WITH DIFFERENTIAL WITH PLATELET - Abnormal; Notable for the following components:    RBC 3.08 (*)     HGB 9.2 (*)     HCT 27.8 (*)     Lymphocyte Absolute 0.41 (*)     Monocyte Absolute 1.46 (*)     All other components within normal limits   PRO BETA NATRIURETIC PEPTIDE - Abnormal; Notable for the following components:    Pro-Beta Natriuretic Peptide 3,440 (*)     All other components within normal limits   PROTHROMBIN TIME (PT) - Abnormal; Notable for the following components:    PT 15.4 (*)     INR 1.21 (*)     All other components within normal limits   D-DIMER - Abnormal; Notable for the following components:    D-Dimer 2.28 (*)     All other components within normal limits   TROPONIN I HIGH SENSITIVITY - Normal   PTT, ACTIVATED - Normal   SARS-COV-2/FLU A AND B/RSV BY PCR (GENEXPERT) - Normal    Narrative:     This test is intended for the qualitative detection and differentiation of SARS-CoV-2, influenza A, influenza B, and respiratory syncytial virus (RSV) viral RNA in nasopharyngeal or nares swabs from individuals suspected of respiratory viral infection consistent with COVID-19 by their healthcare provider. Signs and symptoms of respiratory viral infection due to SARS-CoV-2, influenza, and RSV can be similar.    Test performed using the Xpert Xpress SARS-CoV-2/FLU/RSV (real time RT-PCR)  assay on the Gene[employer]pert instrument, [employer], [place] [address].   This test is being used under the Food and Drug Administration's Emergency Use Authorization.    The authorized Fact Sheet for Healthcare Providers for this assay is available upon request from the laboratory.   SCAN SLIDE   RAINBOW DRAW LAVENDER   RAINBOW DRAW LIGHT GREEN   RAINBOW DRAW BLUE      EKG    Rate, intervals and axes as noted on EKG Report.  Rate: 83  Rhythm: Sinus Rhythm  Reading: CO 2/1/1957, QRS of 82, patient has a normal sinus rhythm without ischemic change.           ED Course as of 04/29/25 1231  ------------------------------------------------------------  Time: 04/29 1230  Comment: Patient's COVID influenza and RSV are negative.  His He was 9.2 with a white count of 7.8 thousand.  He had a BNP elevated at 3440.  His electrolytes unremarkable.  This is millimeters of his LFTs.  The patient underwent a portable chest x-ray consistent with CHF.  The radiology report as well.  He does have an elevated D-dimer and LFTs I will get imaging of his chest and abdomen pelvis prior admission.  Musculoskeletal.     Results            XR CHEST AP PORTABLE  (CPT=71045)  Result Date: 4/29/2025  PROCEDURE:  XR CHEST AP PORTABLE  (CPT=71045)  TECHNIQUE:  AP chest radiograph was obtained.  COMPARISON:  [facility], XR, XR CHEST AP PORTABLE  (CPT=71045), 4/23/2025, 3:57 PM.  INDICATIONS:  dayana from TCU  PATIENT STATED HISTORY: (As transcribed by Technologist)  Patient stated he has been having shortness of breath.    FINDINGS:  Cardiac size is stable.  Mild interstitial opacities.  Probable trace bilateral pleural effusions with bibasilar opacities.  No pneumothorax.  Surgical clips overlying the right hemithorax.  Atheromatous calcifications of the aorta.            CONCLUSION:  1. Mild interstitial opacities which may represent mild edema. 2. Trace bilateral pleural effusions with bilateral basilar atelectasis/infiltrates.    LOCATION:  [id number]      Dictated by (CST): Eloisa Almonte MD on 4/29/2025 at 11:52 AM     Finalized by (CST): Eloisa Almonte MD on 4/29/2025 at 11:53 AM       CT CHEST (W+WO) (TNR=80069)  Result Date: 4/25/2025  PROCEDURE:  CT CHEST (W+WO) (CPT=71270)  COMPARISON:  [facility], CT, CT SPINE CERVICAL (CPT=72125), 4/23/2025, 5:08 PM.  [facility], CT, CTA BRAIN (ZOW=56685),  4/23/2025, 5:11 PM.  INDICATIONS:  hx of lymphoma s/p radiation  TECHNIQUE:  Unenhanced followed by IV contrast-enhanced multislice CT scanning is performed through the chest using nonionic contrast.  Dose reduction techniques were used. Dose information is transmitted to the ACR (American College of Radiology) NRDR (National Radiology Data Registry) which includes the Dose Index Registry.  PATIENT STATED HISTORY:(As transcribed by Technologist)  history of lymphoma.  status post radiation.    CONTRAST USED:  70cc of Isovue 370  FINDINGS:  LUNGS:  Bilateral pleural effusions with bibasilar compressive atelectasis.  There is peribronchial thickening in the perihilar regions bilaterally.  There is apical pleural parenchymal scarring in the right lung with more focal pleural based opacity/consolidation measuring 2.0 x 1.9 x 1.5 cm extending along the anterior aspect of the right upper lobe likely representing post radiation change.  Differential includes focal pneumonia or rounded atelectasis.  Follow-up to exclude underlying pulmonary mass recommended. VASCULATURE:  No visible pulmonary arterial thrombus or attenuation.  MARIO:  No enlarged adenopathy.  MEDIASTINUM:  Normal caliber right paratracheal, AP window lymph nodes.  Subcarinal lymph node measures 2.2 x 1.5 cm. .  CARDIAC:  No significant pericardial effusion.  There is coronary artery calcification.  PLEURA:  Bilateral pleural effusions right greater than left.  AORTA:  No aneurysm.  No aortic dissection CHEST WALL:  There is an ovoid peripheral enhancing collection arising from the anterior superior chest wall anterior to the right 1st through 3rd ribs measuring 7.3 x 2.1 x 5.7 cm.  There are some surgical clips anterior and superior to this chest wall mass/collection.  This appears to extend to the head of the 1st rib and 1st costochondral junction.  There is additional subcutaneous soft tissue stranding in the right supraclavicular region anterior to  surgical clips.  LIMITED ABDOMEN:  Normal caliber adrenal glands.  BONES:  Extensive hypertrophic endplate changes thoracic spine.            CONCLUSION:  There are bilateral pleural effusions right greater than left with atelectasis/consolidation in the lung bases.  Right apical pleural parenchymal scarring with more focal pleural based consolidative process measuring 2.0 x 1.9 x 1.5 cm in the anterior upper lobe which may represent post radiation change.  Short-term follow-up recommended to ensure stability.  Mildly enlarged subcarinal lymph node.  There is a nonenhancing ovoid masslike area/collection arising from the right upper chest wall anterior to the right 1st through 3rd ribs measuring 7.3 x 5.7 x 2.1 cm.  This is adjacent to surgical clips.  This may represent post surgical seroma/liquefying hematoma.  There is some subcutaneous edema in the right supraclavicular region with additional soft tissue stranding surrounding the right  sternoclavicular joint.  Clinical correlation recent surgical history/procedure history.  with Infection cannot entirely be excluded in the appropriate clinical setting.  LOCATION:  [facility]   Dictated by (CST): Rhonda Carranza MD on 4/25/2025 at 6:24 PM     Finalized by (CST): Rhonda Carranza MD on 4/25/2025 at 6:35 PM       CTA BRAIN (CPT=70496)  Addendum Date: 4/25/2025  ADDENDUM:  Three-dimensional image processing was completed using a separate workstation under concurrent supervision.  Dictated by (CST): Anson Paz MD on 4/25/2025 at 8:25 AM     Finalized by (CST): Anson Paz MD on 4/25/2025 at 8:25 AM             Result Date: 4/25/2025  PROCEDURE:  CTA BRAIN (CPT=70496)  COMPARISON:  None.  INDICATIONS:  severe left sided headache  TECHNIQUE:  Unenhanced followed by contrast enhanced multislice CT angiography of the brain vasculature using non-ionic contrast. Multiplanar 3D reformatted imaging as well as multiplanar MIP images were obtained. Dose reduction techniques were  used. Dose information is transmitted to the ACR (American College of Radiology) NRDR (National Radiology Data Registry) which includes the Dose Index Registry.  PATIENT STATED HISTORY:(As transcribed by Technologist)  Patient has left sided head/neck pain for 4-5 day. Patients pain is now across bilateral shoulders and worsened the last 2 days. Denies fall/trauma area. Patient has increased fatigue and weakness   CONTRAST USED:  60cc of Isovue 370  FINDINGS:   Noncontrast head CT:  No significant midline shift or mass effect is seen.  No evidence of acute intracranial hemorrhage.  There are probable mild chronic microvascular ischemic changes present with some hypoattenuation present within the periventricular white matter.  CTA head:  The bilateral high cervical, petrous, cavernous and supraclinoid ICA segments appear patent.  Overall mild calcific atherosclerosis is noted of the cavernous segments bilaterally.  Distal branches of the bilateral anterior and middle cerebral arteries are unremarkable.  The basilar artery is normal in caliber.  Distal branches of the bilateral vertebral arteries appear patent.  Distal branches of the bilateral posterior cerebral arteries are unremarkable.  There is no evidence of large vessel intracranial arterial occlusion.            CONCLUSION:   No significant midline shift or mass effect.  No acute intracranial hemorrhage.  Mild chronic microvascular ischemic changes are favored.  If there is persistent clinical concern then consider MRI.  There is no evidence of large vessel intracranial arterial occlusion.    LOCATION:  [facility]   Dictated by (CST): Anson Paz MD on 4/23/2025 at 5:38 PM     Finalized by (CST): Anson Paz MD on 4/23/2025 at 5:43 PM       US GROIN RIGHT LIMITED (CPT=76882)  Result Date: 4/23/2025  PROCEDURE:  US GROIN RIGHT LIMITED (CPT=76882)  COMPARISON:  None.  INDICATIONS:  neck pain 2 weeks  and lump and groin area 1 week  TECHNIQUE:  Sonography was performed  of the clinically requested area of interest.  PATIENT STATED HISTORY: (As transcribed by Technologist)  Patient stated lump to right groin for one week.    FINDINGS:  There is a hernia in the area of concern within the right groin region.  This measures up to approximately 3.3 x 1.8 by 2.0 cm and appears to contain fat.  There is no evidence of obstruction in this region.  This could be best assessed with CT if clinically indicated.            CONCLUSION:  See above.   LOCATION:  [facility]   Dictated by (CST): Anson Paz MD on 4/23/2025 at 7:28 PM     Finalized by (CST): Anson Paz MD on 4/23/2025 at 7:29 PM       CT SPINE CERVICAL (CPT=72125)  Result Date: 4/23/2025  PROCEDURE:  CT SPINE CERVICAL (CPT=72125)  COMPARISON:  None.  INDICATIONS:  severe neck pain/weakness  TECHNIQUE:  Noncontrast CT scanning of the cervical spine is performed from the skull base through C7.  Multiplanar reconstructions are generated.  Dose reduction techniques were used. Dose information is transmitted to the ACR (American College of Radiology) NRDR (National Radiology Data Registry) which includes the Dose Index Registry.  PATIENT STATED HISTORY: (As transcribed by Technologist)  Patient has left sided head/neck pain for 4-5 day. Patients pain is now across bilateral shoulders and worsened the last 2 days. Denies fall/trauma area. Patient has increased fatigue and weakness    FINDINGS:  There is normal cervical lordosis.  No significant spondylolisthesis is present.  Vertebral bodies appear maintained in height.  Mild intervertebral disc space narrowing is present from C2 through C4 and from C5 through T1.  There is no evidence of acute fracture of the cervical spine.  There are overall mild multilevel degenerative changes present with posterior disc osteophyte complex is likely present at C5-6 and C6-7.  Overall mild bilateral neural foraminal stenosis is favored at these levels as well as on the right at C7-T1 due to facet and  uncovertebral hypertrophy.  Limited views of the lung apices demonstrate airspace opacity of the right upper lobe which is not well seen.  This could represent an area of scarring but is incompletely visualized.            CONCLUSION:  No evidence of acute fracture of the cervical spine.  Overall mild multilevel degenerative changes are present.  If there is persistent concern then consider follow-up imaging including MRI.  Limited views of the lung apices demonstrate airspace opacity of the right lung apex which is not well assessed on this exam.  This could represent an area of scarring but is incompletely visualized.    LOCATION:  [facility]   Dictated by (CST): Anson Paz MD on 4/23/2025 at 5:32 PM     Finalized by (CST): Anson Paz MD on 4/23/2025 at 5:36 PM       XR CHEST AP PORTABLE  (CPT=71045)  Result Date: 4/23/2025  PROCEDURE:  XR CHEST AP PORTABLE  (CPT=71045)  TECHNIQUE:  AP chest radiograph was obtained.  COMPARISON:  None.  INDICATIONS:  neck pain 2 weeks  and lump in groin area 1 week  PATIENT STATED HISTORY: (As transcribed by Technologist)  Pt c/o left sided cervical pain for past 4-5 days. Pt denies any injury.    FINDINGS:  Surgical clips are seen in the right axilla/right subclavian region on the frontal view.  Orthopedic hardware seen projecting over the right humeral head.  Heart size is within the limits of normal.  Atheromatous calcified plaque is seen within the aorta.  There is mild scarring or atelectasis at the left lung base with minimal blunting in the left costophrenic angle suggesting a tiny left pleural effusion.   The right lung is clear.  Degenerative changes are seen in the spine and both shoulders.            CONCLUSION:  1. Tiny left pleural effusion and left basilar atelectasis or scarring. 2. Postsurgical changes are seen in the right axilla/right upper chest.    LOCATION:  [facility]      Dictated by (CST): Zach Mayfield MD on 4/23/2025 at 4:06 PM     Finalized by (CST):  Zach Mayfield MD on 4/23/2025 at 4:08 PM       Medications   furosemide (Lasix) 10 mg/mL injection 40 mg (has no administration in time range)                          MDM      Differential diagnosis included pneumonia, pneumothorax, CHF, PE not limited such.  This time the patient was diagnosed with CHF.  Per his admission while the CT of his chest without PE as well as abdomen/pelvis for his elevated liver functions.  Patient given IV Lasix will be admitted to hospitalist team.      This note was prepared using Dragon Medical voice recognition dictation software.  As a result errors may occur.  When identified to these areas have been corrected.  While every attempt is made to correct errors during dictation discrepancies may still exist.  Please contact if there are any errors.        Medical Decision Making      Disposition and Plan     Clinical Impression:  1. Acute on chronic congestive heart failure, unspecified heart failure type (HCC)    2. Hypoxia         Disposition:  There is no disposition on file for this visit.  There is no disposition time on file for this visit.    Follow-up:  No follow-up provider specified.        Medications Prescribed:  Current Discharge Medication List          Supplementary Documentation:

## 2025-04-29 NOTE — ED QUICK NOTES
Orders for admission, patient is aware of plan and ready to go upstairs. Any questions, please call ED RN Katelin at extension [phone].     Patient Covid vaccination status: Unvaccinated     COVID Test Ordered in ED: SARS-CoV-2/Flu A and B/RSV by PCR (Gene[facility]pert)    COVID Suspicion at Admission: N/A    Running Infusions: Medication Infusions[1] None    Mental Status/LOC at time of transport: AOx4    Other pertinent information:   CIWA score: N/A   NIH score:  N/A             [1]

## 2025-04-29 NOTE — ED QUICK NOTES
Rounding Completed    Plan of Care reviewed. Waiting for CTA.    Bed is locked and in lowest position. Call light within reach.

## 2025-04-29 NOTE — PLAN OF CARE
Patient admitted from ED into 86 just after 1700. Oriented to room, call light and safety. O2 sat initially low. 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 Pulse ox moved to ear lobe and satting in mid 90s to 100. NSR w/ 1st degree block on tele monitor. Plan of care updated. Bed locked, in lowest position. Call light and personal items in reach. All needs met.    Problem: Patient/Family Goals  Goal: Patient/Family Long Term Goal  Description: Patient's Long Term Goal: stay out of the hospital  Interventions:  - take meds as prescribed  - attend follow up appointments  - See additional Care Plan goals for specific interventions  Outcome: Progressing  Goal: Patient/Family Short Term Goal  Description: Patient's Short Term Goal: go home  Interventions:   - meds, labs, tele monitoring  - ECHO  - consults to see  - See additional Care Plan goals for specific interventions  Outcome: Progressing     Problem: CARDIOVASCULAR - ADULT  Goal: Maintains optimal cardiac output and hemodynamic stability  Description: INTERVENTIONS:- Monitor vital signs, rhythm, and trends- Monitor for bleeding, hypotension and signs of decreased cardiac output- Evaluate effectiveness of vasoactive medications to optimize hemodynamic stability- Monitor arterial and/or venous puncture sites for bleeding and/or hematoma- Assess quality of pulses, skin color and temperature- Assess for signs of decreased coronary artery perfusion - ex. Angina- Evaluate fluid balance, assess for edema, trend weights  Outcome: Progressing  Goal: Absence of cardiac arrhythmias or at baseline  Description: INTERVENTIONS:- Continuous cardiac monitoring, monitor vital signs, obtain 12 lead EKG if indicated- Evaluate effectiveness of antiarrhythmic and heart rate control medications as ordered- Initiate emergency measures for life threatening arrhythmias- Monitor electrolytes and administer replacement therapy as ordered  Outcome: Progressing

## 2025-04-29 NOTE — PLAN OF CARE
Admission navigator completed by admission RN.     Lives with wife.   Has been using a walker inside this past week d/t weakness but normally uses no assisted devices.       FYI: He's not taking the flexeril made him sick and hallucinate at home.

## 2025-04-30 NOTE — DIETARY NOTE
[facility]   part of [facility]    NUTRITION ASSESSMENT    Pt does not meet malnutrition criteria at this time.      NUTRITION INTERVENTION:      Meal and Snacks - Monitor and encourage adequate PO intake.   Medical Food Supplements - Ensure Plus High Protein Daily. Chocolate. Rationale/use for oral supplements discussed.  Nutrition Education - Briefly reviewed Na restriction, pt reports he watches his overall Na intake. Per chart review this is a new dx of CHF. Will need reinforcement- No family at bedside.     PATIENT STATUS: 04/30/25 79 year old male admitted 4/29 with SOB. Chart reviewed for heart failure diet ed and wt loss. Reports good PO, though unhappy with quality of food. Pt has lost some wt, but feels this is more related to other stressors rather than Low PO intake. Agreeable to ONS. No problems chewing or swallowing. Had Miralax this AM. RD to follow for [facility] ed.     PMHx  HTN, HL, hypothyroidism, Non-Hodgkin's Lymphoma, and PVD.     ANTHROPOMETRICS:  Ht:  5'8\"  Wt: 71.1 kg (156 lb 12 oz).   BMI: Body mass index is 23.83 kg/m².  IBW: 70 kg      WEIGHT HISTORY:   Weight loss: no    Wt Readings from Last 10 Encounters:   04/30/25 71.1 kg (156 lb 12 oz)   04/23/25 72.1 kg (159 lb)   06/13/24 78 kg (172 lb)   12/27/22 75.8 kg (167 lb)   05/21/22 77.6 kg (171 lb)   08/08/19 84 kg (185 lb 3.2 oz)   08/06/18 84.8 kg (187 lb)   08/02/18 84.4 kg (186 lb)   08/01/18 83.9 kg (185 lb)   01/02/18 85.3 kg (188 lb)        NUTRITION:  Diet:       Procedures    Cardiac diet Sodium Restriction: 2 GM NA; Fluid Restriction: 2000 ml; Is Patient on Accuchecks? No      Food Allergies: No  Cultural/Ethnic/Episcopalian Preferences Addressed: Yes    Percent Meals Eaten (last 3 days)       Date/Time Percent Meals Eaten (%)    04/29/25 2020 100 %    04/30/25 1031 100 %            GI system review: WNL Last BM Date: 04/29/25  Skin and wounds: intact    NUTRITION RELATED PHYSICAL FINDINGS:     1. Body Fat/Muscle Mass:  unable to assess     2. Fluid Accumulation: none per RN documentation    NUTRITION PRESCRIPTION:  71.1 kg Actual Body Weight  Calories: 7386-6909 calories/day (25-30 kcal/kg)  Protein:  grams protein/day (1.0-1.5 grams protein per kg)  Fluid: ~1 ml/kcal or per MD discretion    NUTRITION DIAGNOSIS/PROBLEM:  Inadequate oral intake related to insufficient appetite resulting in inadequate nutrition intake as evidenced by documented/reported insufficient oral intake      MONITOR AND EVALUATE/NUTRITION GOALS:  PO intake of 75% of meals TID - New  PO intake of 75% of oral nutrition supplement/s - New  Weight stable within 1 to 2 lbs during admission - New      MEDICATIONS:  Reviewed    LABS:  Reviewed    Pt is at Moderate nutrition risk    Precious Johnson RD, LDN  Clinical Nutrition  [id number]

## 2025-04-30 NOTE — TELEPHONE ENCOUNTER
James called to cancel his appt for 5/1 he is currently in the hospital at [place]. I do have him reschedule for may 15th. T/y sharon

## 2025-04-30 NOTE — CONSULTS
[facility]  Report of Consultation    James Roberts Patient Status:  Observation    10/17/1945 MRN [id number]   Formerly [facility] 8NE-A Attending Riley Reaves, *   Hosp Day # 0 PCP PHYSICIAN NONSTAFF     Reason for Consultation:   SOB / CHF    History of Present Illness:   James Roberts is a(n) 79 year old male with a past history of HTN, HL, hypothyroidism, Non-Hodgkin's Lymphoma, and PVD.   Last treatment for NHL  per pt.  Pt has undergone PTA / stent of the BLE due to PVD in the past.  He was under consideration for coronary PCI previously per records, but reports that he ultimately never underwent any coronary intervention.  He denies any prior coronary PCI.  He used to receive care in this area, however moved to [place] where he has been receiving most of his care.  He has returned to sell his home.  Noted back and neck pains with moving efforts and was hospitalized for treatment earlier this month by his Hx.  He was discharged, but in the last few days has noted SOB Sx.  He has noted dyspnea with low levels of activity, including walking from one room to another.  No associated chest pain, pressure, heaviness, or tightness.  No  palpitaitoons.  No presyncope or syncope.  This represents a significant change from his baseline state.  He has been limited by claudication sSx for quite some time and indicates that LE angiography has been under consideration in [place] recently, but not yet pursued.  Though activity has been limited by claudication in the past, dyspnea is now the limiting factor.  No significant edema noted.          Past Medical History:   Past Medical History:   Chronic coronary artery disease    Dysfunction of eustachian tube    Gastroesophageal reflux disease    Hyperlipidemia    Hypertension    Hypogonadism in male    Description: Per Dr. Byrne     Hypothyroidism    Non-Hodgkin's lymphoma (HCC)    Osteoarthritis    PVD s/p LE percutaneous intervention       Social  History:   Smoking:  Quit in 1986.  25 years X 2 ppd  Alcohol:  2-3 beer per day    Family History:   No family history of premature arthrosclerotic heart disease     Medications:   Scheduled:   Scheduled Medications[1]    Continuous Infusion:   Medication Infusions[2]    PRN Medications:   PRN Medications[3]    Outpatient Medications:   Medications Ordered Prior to Encounter[4]    Allergies:   Allergies[5]    Review of Systems:   No fevers, chills, change in weight or bowel habits.  Ten point review of systems is otherwise negative or unremarkable.    Physical Exam:   Vitals:    04/30/25 0740   BP: (!) 162/73   Pulse: 83   Resp: 16   Temp: 97.9 °F (36.6 °C)     Wt Readings from Last 3 Encounters:   04/30/25 156 lb 12 oz (71.1 kg)   04/23/25 159 lb (72.1 kg)   06/13/24 172 lb (78 kg)           General: Well developed, well nourished male.  Pt is in no acute distress.  HEENT:   Normocephalic.  Atraumatic.  Eyes with no scleral icterus.  Neck: Supple.  No JVD.  Carotids 2+ and equal in symmetric fashion.  No bruits are noted.  Cardiac: Regular rate and rhythm.   There is a normal S1 and S2.  No S3 or S4.  No murmurs, rubs, or gallops.  PMI is non-displaced with a normal apical impulse.  Lungs: Clear to ascultation bilaterally.  No focal rales, rhonchi, or wheezes.  Good air movement is noted throughout all lung fields.  Abdomen: Soft.  Non-distended.  Non-tender.  Bowel sounds are present and normoactive.  No guarding or rebound.   Extremities: Extremities do not demonstrate any evidence of peripheral edema.   No cyanosis or clubbing of the digits is appreciated.  Femoral, Dorsalis Pedis, and Posterior Tibialis  pulses are 2+ and equal in a symmetric fashion.  Neurologic: Alert and oriented, normal affect.  No gross deficit appreciated.  Integument:  No visible rashes are appreciated.  Scars from past sking  lesion removal noted.      Laboratories and Data:   Labs:    Recent Labs   Lab 04/23/25  1551 04/24/25  0607  04/25/25  1451 04/29/25  1129 04/30/25  0502   * 141*  --  102* 89   BUN 11 10  --  11 11   CREATSERUM 0.75 0.79 0.73 0.65* 0.69*   CA 9.4 9.6  --  9.8 9.9   ALB 4.0  --   --  4.3 4.2   * 133*  --  123* 128*   K 4.4 4.8  --  4.0 4.0   CL 97* 97*  --  92* 92*   CO2 29.0 28.0  --  28.0 26.0   ALKPHO 53  --   --  177* 160*   AST 16  --   --  42* 39*   ALT 18  --   --  60* 51*   BILT 0.8  --   --  1.5* 1.2*   TP 6.3  --   --  6.6 6.6       Recent Labs   Lab 04/24/25  0607 04/29/25  1129 04/30/25  0502   RBC 3.13* 3.08* 3.38*   HGB 9.5* 9.2* 10.1*   HCT 29.4* 27.8* 29.6*   MCV 93.9 90.3 87.6   MCH 30.4 29.9 29.9   MCHC 32.3 33.1 34.1   RDW 18.6 18.6 18.5   NEPRELIM 6.82 5.71 3.42   WBC 9.2 7.8 5.6   .0 181.0 202.0       Recent Labs   Lab 04/29/25  1129   PTP 15.4*   INR 1.21*       No results for input(s): \"TROP\", \"CK\" in the last 168 hours.    Diagnostics:   Tele:  SR  EKG: SR.  1 AVB  Echo: Pending      Assessment:   HTN   HL   SOB  -HFpEF  -Component of COPD  -?Ischemic heart disease  4.    Hypothyroidism  5.    PVD   -S/P LE percutaneous intervention in the past   -Claudication is noted  6.   Smoking Hx  7.   ?Component of COPD  8.   Hyponatremia  9.   Anemia  10.  Elevated LFTs  11.  Elevated D-Dimer   -CT negative for PE  12.  Pleural effusion on CT        Plan:   IV Lasix   Echo pending   ASA   BB   Statin   Check lipids   F/U CMP to F/U sodium, LFTs, and renal parameters with diuresis   Ischemic evaluation pending Echo data  -Non-invasive vs proceeding directly to cath given elevated risk profile / symptoms.  9.    Titrate Norvasc as hemodynamics dictate  10.  TSH / lipids    Nathaniel Jeffries MD  4/30/2025  8:45 AM    Echo reviewed with pt.  Ischemic assessment advised.  Discussed non-invasive testing vs proceeding directly to cath.  Pts Sx are concerning and noted in setting of multiple CV risk factors.  Recommend cath to clearly define coronary anatomy.  The nature of cardiac catheterization  has been reviewed in detail.  Risks including, but not limited to the major risks of CVA, MI, and death have been reviewed.  The potential for percutaneous coronary intervention has been reviewed.  Risks of emergent CABG and restenosis have been reviewed.  The patient acknowledges risks of the procedure and agrees to proceed.  He reports that attempt was made at R radial cath in the past and was unsuccessful - potentially due to surgical scarring  anatomy changes from soft tissue tumor removal from R chest area.Will be seen by interventional cardiology and consider femoral approach.              [1]    amLODIPine  2.5 mg Oral Daily    [START ON 5/1/2025] aspirin  81 mg Oral QOD    atenolol  25 mg Oral Nightly    atorvastatin  10 mg Oral Nightly    levothyroxine  125 mcg Oral Before breakfast    pantoprazole  40 mg Oral BID    tamsulosin  0.4 mg Oral Daily    enoxaparin  40 mg Subcutaneous Daily    furosemide  40 mg Intravenous Daily   [2] [3]   acetaminophen    melatonin    guaiFENesin ER    benzonatate    glycerin-hypromellose-    sodium chloride    ondansetron    metoclopramide    polyethylene glycol (PEG 3350)    sennosides    bisacodyl    fleet enema  [4]   Current Facility-Administered Medications on File Prior to Encounter   Medication Dose Route Frequency Provider Last Rate Last Admin    [COMPLETED] iopamidol 76% (ISOVUE-370) injection for power injector  70 mL Intravenous ONCE PRN Nnia Seth MD   70 mL at 04/25/25 1803    [COMPLETED] morphINE PF 4 MG/ML injection 4 mg  4 mg Intravenous Once Leatha Green MD   4 mg at 04/23/25 1546    [COMPLETED] ondansetron (Zofran) 4 MG/2ML injection 4 mg  4 mg Intravenous Once Leatha Green MD   4 mg at 04/23/25 1546    [COMPLETED] sodium chloride 0.9 % IV bolus 1,000 mL  1,000 mL Intravenous Once Leatha Green MD   Stopped at 04/23/25 1828    [COMPLETED] iopamidol 76% (ISOVUE-370) injection for power injector  60 mL Intravenous ONCE PRN  Leatha Green MD   60 mL at 04/23/25 1710    [COMPLETED] morphINE PF 4 MG/ML injection 4 mg  4 mg Intravenous Once Leatha Green MD   4 mg at 04/23/25 2026     Current Outpatient Medications on File Prior to Encounter   Medication Sig Dispense Refill    amLODIPine 5 MG Oral Tab Take 0.5 tablets (2.5 mg total) by mouth daily.      atenolol 25 MG Oral Tab Take 1 tablet (25 mg total) by mouth daily. (Patient taking differently: Take 1 tablet (25 mg total) by mouth at bedtime.)      atorvastatin 10 MG Oral Tab Take 1 tablet (10 mg total) by mouth nightly.      tamsulosin 0.4 MG Oral Cap Take 1 capsule (0.4 mg total) by mouth daily.      levothyroxine 125 MCG Oral Tab Take 1 tablet (125 mcg total) by mouth before breakfast.  0    triamcinolone acetonide 0.025 % External Cream Apply topically in the morning and before bedtime.      aspirin 81 MG Oral Tab Take 1 tablet (81 mg total) by mouth every other day.      Bioflavonoid Products (DESIRAE C OR) Take 500 mg by mouth in the morning.      Multiple Vitamins-Minerals (MULTIVITAMIN OR) Take by mouth in the morning.      pantoprazole 40 MG Oral Tab EC Take 1 tablet (40 mg total) by mouth in the morning and 1 tablet (40 mg total) before bedtime.     [5]   Allergies  Allergen Reactions    Celebrex  [Celecoxib]

## 2025-04-30 NOTE — DISCHARGE SUMMARY
[facility]  DISCHARGE SUMMARY     James Roberts Patient Status:  Inpatient    10/17/1945 MRN [id number]   Location [facility] 3SW-A Attending No att. 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  Patient states that moving his head in any direction hurts.  Patient has been feeling some nausea but no vomiting.  Patient reports some chills a few days ago that is now resolved.  Patient denies any fevers no vision changes.  No numbness or tingling in the upper extremities or any weakness.  Patient denies any chest pain, shortness of breath, palpitations.     Brief Synopsis: Patient was treated with supportive care, his fiancee was helping with hx.He doesn't f/u any pcp or oncology, advised to see oncology and pulmonary, referred to Dr Huizar and Dr Chavez.He was explained the chronic changes seen on ct chest, need for new PET scan as outpatient.He was drowsy with ms relaxants and narcotics and he was advised to use them with caution.    Lace+ Score: 62  59-90 High Risk  29-58 Medium Risk  0-28   Low Risk  Patient was referred to the ward  [facility].       TCM Follow-Up Recommendation:  LACE > 58: High Risk of readmission after discharge from the hospital.      Procedures during hospitalization:       Incidental or significant findings and recommendations (brief descriptions):      Lab/Test results pending at Discharge:       Consultants:  Pulmonary  oncology    Discharge Medication List:     Discharge Medications        CONTINUE taking these medications        Instructions Prescription details   aspirin 81 MG Tabs      Take 1 tablet (81 mg total) by mouth every other day.   Refills: 0     DESIRAE C OR      Take 500 mg by mouth in the morning.   Refills: 0     levothyroxine 125 MCG Tabs  Commonly known as: Synthroid      Take 1 tablet (125 mcg total) by mouth before breakfast.   Refills: 0     MULTIVITAMIN OR      Take by mouth in the morning.   Refills: 0     pantoprazole 40 MG Tbec  Commonly known as: Protonix      Take 1 tablet (40 mg total) by mouth in the morning and 1 tablet (40 mg total) before bedtime.   Refills: 0     tamsulosin 0.4 MG Caps  Commonly known as: Flomax      Take 1 capsule (0.4 mg total) by mouth daily.   Refills: 0     triamcinolone 0.025 % Crea  Commonly known as: Kenalog      Apply topically in the morning and before bedtime.   Refills: 0            STOP taking these medications      VITAMIN B 12 OR                 [place] reviewed[place]     Follow-up appointment:   [facility]  [address]  [facility] [address] [phone]  Schedule an appointment as soon as possible for a visit in 4 week(s)  hospital follow up, cervical radicular pain    [facility]  [address]  [facility] [address] [phone]  Schedule an appointment as soon as possible for a visit in 1 week(s)      Jose Chavez MD  [address]   [facility] [address] [phone]    Schedule an appointment as soon as possible for a  visit      Appointments for Next 30 Days 2025 - 2025        Date Arrival Time Visit Type Length Department Provider     2025 10:00 AM  CONSULT-HEM/ONC  [7027] 60 min [facility] Hematology Oncology [place] Mariaa Estrella MD    Patient Instructions:         Location Instructions:     **IF YOU NEED LABWORK OR AN INFUSION ALONG WITH YOUR APPOINTMENT, YOU MUST CALL TO SCHEDULE.**  Your appointment is on the [facility] campus in the Cancer Center. The address is [address] [place] [address]. Please register at the Cancer Center  on the second floor.  Masks are optional for all patients and visitors, unless otherwise indicated.               2025  2:00 PM  EXAM - NEW PATIENT [0985] 60 min [facility] Medical [facility] Shahla Lundberg DO    Patient Instructions:         Location Instructions:     Masks are optional for all patients and visitors, unless otherwise indicated. Note: A $50 fee will be charged for missed appointments or same-day cancellations. Please provide 24 hours' notice if you need to cancel or reschedule your appointment.                      Vital signs:       Physical Exam:    General: No acute distress   Lungs: clear to auscultation  Cardiovascular: S1, S2  Abdomen: Soft      -----------------------------------------------------------------------------------------------  PATIENT DISCHARGE INSTRUCTIONS: See electronic chart    Nina Seth MD    Total time spent on discharge plannin minutes     The  [employer] Cures Act makes medical notes like these available to patients in the interest of transparency. Please be advised this is a medical document. Medical documents are intended to carry relevant information, facts as evident, and the clinical opinion of the practitioner. The medical note is intended as peer to peer communication and may appear blunt or direct. It is written in medical language and may  contain abbreviations or verbiage that are unfamiliar.

## 2025-04-30 NOTE — PLAN OF CARE
Patient alert and oriented x 4. Up with standby assist. Declines bed/chair alarm. On RA. NSR on tele. Continent of bowel and bladder. No complaints of pain, shortness of breath, or chest pain/discomfort. POC discussed with patient. Fall precautions in place. Call light within reach.     Problem: Patient/Family Goals  Goal: Patient/Family Long Term Goal  Description: Patient's Long Term Goal: stay out of the hospital  Interventions:  - take meds as prescribed  - attend follow up appointments  - See additional Care Plan goals for specific interventions  Outcome: Progressing  Goal: Patient/Family Short Term Goal  Description: Patient's Short Term Goal: go home  Interventions:   - meds, labs, tele monitoring  - ECHO  - consults to see  - See additional Care Plan goals for specific interventions  Outcome: Progressing     Problem: CARDIOVASCULAR - ADULT  Goal: Maintains optimal cardiac output and hemodynamic stability  Description: INTERVENTIONS:- Monitor vital signs, rhythm, and trends- Monitor for bleeding, hypotension and signs of decreased cardiac output- Evaluate effectiveness of vasoactive medications to optimize hemodynamic stability- Monitor arterial and/or venous puncture sites for bleeding and/or hematoma- Assess quality of pulses, skin color and temperature- Assess for signs of decreased coronary artery perfusion - ex. Angina- Evaluate fluid balance, assess for edema, trend weights  Outcome: Progressing  Goal: Absence of cardiac arrhythmias or at baseline  Description: INTERVENTIONS:- Continuous cardiac monitoring, monitor vital signs, obtain 12 lead EKG if indicated- Evaluate effectiveness of antiarrhythmic and heart rate control medications as ordered- Initiate emergency measures for life threatening arrhythmias- Monitor electrolytes and administer replacement therapy as ordered  Outcome: Progressing

## 2025-04-30 NOTE — DISCHARGE INSTRUCTIONS
Going Home Instructions  In this section you will find the tools which will guide you through the first few days after you leave the hospital. Continued use of these tools will help you develop the skills necessary to keep your heart failure under control.     Home Care Instructions Following Heart Failure - the most important things to do every day include:   Weigh yourself and review the “Self-Check Plan” sheet every morning.   Call your cardiologist office if you are in the “Pay Attention-Use Caution” (yellow zone) or “Medical Alert-Warning!” (red zone) as outlined in the Self-Check Plan sheet.  Take your medicines as prescribed.  Limit your sodium (salt) intake.  Know when to call your cardiologist, primary doctor, or nurse.  Know when to seek emergency care.      Things for You to Remember:   1. See your doctor or healthcare provider as written on your discharge instructions.  It is important that you attend this appointment to make sure your symptoms are under control.     2. Your recommended sodium intake is 9364-6272 mg daily.    3.  Weigh yourself every day.    4. Some exercise and activity is important to help keep your heart functioning and strong. Unless instructed not to exercise, you may walk at a slow to moderate pace for 10-15 minutes 2-3 days per week to start. Pace your activity to prevent shortness of breath or fatigue. Stop exercising if you develop chest pain, lightheadedness, or significant shortness of breath.       Call Your Cardiologist If:   You gain 2-3 pounds in one day or 5 pounds in one week.  You have more difficulty breathing.  You are getting more tired with normal activity.  You are more short of breath lying down, or awaken at night short of breath.  You have swelling of your feet or legs.  You urinate less often during the day and more often at night.  You have cramps in your legs.  You have blurred vision or see yellowish-green halos around objects of lights.    Go to the  Emergency Room If:   You have pain or tightness in your chest  You are extremely short of breath  You are coughing up pink-frothy mucus  You are traveling and develop symptoms of worsening heart failure      ** Please follow up with your cardiologist or Advanced Practice Provider as written on your discharge instructions. If you are not provided with an appointment, let your nurse know so you can get an appointment**

## 2025-04-30 NOTE — PLAN OF CARE
Patient alert and oriented x 4. Up  SBA. On RA. With O2 sats > 90%. NSR w/ heart block on tele. Continent of bowel and bladder. Reports of pain on top of head, prn pain medication given x3. No complaints of  shortness of breath or chest pain/discomfort. POC : IV Lasix, DW, I&O, 2L FR. Fall precautions in place. Call light within reach.    Problem: Patient/Family Goals  Goal: Patient/Family Long Term Goal  Description: Patient's Long Term Goal: stay out of the hospital  Interventions:  - take meds as prescribed  - attend follow up appointments  - See additional Care Plan goals for specific interventions  Outcome: Progressing  Goal: Patient/Family Short Term Goal  Description: Patient's Short Term Goal: go home  Interventions:   - meds, labs, tele monitoring  - ECHO  - consults to see  - See additional Care Plan goals for specific interventions  Outcome: Progressing     Problem: CARDIOVASCULAR - ADULT  Goal: Maintains optimal cardiac output and hemodynamic stability  Description: INTERVENTIONS:- Monitor vital signs, rhythm, and trends- Monitor for bleeding, hypotension and signs of decreased cardiac output- Evaluate effectiveness of vasoactive medications to optimize hemodynamic stability- Monitor arterial and/or venous puncture sites for bleeding and/or hematoma- Assess quality of pulses, skin color and temperature- Assess for signs of decreased coronary artery perfusion - ex. Angina- Evaluate fluid balance, assess for edema, trend weights  Outcome: Progressing  Goal: Absence of cardiac arrhythmias or at baseline  Description: INTERVENTIONS:- Continuous cardiac monitoring, monitor vital signs, obtain 12 lead EKG if indicated- Evaluate effectiveness of antiarrhythmic and heart rate control medications as ordered- Initiate emergency measures for life threatening arrhythmias- Monitor electrolytes and administer replacement therapy as ordered  Outcome: Progressing

## 2025-04-30 NOTE — PROGRESS NOTES
[facility]   part of [facility]     Hospitalist Progress Note     James Roberts Patient Status:  Observation    10/17/1945 MRN [id number]   Location [facility] 8NE-A Attending Riley Reaves, *   Hosp Day # 0 PCP PHYSICIAN NONSTAFF     Chief Complaint: SOB     Subjective:      - Pt endorsing ongoing neck pain, states this started acutely a few weeks ago after straining to get luggage out of the car for a trip   - States he would be unable to complete MRI d/t pain and difficulty lying flat, agreeable to re-trial of muscle relaxants and pain control    - Breathing well on RA     Objective:    Review of Systems:   A comprehensive review of systems was completed; pertinent positive and negatives stated in subjective.    Vital signs:  Temp:  [97.9 °F (36.6 °C)-99 °F (37.2 °C)] 97.9 °F (36.6 °C)  Pulse:  [] 83  Resp:  [10-23] 16  BP: (130-179)/(60-86) 162/73  SpO2:  [75 %-100 %] 99 %    Physical Exam:    General: No acute distress  Respiratory: no wheezes, no rhonchi  Cardiovascular: S1, S2, regular rate and rhythm  Abdomen: Soft, Non-tender, non-distended, positive bowel sounds  Neuro: No new focal deficits.   Extremities: no edema    Diagnostic Data:    Labs:  Recent Labs   Lab 25  1551 25  0607 25  1129 25  0502   WBC 8.3 9.2 7.8 5.6   HGB 10.0* 9.5* 9.2* 10.1*   MCV 93.7 93.9 90.3 87.6   .0 162.0 181.0 202.0   INR  --   --  1.21*  --        Recent Labs   Lab 25  1551 25  0607 25  1451 25  1129 25  0502   * 141*  --  102* 89   BUN 11 10  --  11 11   CREATSERUM 0.75 0.79 0.73 0.65* 0.69*   CA 9.4 9.6  --  9.8 9.9   ALB 4.0  --   --  4.3 4.2   * 133*  --  123* 128*   K 4.4 4.8  --  4.0 4.0   CL 97* 97*  --  92* 92*   CO2 29.0 28.0  --  28.0 26.0   ALKPHO 53  --   --  177* 160*   AST 16  --   --  42* 39*   ALT 18  --   --  60* 51*   BILT 0.8  --   --  1.5* 1.2*   TP 6.3  --   --  6.6 6.6       Estimated Creatinine  Clearance: 85.4 mL/min (A) (based on SCr of 0.69 mg/dL (L)).    Recent Labs   Lab 04/29/25  1129   TROPHS 17       Recent Labs   Lab 04/29/25  1129   PTP 15.4*   INR 1.21*                  Microbiology    No results found for this visit on 04/29/25.      Imaging: Reviewed in Epic.    Medications: Scheduled Medications[1]    Assessment & Plan:      #Acute hypoxic respiratory failure  #Possible new onset CHF  - Chest x-ray reviewed and shows trace bilateral pleural effusions  - CT chest from 4/25 reviewed and shows bilateral pleural effusions right greater than left  - Repeat CT chest reviewed    - proBNP elevated  - TTE with mild-mod pHTN ; preserved EF, G1DD   - IV Lasix continued   - Strict I's and O's, daily weights, fluid and salt restriction  - Cardiology consulted     #Elevated D-dimer  - CT chest as above without PE    #Transaminitis   - likely 2/2 hepatic congestion, monitor with diuresis     - CTA A/P without acute biliary/hepatic findings      # Neck strain   - CT c-spine without acute findings, likely muscle strain from story   - trial of multimodal pain control      #Hyponatremia, improving   - Possibly in setting of fluid overload  - Monitor with diuresis    # Malignant fibrous histiocytoma   - see as stable on CT chest      #CAD  - Aspirin, statin     #Hypertension  - Amlodipine, atenolol     #Hyperlipidemia  - Statin     #Hypothyroidism  - Levothyroxine     #Non-Hodgkin's lymphoma    Riley Reaves MD    Supplementary Documentation:     Quality:  DVT Mechanical Prophylaxis:   SCDs,    DVT Pharmacologic Prophylaxis   Medication    enoxaparin (Lovenox) 40 MG/0.4ML SUBQ injection 40 mg         DVT Pharmacologic prophylaxis: Aspirin 81 mg      Code Status: Not on file  Lewis: No urinary catheter in place  Lewis Duration (in days):   Central line:    JOSE:     The 21st Century Cures Act makes medical notes like these available to patients in the interest of transparency. Please be advised this is a  medical document. Medical documents are intended to carry relevant information, facts as evident, and the clinical opinion of the practitioner. The medical note is intended as peer to peer communication and may appear blunt or direct. It is written in medical language and may contain abbreviations or verbiage that are unfamiliar.                       [1]    amLODIPine  2.5 mg Oral Daily    [START ON 5/1/2025] aspirin  81 mg Oral QOD    atenolol  25 mg Oral Nightly    atorvastatin  10 mg Oral Nightly    levothyroxine  125 mcg Oral Before breakfast    pantoprazole  40 mg Oral BID    tamsulosin  0.4 mg Oral Daily    enoxaparin  40 mg Subcutaneous Daily    furosemide  40 mg Intravenous Daily

## 2025-05-01 NOTE — PROGRESS NOTES
Provider Clarification    Additional information on the patient's heart failure    Other (please specify): No heart failure     This note is part of the patient's medical record.

## 2025-05-01 NOTE — PROCEDURES
[facility]       James Roberts Location: Cath Lab    [facility] [id number] MRN [id number]   Admission Date 4/29/2025 Procedure Date 5/1/2025   Attending Physician Riley Reaves, * Procedure Physician Jamar Skinner MD         CARDIAC CATHETERIZATION REPORT     PREOPERATIVE DIAGNOSIS:  marked dyspnea on exertion, hx of PVD s/p iliac PTA (CAD equivalent), r/o ischemic etiology  POSTOPERATIVE DIAGNOSIS:  mild non-obstructive CAD.  PROCEDURE PERFORMED:  ultrasound guided right femoral artery access, left heart catheterization, left ventriculogram, selective coronary angiography      PROCEDURE:  The patient was brought to the cardiac catheterization lab in the fasting state.  Informed consent was obtained.  Moderate sedation was employed using a total of IV Versed 3mg and IV fentanyl 75mcg.  I directly observed the patient from 0917 to 0946, for a total of 29 minutes, and an independent trained observer was present and assisted in the monitoring of the patient's level of consciousness and physiological status, watching the heart rate, blood pressure, oximetry, and rhythm, in addition to total moderation time.      ACCESS/CATHETER PLACEMENT:  The groin was prepped and draped in a sterile manner, and the right groin area was anesthetized with 2% lidocaine area.  The right femoral artery was accessed with a micropuncture needle with ultrasound guidance, and a 6-Pashto 11 cm sheath was placed.  Left and right selective coronary angiography was performed using 6-Pashto JL4 and JR4 catheters respectively.  The left ventricular pressure was measured using a pigtail catheter and 30 degree BENITO ventriculogram was performed.  The catheter was pulled back across the aortic valve was performed with the same catheter.  At the conclusion of the study, right femoral artery angiography was performed to determine suitability for a closure; a Perclose suture was deployed, with excellent hemostasis.       FINDINGS:      1.  Left  heart catheterization:    Left ventricle: 137/10 mmHg  Aorta: 137/57/88 mmHg  Left ventriculogram demonstrated a LV ejection fraction of 55-60% without significant mitral regurgitation; there are no regional wall motion abnormalities.  There was no aortic stenosis upon pullback of the catheter.    2.  Selective coronary angiography:      Left main artery: The left main artery is a medium size, trifurcating vessel without significant/obstructive angiographic disease.    LAD:  The left anterior descending artery is a medium caliber vessel that wraps around the apex and gives rise to a small, mid diagonal branch.  The proximal LAD is calcified with mild, non-obstructive diffuse disease.      LCx: The left circumflex artery is a medium size, co-dominant vessel that gives rise to a small mid obtuse marginal, distal left PL branch and a medium size distal left PDA. The proximal LCx is calcified with mild diffuse disease,  mid LCx demonstrates a focal 50% stenosis.   The ramus is a small caliber vessel.     RCA:  The right coronary artery is a small caliber, co-dominant vessel that gives rise to a small-medium rPDA.  Mild diffuse disease.       MEDICATIONS:  See nursing record.     COMPLICATIONS:  No major complications were observed during this visit to the catheterization lab.     IMPRESSION:    1.  Left heart catheterization: LVEDP 10 mmHg, no aortic stenosis.  LVEF 55-60% without focal wall motion abnormalities. No signfiicant mitral regurgitation  2.  Coronary angiography:  co-dominant system  - LM: no significant angiographic disease  - LAD: mild proximal disease  - LCx: mild proximal disease, 50% mid stenosis  - RCA: mild diffuse disease    RECOMMENDATIONS: Angiographically, there are no significant/obstructive lesions to explain patient's marked dyspnea symptoms. LVEDP does not suggest congestion/heart failure.  No indication for coronary revascularization.

## 2025-05-01 NOTE — CONSULTS
[facility]  Report of Consultation    James Roberts Patient Status:  Inpatient    10/17/1945 MRN [id number]   Location [facility] 8NE-A Attending Riley Reaves, *   Hosp Day # 1 PCP PHYSICIAN NONSTAFF     Reason for Consultation:  Acute on chronic dyspnea with advancing bilateral pleural effusions    History of Present Illness:  James Roberts is a a(n) 79 year old male.  Remote smoker who denies any prior diagnosis of asthma or COPD.  Has a history of hypertension known coronary disease without intervention.  He reports malignant fibrous histiocytoma  with recurrence 2009  surgical intervention and radiation and  lymphoma Hodgkin's and non-Hodgkin's   and recurrence  treated with chemotherapy.  He reports having seen pulmonologist in 2016 for exertional dyspnea unclear any diagnosis at that time.  Since attempting to move and relocating here from [place] patient has had significant low back pain that is progressive intractable muscle tension type headaches.  In association with pain he has noted significant increase in dyspnea.  He was admitted  to  with severe headaches radiating down his neck and upper back.  He was discharged on pain medication with plans to follow-up with oncology and pulmonary.  Increasing shortness of breath and low O2 sats prompted readmission to the emergency room .  He underwent echo demonstrating grade 1 diastolic dysfunction with mild pulmonary hypertension 40 to 45 mmHg.  Today he underwent left heart cath with normal EF mild diffuse coronary disease with an LVEDP of 10 mmHg.  Patient denies any coughing no specific chest pain denies any rest symptoms other than ongoing neck and headache.  He has remained afebrile and remains on room air.    History:  Past Medical History[1]    Chronic coronary artery disease     Dysfunction of eustachian tube    Gastroesophageal reflux disease    Hyperlipidemia    Hypertension    Hypogonadism in male      Description: Per Dr. Byrne     Hypothyroidism    Non-Hodgkin's lymphoma (HCC)    Osteoarthritis   History of Frank's esophagus 2008  Family History[2]   reports that he quit smoking about 37 years ago. His smoking use included cigarettes. He has never used smokeless tobacco. He reports current alcohol use. He reports that he does not use drugs.  Retired former     Records review [facility]od[facility]plastic [facility]    Allergies:  Allergies[3]    Medications:  Prescriptions Prior to Admission[4]    Review of Systems:    Constitutional: Thinks his weight has been stable  Eyes: Denies any specific vision changes  Ears, nose, mouth, throat, and face: Denies any sore throat denies any aspiration tendency or swallowing difficulties  Respiratory: Dyspnea on exertion as above  Cardiovascular: Denies any palpitations  Gastrointestinal: Denies any specific reflux heartburn nausea vomiting diarrhea denies any history of ulcers or colitis  Musculoskeletal: Denies any lower extremity edema or pain has had claudication symptoms in the past  Describes history of eczema  Neurological: Notes some memory issues     All other review of systems are negative.  Denies any sleep disorder occasional hard to get to sleep states was never told he needs a sleep study    Vital signs in last 24 hours:  Patient Vitals for the past 24 hrs:   BP Temp Temp src Pulse Resp SpO2 Weight   05/01/25 1400 (!) 123/96 -- -- 90 11 100 % --   05/01/25 1300 141/67 -- -- 73 14 95 % --   05/01/25 1246 145/65 96.9 °F (36.1 °C) Oral 77 12 100 % --   05/01/25 1200 134/61 -- -- 74 10 99 % --   05/01/25 1130 139/64 -- -- 75 13 98 % --   05/01/25 1100 137/73 -- -- 67 12 95 % --   05/01/25 1045 141/79 -- -- 69 14 99 % --   05/01/25 1030 134/71 -- -- 75 20 96 % --   05/01/25 1015 118/79 -- -- 74 16 95 % --   05/01/25 1002 109/66 -- -- 71 20 -- --   05/01/25 0800 132/72 98.6 °F (37 °C) Oral 78 18 93 % --   05/01/25 2948  146/68 98 °F (36.7 °C) Oral 84 21 -- 157 lb 13.6 oz (71.6 kg)   04/30/25 2300 133/57 98 °F (36.7 °C) Oral 87 14 -- --   04/30/25 1900 136/60 97.9 °F (36.6 °C) Oral 88 11 (!) 82 % --         Physical Exam:   General: alert, cooperative, oriented.  Occasionally confused on details no respiratory distress.   Head: Normocephalic, without obvious abnormality, atraumatic.   Eyes/Nose: No obvious lesions small small posterior small posterior pharyngeal area   Throat: Lips, mucosa, and tongue normal.  No thrush noted.   Neck: trachea midline, no adenopathy, no thyromegaly.  No obvious JVD on the right with some postop changes-questionable JVD on the left   Lungs: Bilateral dullness decent breath sounds no auscultated wheeze or rales   Chest wall: Postop changes with mass lesion nontender right upper chest area notes   Heart: Heart tones are mildly diminished   Abdomen: soft, non-distended, no masses, no guarding, no     Rebound.  No obvious hepatosplenomegaly or ascites   Extremity: No significant edema no clubbing   Skin: No rashes or lesions.   Neurological: Alert, interactive, no focal deficits    Lab Data Review:  Lab Results   Component Value Date    CREATSERUM 0.78 05/01/2025    CREATSERUM 0.78 05/01/2025    BUN 14 05/01/2025    BUN 14 05/01/2025     05/01/2025     05/01/2025    K 4.5 05/01/2025    K 4.5 05/01/2025    CL 91 05/01/2025    CL 91 05/01/2025    CO2 27.0 05/01/2025    CO2 27.0 05/01/2025     05/01/2025     05/01/2025    CA 9.2 05/01/2025    CA 9.2 05/01/2025    ALB 4.1 05/01/2025    ALKPHO 135 05/01/2025    BILT 1.1 05/01/2025    TP 6.4 05/01/2025    AST 39 05/01/2025    ALT 46 05/01/2025    TSH 3.566 05/01/2025    MG 1.9 05/01/2025    PHOS 4.2 05/01/2025       Cultures:   Negative COVID and RSV    Radiology:  No results found.  CT chest reviewed-mediastinal adenopathy subcarinal 2.3 slight increase from 2022 no evidence of hilar adenopathy  Advancing bilateral pleural  effusions-over the past 4 days  PET scan 2022 noted with soft tissue nodule left inguinal canal negative) chest    Assessment and Plan:  Problem List[5]    Assessment:  Dyspnea with mild hypoxia-etiology to be determined however evidence of fairly rapidly advancing bilateral pleural effusions with the etiology to be determined.  Concerns could include constrictive physiology with plans to consider right heart cath/discussed with cardiology.  In the interim to proceed with thoracentesis.  History of Hodgkin's 2004 and non-Hodgkin's lymphoma  History of malignant fibrous histiocytoma-2005 with recurrence 2009 status postsurgical resection and radiation  Severe neck pain back pain thought related to moving-symptoms somewhat improved though remains a chief complaint      Plan:  Review of records as available  Plan for thoracentesis in a.m.  Will review with cardiology  May need follow-up PET scan  Further recommendations pending    Ebonise Verona MD  5/1/2025  4:41 PM         [1]   Past Medical History:   Chronic coronary artery disease    Dysfunction of eustachian tube    Gastroesophageal reflux disease    Hyperlipidemia    Hypertension    Hypogonadism in male    Description: Per Dr. Byrne     Hypothyroidism    Non-Hodgkin's lymphoma (HCC)    Osteoarthritis   [2]   Family History  Problem Relation Age of Onset    Alcohol and Other Disorders Associated Other     High Blood Pressure Other     Diabetes Other     Heart Disease Other    [3]   Allergies  Allergen Reactions    Celebrex  [Celecoxib]    [4]   Medications Prior to Admission   Medication Sig Dispense Refill Last Dose/Taking    amLODIPine 5 MG Oral Tab Take 0.5 tablets (2.5 mg total) by mouth daily.   4/29/2025 Morning    atenolol 25 MG Oral Tab Take 1 tablet (25 mg total) by mouth daily. (Patient taking differently: Take 1 tablet (25 mg total) by mouth at bedtime.)   4/29/2025 Morning    atorvastatin 10 MG Oral Tab Take 1 tablet (10 mg total) by mouth nightly.    4/28/2025 Bedtime    tamsulosin 0.4 MG Oral Cap Take 1 capsule (0.4 mg total) by mouth daily.   4/28/2025    levothyroxine 125 MCG Oral Tab Take 1 tablet (125 mcg total) by mouth before breakfast.  0 4/29/2025    triamcinolone acetonide 0.025 % External Cream Apply topically in the morning and before bedtime.   Past Month    aspirin 81 MG Oral Tab Take 1 tablet (81 mg total) by mouth every other day.   4/29/2025    Bioflavonoid Products (DESIRAE C OR) Take 500 mg by mouth in the morning.   4/29/2025    Multiple Vitamins-Minerals (MULTIVITAMIN OR) Take by mouth in the morning.   4/29/2025    pantoprazole 40 MG Oral Tab EC Take 1 tablet (40 mg total) by mouth in the morning and 1 tablet (40 mg total) before bedtime.   4/29/2025   [5]   Patient Active Problem List  Diagnosis    Chronic coronary artery disease    Gastroesophageal reflux disease    Hypogonadism in male    Non-Hodgkin's lymphoma (HCC)    Osteoarthritis    Mixed hyperlipidemia    Hiatal hernia    Enlarged prostate    Lung nodule    Essential hypertension    Acquired hypothyroidism    Screening for colon cancer    PAOD (peripheral arterial occlusive disease)    Abdominal pain, generalized    Acute pancreatitis, unspecified complication status, unspecified pancreatitis type (HCC)    Hyponatremia    Dehydration    Constipation, unspecified constipation type    Anemia    Hyperglycemia    Acute intractable tension-type headache    Neck pain    Anemia, unspecified type    Unilateral inguinal hernia without obstruction or gangrene, recurrence not specified    Cervical radicular pain    Acute on chronic congestive heart failure, unspecified heart failure type (HCC)    Acute respiratory failure with hypoxia (HCC)    Positive D dimer    New onset of congestive heart failure (HCC)    Hypoxia

## 2025-05-01 NOTE — PLAN OF CARE
PT A&OX4, on RA, NSR/ 1st AV Block on tele.   Denied CP. Denied SOB. Complaint of neck and shoulder pain, Tylenol schedule given  Pt reported BM 4/30  Urinal use  Updated POC to pt, all needs meet at this time.   Call light within reach, bed alarm on for pt safety.     POC:   IV lasix daily  Npo-->cath lab 5/1    Problem: Patient/Family Goals  Goal: Patient/Family Long Term Goal  Description: Patient's Long Term Goal: stay out of the hospital  Interventions:  - take meds as prescribed  - attend follow up appointments  - See additional Care Plan goals for specific interventions  Outcome: Progressing  Goal: Patient/Family Short Term Goal  Description: Patient's Short Term Goal: go home  Interventions:   - meds, labs, tele monitoring  - ECHO  - consults to see  - See additional Care Plan goals for specific interventions  Outcome: Progressing     Problem: CARDIOVASCULAR - ADULT  Goal: Maintains optimal cardiac output and hemodynamic stability  Description: INTERVENTIONS:- Monitor vital signs, rhythm, and trends- Monitor for bleeding, hypotension and signs of decreased cardiac output- Evaluate effectiveness of vasoactive medications to optimize hemodynamic stability- Monitor arterial and/or venous puncture sites for bleeding and/or hematoma- Assess quality of pulses, skin color and temperature- Assess for signs of decreased coronary artery perfusion - ex. Angina- Evaluate fluid balance, assess for edema, trend weights  Outcome: Progressing  Goal: Absence of cardiac arrhythmias or at baseline  Description: INTERVENTIONS:- Continuous cardiac monitoring, monitor vital signs, obtain 12 lead EKG if indicated- Evaluate effectiveness of antiarrhythmic and heart rate control medications as ordered- Initiate emergency measures for life threatening arrhythmias- Monitor electrolytes and administer replacement therapy as ordered  Outcome: Progressing     Problem: PAIN - ADULT  Goal: Verbalizes/displays adequate comfort level or  patient's stated pain goal  Description: INTERVENTIONS:- Encourage pt to monitor pain and request assistance- Assess pain using appropriate pain scale- Administer analgesics based on type and severity of pain and evaluate response- Implement non-pharmacological measures as appropriate and evaluate response- Consider cultural and social influences on pain and pain management- Manage/alleviate anxiety- Utilize distraction and/or relaxation techniques- Monitor for opioid side effects- Notify MD/LIP if interventions unsuccessful or patient reports new pain- Anticipate increased pain with activity and pre-medicate as appropriate  Outcome: Progressing

## 2025-05-01 NOTE — PLAN OF CARE
Received pt at 0730.   Pt is A&Ox4, complaints of headache- scheduled tylenol given. On room air, lungs are diminished, no coughing. NSR, 1st degree AVB, no chest pain. Continent of B&B, active bowel sounds, last BM 4-30. Fluids at bedside for angiogram, NPO. Pt updated with plan of care.     Approx. 10 AM- patient returned from cath lab- no intervention, R femoral groin accessed- site clean/dry/soft, no drainage.       Problem: Patient/Family Goals  Goal: Patient/Family Long Term Goal  Description: Patient's Long Term Goal: stay out of the hospital    Interventions:  - take meds as prescribed  - attend follow up appointments  - See additional Care Plan goals for specific interventions  Outcome: Progressing  Goal: Patient/Family Short Term Goal  Description: Patient's Short Term Goal: go home  No pain 5-1    Interventions:   - meds, labs, tele monitoring  - ECHO  - consults to see  - PRN pain meds, hot/cold packs, tell RN if in pain  - See additional Care Plan goals for specific interventions  Outcome: Progressing     Problem: CARDIOVASCULAR - ADULT  Goal: Maintains optimal cardiac output and hemodynamic stability  Description: INTERVENTIONS:- Monitor vital signs, rhythm, and trends- Monitor for bleeding, hypotension and signs of decreased cardiac output- Evaluate effectiveness of vasoactive medications to optimize hemodynamic stability- Monitor arterial and/or venous puncture sites for bleeding and/or hematoma- Assess quality of pulses, skin color and temperature- Assess for signs of decreased coronary artery perfusion - ex. Angina- Evaluate fluid balance, assess for edema, trend weights  Outcome: Progressing  Goal: Absence of cardiac arrhythmias or at baseline  Description: INTERVENTIONS:- Continuous cardiac monitoring, monitor vital signs, obtain 12 lead EKG if indicated- Evaluate effectiveness of antiarrhythmic and heart rate control medications as ordered- Initiate emergency measures for life threatening  arrhythmias- Monitor electrolytes and administer replacement therapy as ordered  Outcome: Progressing     Problem: PAIN - ADULT  Goal: Verbalizes/displays adequate comfort level or patient's stated pain goal  Description: INTERVENTIONS:- Encourage pt to monitor pain and request assistance- Assess pain using appropriate pain scale- Administer analgesics based on type and severity of pain and evaluate response- Implement non-pharmacological measures as appropriate and evaluate response- Consider cultural and social influences on pain and pain management- Manage/alleviate anxiety- Utilize distraction and/or relaxation techniques- Monitor for opioid side effects- Notify MD/LIP if interventions unsuccessful or patient reports new pain- Anticipate increased pain with activity and pre-medicate as appropriate  Outcome: Progressing

## 2025-05-01 NOTE — PROGRESS NOTES
[facility]   part of [facility]     Hospitalist Progress Note     James Roberts Patient Status:  Observation    10/17/1945 MRN [id number]   Location [facility] 8NE-A Attending Jean [place], Riley Saleem, *   Hosp Day # 1 PCP PHYSICIAN NONSTAFF     Chief Complaint: SOB     Subjective:      - s/p LHC today with findings of no significant obstructive disease and normal LVEDP    - Pt complaining of whole body pain now, still having ongoing neck pain as well   - Now satting well, on RA      Objective:  Review of Systems:   A comprehensive review of systems was completed; pertinent positive and negatives stated in subjective.     Vital signs:  Temp:  [97.6 °F (36.4 °C)-98 °F (36.7 °C)] 98 °F (36.7 °C)  Pulse:  [78-88] 78  Resp:  [11-21] 18  BP: (130-146)/(57-83) 132/72  SpO2:  [82 %-100 %] 93 %     Physical Exam:    General: No acute distress  Respiratory: no wheezes, no rhonchi  Cardiovascular: S1, S2, regular rate and rhythm  Abdomen: Soft, Non-tender, non-distended, positive bowel sounds. Right groin with dressing c/d/I, no swelling, ecchymosis or ttp   Neuro: No new focal deficits.   Extremities: no edema     Diagnostic Data:    Labs:       Recent Labs   Lab 25  1129 25  0502   WBC 7.8 5.6   HGB 9.2* 10.1*   MCV 90.3 87.6   .0 202.0   INR 1.21*  --                Recent Labs   Lab 25  1129 25  0502 25  0517   * 89 108*  108*   BUN 11 11 14  14   CREATSERUM 0.65* 0.69* 0.78  0.78   CA 9.8 9.9 9.2  9.2   ALB 4.3 4.2 4.1   * 128* 125*  125*   K 4.0 4.0 4.5  4.5   CL 92* 92* 91*  91*   CO2 28.0 26.0 27.0  27.0   ALKPHO 177* 160* 135*   AST 42* 39* 39*   ALT 60* 51* 46   BILT 1.5* 1.2* 1.1   TP 6.6 6.6 6.4         Estimated Creatinine Clearance: 75.5 mL/min (based on SCr of 0.78 mg/dL).         Recent Labs   Lab 25  1129   TROPHS 17             Recent Labs   Lab 25  1129   PTP 15.4*   INR 1.21*               Lab Results   Component Value  Date     TSH 3.566 05/01/2025                  Microbiology     No results found for this visit on 04/29/25.        Imaging: Reviewed in Epic.     Medications: [Scheduled Medications]    [Scheduled Medications]   amLODIPine  5 mg Oral Daily    acetaminophen  1,000 mg Oral TID & HS    methocarbamol  500 mg Oral TID    lidocaine-menthol  1 patch Transdermal Daily    aspirin  81 mg Oral QOD    atenolol  25 mg Oral Nightly    atorvastatin  10 mg Oral Nightly    levothyroxine  125 mcg Oral Before breakfast    pantoprazole  40 mg Oral BID    tamsulosin  0.4 mg Oral Daily    enoxaparin  40 mg Subcutaneous Daily    furosemide  40 mg Intravenous Daily        Assessment & Plan:  #Acute hypoxic respiratory failure, resolved   #Possible new onset CHF, ruled out   - Chest x-ray reviewed and shows trace bilateral pleural effusions  - CT chest from 4/25 reviewed and shows bilateral pleural effusions right greater than left  - Repeat CT chest reviewed; shows worsening bilateral pleural effusions, given negative LHC, will have pulm consulted, may require thoracentesis given new pleural effusions   - oncology consulted, possibly malignancy associated effusions?   - TTE with mild-mod pHTN ; preserved EF, G1DD   - IV Lasix continued   - s/p LHC today with findings of no significant obstructive disease and normal LVEDP   - Strict I's and O's, daily weights, fluid and salt restriction  - Cardiology consulted     #Elevated D-dimer  - CT chest as above without PE     #Transaminitis   - likely 2/2 hepatic congestion, monitor with diuresis     - CTA A/P without acute biliary/hepatic findings       # Neck strain   - CT c-spine without acute findings, likely muscle strain from story   - trial of multimodal pain control      #Hyponatremia, stable    - UA, Urine sodium/osms ordered   - Nephrology consulted     # Malignant fibrous histiocytoma  # Non-Hodgkin's lymphoma   - oncology on consult; has been lost to f/u previously      #CAD  - Aspirin,  statin     #Hypertension  - Amlodipine, atenolol     #Hyperlipidemia  - Statin     #Hypothyroidism  - Levothyroxine    Supplementary Documentation:     Quality:  DVT Mechanical Prophylaxis:   SCDs,    DVT Pharmacologic Prophylaxis   Medication    enoxaparin (Lovenox) 40 MG/0.4ML SUBQ injection 40 mg         DVT Pharmacologic prophylaxis: Aspirin 81 mg      Code Status: Not on file  Lewis: No urinary catheter in place  Lewis Duration (in days):   Central line:    JOSE: 5/2/2025    The 21st Century Cures Act makes medical notes like these available to patients in the interest of transparency. Please be advised this is a medical document. Medical documents are intended to carry relevant information, facts as evident, and the clinical opinion of the practitioner. The medical note is intended as peer to peer communication and may appear blunt or direct. It is written in medical language and may contain abbreviations or verbiage that are unfamiliar.

## 2025-05-01 NOTE — PROGRESS NOTES
[facility]  Progress Note    James Roberts Patient Status:  Inpatient    10/17/1945 MRN [id number]   Location [facility] 8NE-A Attending Riley Reaves, *   Hosp Day # 1 PCP PHYSICIAN NONSTAFF     Subjective:   No chest pain.  No shortness of breath.  No complaints at RFA cath site.  No focal cardiovascular complaints reported.    Objective:  Vitals:    25 1246 25 1300 25 1400 25 1651   BP: 145/65 141/67 (!) 123/96 106/58   BP Location: Left arm Left arm Left arm Left arm   Pulse: 77 73 90 90   Resp: 12 14 11 10   Temp: 96.9 °F (36.1 °C)   97.4 °F (36.3 °C)   TempSrc: Oral   Oral   SpO2: 100% 95% 100% 100%   Weight:           Temp (24hrs), Av.8 °F (36.6 °C), Min:96.9 °F (36.1 °C), Max:98.6 °F (37 °C)      Medications:   Scheduled:   Scheduled Medications[1]    Continuous Infusion:   Medication Infusions[2]    PRN Medications:   PRN Medications[3]    Intake/Output:     Intake/Output Summary (Last 24 hours) at 2025 1703  Last data filed at 2025 1615  Gross per 24 hour   Intake 570 ml   Output 1650 ml   Net -1080 ml       Wt Readings from Last 3 Encounters:   25 157 lb 13.6 oz (71.6 kg)   25 159 lb (72.1 kg)   24 172 lb (78 kg)       Allergies:  Allergies[4]    Physical Exam:   General:  Well-developed / Well-nourished.  No acute distress.  HEENT:  Normocephalic.  Atraumatic.  No icterus.  Neck:  There is no jugular venous distention.   Cardiovascular:  Cardiovascular examination demonstrates a regular rate and rhythm.  There is normal S1, S2.  There is no S3 or S4.  There are no murmurs, rubs, or gallops.  No click is appreciated.  PMI is nondisplaced with a normal apical impulse.    Pulmonary:  Lungs are clear to auscultation bilaterally.  There are no focal rales, rhonchi, or wheezes.  Good air movement is noted throughout both lung fields.   Abdomen:  The abdomen is soft, non-distended, and non-tender.  Bowel sounds are present and  normoactive.  No organomegaly is appreciated.  Extremities:  Extremities do not demonstrate any evidence of peripheral edema.   No cyanosis or clubbing of the digits is appreciated.  No hematoma R FA.    Neurologic:  Alert and oriented.  Normal affect.  Integument:  No visible rashes are appreciated.      Laboratory/Data:   Recent Labs   Lab 04/29/25  1129 04/30/25  0502 05/01/25  0517   * 89 108*  108*   BUN 11 11 14  14   CREATSERUM 0.65* 0.69* 0.78  0.78   CA 9.8 9.9 9.2  9.2   ALB 4.3 4.2 4.1   * 128* 125*  125*   K 4.0 4.0 4.5  4.5   CL 92* 92* 91*  91*   CO2 28.0 26.0 27.0  27.0   ALKPHO 177* 160* 135*   AST 42* 39* 39*   ALT 60* 51* 46   BILT 1.5* 1.2* 1.1   TP 6.6 6.6 6.4       Recent Labs   Lab 04/29/25  1129 04/30/25  0502   RBC 3.08* 3.38*   HGB 9.2* 10.1*   HCT 27.8* 29.6*   MCV 90.3 87.6   MCH 29.9 29.9   MCHC 33.1 34.1   RDW 18.6 18.5   NEPRELIM 5.71 3.42   WBC 7.8 5.6   .0 202.0       Recent Labs   Lab 04/29/25  1129   PTP 15.4*   INR 1.21*       No results for input(s): \"TROP\", \"CK\" in the last 168 hours.      Tele:     Diagnostics:    Echo:   Conclusions:     1. Left ventricle: The cavity size was normal. Wall thickness was normal.      Systolic function was normal. The estimated ejection fraction was 55-60%,      by visual assessment. No diagnostic evidence for regional wall motion      abnormalities. Doppler parameters are consistent with abnormal left      ventricular relaxation - grade 1 diastolic dysfunction.   2. Right ventricle: The cavity size was normal. Systolic function was      normal. Systolic pressure was mildly increased.   3. Aortic valve: The valve was trileaflet. The leaflets were mildly      calcified.   4. Mitral valve: There was mild regurgitation.   5. Tricuspid valve: There was mild regurgitation.   6. Pulmonary arteries: Systolic pressure was mildly increased, in the range      of 40mm Hg to 45mm Hg. Estimated pulmonary artery diastolic pressure  was      15mm Hg.     Cath:   IMPRESSION:    1.  Left heart catheterization: LVEDP 10 mmHg, no aortic stenosis.  LVEF 55-60% without focal wall motion abnormalities. No signfiicant mitral regurgitation  2.  Coronary angiography:  co-dominant system  - LM: no significant angiographic disease  - LAD: mild proximal disease  - LCx: mild proximal disease, 50% mid stenosis  - RCA: mild diffuse disease    Assessment:    HTN   HL   SOB  -HFpEF  -Component of COPD  4.    Hypothyroidism  5.    PVD                -S/P LE percutaneous intervention in the past                -Claudication is noted  6.   Smoking Hx  7.   ?Component of COPD  8.   Hyponatremia / SIADH  9.   Anemia  10.  Elevated LFTs  11.  Elevated D-Dimer                -CT negative for PE  12.  Pleural effusion on CT  13.  Non-obstructive CAD on cath today  14.  H/O NHL    Plan:   Renal following - Lasix on hold.  Tolvaptan given.   ASA   BB   Statin   Norvasc   Pulmonary / Oncology evaluations ongoing.      Nathaniel Jeffries MD  5/1/2025  5:03 PM         [1]    amLODIPine  5 mg Oral Daily    acetaminophen  1,000 mg Oral TID & HS    methocarbamol  500 mg Oral TID    lidocaine-menthol  1 patch Transdermal Daily    aspirin  81 mg Oral QOD    atenolol  25 mg Oral Nightly    atorvastatin  10 mg Oral Nightly    levothyroxine  125 mcg Oral Before breakfast    pantoprazole  40 mg Oral BID    tamsulosin  0.4 mg Oral Daily    enoxaparin  40 mg Subcutaneous Daily   [2] [3]   hydrALAzine    melatonin    guaiFENesin ER    benzonatate    glycerin-hypromellose-    sodium chloride    ondansetron    metoclopramide    polyethylene glycol (PEG 3350)    sennosides    bisacodyl    fleet enema  [4]   Allergies  Allergen Reactions    Celebrex  [Celecoxib]

## 2025-05-01 NOTE — CONSULTS
Hematology/Oncology Initial Consultation Note    Patient Name: James Roberts  Medical Record Number: [id number]    YOB: 1945   Date of Consultation: 5/1/2025   Physician requesting consultation: Dr. Reaves     Reason for Consultation:  James Roberts was seen today for the diagnosis of likely follicular lymphoma    Oncologic History:    2004: Follicular lymphoma. Likely Stage IV due to bone marrow biopsy. S/p Rituximab and fludarabine. 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  He had recurrence in 2009, which was excised and had radiation.  2011 was noted to have progression of follicular lymphoma.  He had Zevalin therapy in December 2011.      CT scan from 2022 did not reveal any residual disease..    Patient relocated to [place].  He mentions he was seen by [place] [facility] [place] where he was being evaluated for leukopenia    April 2025:   CT chest: There is an ovoid peripheral enhancing collection arising from the anterior superior chest wall anterior to the right 1st through 3rd ribs measuring 7.3 x 2.1 x 5.7 cm.  There are some surgical clips anterior and superior to this chest wall   mass/collection     29 April 2025  Moderate to large layering bilateral pleural effusions, increased.  Mild associated basilar consolidation may be passive atelectasis.    3. Stable right chest wall mass.   4. Stable mediastinal lymph nodes.  Single, subcarinal enlarged by size criteria.         =============================================================  History of Present Illness:      79-year-old male, hypertension, coronary artery disease and prior history of follicular lymphoma treated with rituximab and fludarabine in 2004 status post recurrence in 2011, malignant fibrous histiocytoma diagnosed in 2005 with recurrence in 2009 s/p wide local excision and radiation presents to the hospital with worsening  shortness of breath.  Patient was recently moving back here from [place], was noted to have worsening dyspnea..  Patient was noted to have low oxygen saturation, and was admitted.  Patient had a CT on presentation that revealed bilateral pulmonary effusion.  Had an echocardiogram revealing mild pulmonary hypertension.    Patient denies having any fevers, weight changes or appetite changes.  Mentioned that her A couple of nights of sweats on his back but no drenching night sweats.  Denies having any sick contact.    Past Medical History:  Past Medical History[1]    Past Surgical History[2]    Home Medications:  Medications Ordered Prior to Encounter[3]    Current Inpatient Medications:  Inpatient Meds:  Scheduled Medications[4]  Medication Infusions[5]    PRN Meds:  PRN Medications[6]    Allergies:   Allergies[7]    Psychosocial History:  Social History     Social History Narrative    Not on file     Short Social Hx on File[8]    Family Medical History:  Family History[9]    Review of Systems:  A 10-point ROS was done with pertinent positives and negative per the HPI    Vital Signs:  Height: --  Weight: 71.6 kg (157 lb 13.6 oz) (05/01 0500)  BSA (Calculated - sq m): --  Pulse: 90 (05/01 1400)  BP: 123/96 (05/01 1400)  Temp: 96.9 °F (36.1 °C) (05/01 1246)  Do Not Use - Resp Rate: --  SpO2: 100 % (05/01 1400)      Wt Readings from Last 6 Encounters:   05/01/25 71.6 kg (157 lb 13.6 oz)   04/23/25 72.1 kg (159 lb)   06/13/24 78 kg (172 lb)   12/27/22 75.8 kg (167 lb)   05/21/22 77.6 kg (171 lb)   08/08/19 84 kg (185 lb 3.2 oz)       ECOG PS: 1    Physical Examination:  General: Patient is alert and oriented, not in acute distress  Psych: Mood and affect are appropriate  Eyes: EOMI, PERRL  ENT: Oropharynx is clear, no adenopathy  CV: Regular rate   Respiratory: Decreased breath sounds bilaterally  GI/Abd: Soft  Neurological: Grossly intact   Lymphatics: No palpable cervical, supraclavicular, axillary,   lymphadenopathy  Mass noted in the supraclavicular notch  Skin: no rashes or petechiae    Laboratory:  Recent Labs   Lab 04/29/25  1129 04/30/25  0502   WBC 7.8 5.6   HGB 9.2* 10.1*   HCT 27.8* 29.6*   .0 202.0   MCV 90.3 87.6   RDW 18.6 18.5   NEPRELIM 5.71 3.42       Recent Labs   Lab 04/29/25  1129 04/30/25  0502 05/01/25  0517   * 128* 125*  125*   K 4.0 4.0 4.5  4.5   CL 92* 92* 91*  91*   CO2 28.0 26.0 27.0  27.0   BUN 11 11 14  14   CREATSERUM 0.65* 0.69* 0.78  0.78   * 89 108*  108*   CA 9.8 9.9 9.2  9.2   PHOS  --   --  4.2   TP 6.6 6.6 6.4   ALB 4.3 4.2 4.1   ALKPHO 177* 160* 135*   AST 42* 39* 39*   ALT 60* 51* 46   BILT 1.5* 1.2* 1.1       Recent Labs   Lab 04/29/25  1129   INR 1.21*   PTT 32.7       Imaging:    CT chest and abdomen reviewed     Impression & Plan:     79 year old male with prior history of follicular lymphoma, malignant fibrous histiocytoma  Currently admitted with bilateral pulmonary effusion.    Bilateral pulmonary effusion  - Increasing in size, etiology is unknown.  Patient is being evaluated by pulmonology for further evaluation.  Patient is undergoing thoracentesis with appreciated can send for cytology, flow cytometry LDH, albumin.    Follicular lymphoma s/p rituximab and fludarabine  - s/p 6 cycles of fludarabine and rituximab in 2004, treated with ibritumomab tiuxetan in 2011.   - CT of the abdomen revealed a mediastinal lymph node 2.3 into 1.5 cm in size.,  There were scattered small lymph nodes seen in the mediastinum, hilum and subcarinal  - Patient has no constitutional symptoms.  Noted to have mild anemia.  Will evaluate patient with a PET/CT and bone marrow biopsy as an outpatient.      Mild anemia with hemoglobin of 9.7, normal white count, platelet count.  Noted to have lymphocytopenia.  -Check LDH, iron studies, vitamin B12, folate, monoclonal protein        Fibrous histiocytoma, also known as undifferentiated pleomorphic sarcoma  -Treated  with wide local excision, radiation in 2005 and 2009.  - No other known sites of recurrence.    Oncology will follow along peripherally for pleural fluid evaluation.     We will schedule outpatient follow up for PET CT               Mariaa Estrella MD  [facility]  Erika WDesmond Gary [facility] [place]                [1]   Past Medical History:   Chronic coronary artery disease    Dysfunction of eustachian tube    Gastroesophageal reflux disease    Hyperlipidemia    Hypertension    Hypogonadism in male    Description: Per Dr. Byrne     Hypothyroidism    Non-Hodgkin's lymphoma (HCC)    Osteoarthritis   [2]   Past Surgical History:  Procedure Laterality Date    Bowel resection      Cath angio  5/1/2025    Colectomy      Hernia surgery      Rotator cuff repair Right     Tonsillectomy     [3]   Current Facility-Administered Medications on File Prior to Encounter   Medication Dose Route Frequency Provider Last Rate Last Admin    [COMPLETED] iopamidol 76% (ISOVUE-370) injection for power injector  70 mL Intravenous ONCE PRN Nina Seth MD   70 mL at 04/25/25 1803    [COMPLETED] morphINE PF 4 MG/ML injection 4 mg  4 mg Intravenous Once Leatha Green MD   4 mg at 04/23/25 1546    [COMPLETED] ondansetron (Zofran) 4 MG/2ML injection 4 mg  4 mg Intravenous Once Leatha Green MD   4 mg at 04/23/25 1546    [COMPLETED] sodium chloride 0.9 % IV bolus 1,000 mL  1,000 mL Intravenous Once Leatha Green MD   Stopped at 04/23/25 1828    [COMPLETED] iopamidol 76% (ISOVUE-370) injection for power injector  60 mL Intravenous ONCE PRN Leatha Green MD   60 mL at 04/23/25 1710    [COMPLETED] morphINE PF 4 MG/ML injection 4 mg  4 mg Intravenous Once Leatha Green MD   4 mg at 04/23/25 2026     Current Outpatient Medications on File Prior to Encounter   Medication Sig Dispense Refill    amLODIPine 5 MG Oral Tab Take 0.5 tablets (2.5 mg  total) by mouth daily.      atenolol 25 MG Oral Tab Take 1 tablet (25 mg total) by mouth daily. (Patient taking differently: Take 1 tablet (25 mg total) by mouth at bedtime.)      atorvastatin 10 MG Oral Tab Take 1 tablet (10 mg total) by mouth nightly.      tamsulosin 0.4 MG Oral Cap Take 1 capsule (0.4 mg total) by mouth daily.      levothyroxine 125 MCG Oral Tab Take 1 tablet (125 mcg total) by mouth before breakfast.  0    triamcinolone acetonide 0.025 % External Cream Apply topically in the morning and before bedtime.      aspirin 81 MG Oral Tab Take 1 tablet (81 mg total) by mouth every other day.      Bioflavonoid Products (DESIRAE C OR) Take 500 mg by mouth in the morning.      Multiple Vitamins-Minerals (MULTIVITAMIN OR) Take by mouth in the morning.      pantoprazole 40 MG Oral Tab EC Take 1 tablet (40 mg total) by mouth in the morning and 1 tablet (40 mg total) before bedtime.     [4]    amLODIPine  5 mg Oral Daily    acetaminophen  1,000 mg Oral TID & HS    methocarbamol  500 mg Oral TID    lidocaine-menthol  1 patch Transdermal Daily    aspirin  81 mg Oral QOD    atenolol  25 mg Oral Nightly    atorvastatin  10 mg Oral Nightly    levothyroxine  125 mcg Oral Before breakfast    pantoprazole  40 mg Oral BID    tamsulosin  0.4 mg Oral Daily    enoxaparin  40 mg Subcutaneous Daily   [5] [6]   hydrALAzine    melatonin    guaiFENesin ER    benzonatate    glycerin-hypromellose-    sodium chloride    ondansetron    metoclopramide    polyethylene glycol (PEG 3350)    sennosides    bisacodyl    fleet enema  [7]   Allergies  Allergen Reactions    Celebrex  [Celecoxib]    [8]   Social History  Socioeconomic History    Marital status:    Tobacco Use    Smoking status: Former     Current packs/day: 0.00     Types: Cigarettes     Quit date: 1988     Years since quittin.3    Smokeless tobacco: Never   Vaping Use    Vaping status: Never Used   Substance and Sexual Activity    Alcohol use: Yes      Comment: couple beers a day but nothing since [facility]    Drug use: No     Social Drivers of Health     Food Insecurity: No Food Insecurity (4/29/2025)    NCSS - Food Insecurity     Worried About Running Out of Food in the Last Year: No     Ran Out of Food in the Last Year: No   Transportation Needs: No Transportation Needs (4/29/2025)    NCSS - Transportation     Lack of Transportation: No   Housing Stability: Not At Risk (4/29/2025)    NCSS - Housing/Utilities     Has Housing: Yes     Worried About Losing Housing: No     Unable to Get Utilities: No   [9]   Family History  Problem Relation Age of Onset    Alcohol and Other Disorders Associated Other     High Blood Pressure Other     Diabetes Other     Heart Disease Other

## 2025-05-01 NOTE — CONSULTS
[facility]  Report of Consultation    James Roberts Patient Status:  Inpatient    10/17/1945 MRN [id number]   Location [facility] 8NE-A Attending Riley Reaves, *   Hosp Day # 1 PCP PHYSICIAN NONSTAFF     Reason for Consultation:  Hyponatremia     History of Present Illness:  James Roberts is a a(n) 79 year old man with mult med probs incl hx remote non-hodkins lymphoma, CAD, HTN, chronically low NA levels who presented  for eval of SOB.  W/u suggested possible CHF and he was given IV furosemide.  Na was 123 meq/l on admit, 128 yest and 125 meq/l today.  R/L HC done today with no suggestion of CHF.  B/l na levels appear to be in the low 130s generally    History:  Past Medical History[1]  Past Surgical History[2]  Family History[3]   reports that he quit smoking about 37 years ago. His smoking use included cigarettes. He has never used smokeless tobacco. He reports current alcohol use. He reports that he does not use drugs.    Allergies:  Allergies[4]    Medications:  Current Hospital Medications[5]  Prior to Admission Medications[6]    Review of Systems:  Denies fever/chills  Denies wt loss/gain  Denies HA or visual changes  Denies CP or palpitations  Denies SOB/cough/hemoptysis  Denies abd or flank pain  Denies N/V/D  Denies change in urinary habits or gross hematuria  Denies LE edema  Denies skin rashes/myalgias/arthralgias      Physical Exam:   /67 (BP Location: Left arm)   Pulse 73   Temp 96.9 °F (36.1 °C) (Oral)   Resp 14   Wt 157 lb 13.6 oz (71.6 kg)   SpO2 95%   BMI 24.00 kg/m²   Temp (24hrs), Av.8 °F (36.6 °C), Min:96.9 °F (36.1 °C), Max:98.6 °F (37 °C)       Intake/Output Summary (Last 24 hours) at 2025 1334  Last data filed at 2025 1245  Gross per 24 hour   Intake 370 ml   Output 1400 ml   Net -1030 ml     Last 3 Weights   25 0500 157 lb 13.6 oz (71.6 kg)   25 0528 156 lb 12 oz (71.1 kg)   25 1717 166 lb 0.1 oz (75.3 kg)   25  2341 159 lb (72.1 kg)   04/23/25 1443 159 lb (72.1 kg)   06/13/24 1202 172 lb (78 kg)   12/27/22 1515 167 lb (75.8 kg)   05/21/22 1136 171 lb (77.6 kg)     General: Alert and oriented in no apparent distress.  HEENT: No scleral icterus, MMM, [place]  Neck: Supple, no NICOLE or thyromegaly  Cardiac: Regular rate and rhythm, S1, S2 normal, no murmur or rub  Lungs: Clear without wheezes, rales, rhonchi.    Abdomen: Soft, non-tender. + bowel sounds, no palpable organomegaly  Extremities: Without clubbing, cyanosis or edema.  Neurologic: moving all extremities  Skin: Warm and dry, no rashes      Laboratory Data:  Lab Results   Component Value Date    CREATSERUM 0.78 05/01/2025    CREATSERUM 0.78 05/01/2025    BUN 14 05/01/2025    BUN 14 05/01/2025     05/01/2025     05/01/2025    K 4.5 05/01/2025    K 4.5 05/01/2025    CL 91 05/01/2025    CL 91 05/01/2025    CO2 27.0 05/01/2025    CO2 27.0 05/01/2025     05/01/2025     05/01/2025    CA 9.2 05/01/2025    CA 9.2 05/01/2025    ALB 4.1 05/01/2025    ALKPHO 135 05/01/2025    BILT 1.1 05/01/2025    TP 6.4 05/01/2025    AST 39 05/01/2025    ALT 46 05/01/2025    TSH 3.566 05/01/2025    MG 1.9 05/01/2025    PHOS 4.2 05/01/2025       Glucose (milligrams per deciliter)   Date Value   05/11/2015 96   07/21/2014 72   05/08/2014 92     BUN (mg/dL)   Date Value   05/01/2025 14   05/01/2025 14   04/30/2025 11   08/08/2019 15   07/30/2018 20   12/22/2017 15     Blood Urea Nitrogen (mg/dL)   Date Value   07/30/2018 20   12/23/2017 12     Creatinine, Serum (milligrams per deciliter)   Date Value   05/11/2015 0.97   07/21/2014 1.02   05/08/2014 1.07     Creatinine (mg/dL)   Date Value   05/01/2025 0.78   05/01/2025 0.78   04/30/2025 0.69 (L)   07/30/2018 1.22   12/23/2017 1.03   06/26/2017 1.13       No results found for: \"MICROALBCREA\"    Recent Labs   Lab 04/29/25  1129 04/30/25  0502   WBC 7.8 5.6   HGB 9.2* 10.1*   MCV 90.3 87.6   .0 202.0   INR 1.21*  --         Recent Labs   Lab 04/25/25  1451 04/29/25  1129 04/30/25  0502 05/01/25  0517   NA  --  123* 128* 125*  125*   K  --  4.0 4.0 4.5  4.5   CL  --  92* 92* 91*  91*   CO2  --  28.0 26.0 27.0  27.0   BUN  --  11 11 14  14   CREATSERUM 0.73 0.65* 0.69* 0.78  0.78   CA  --  9.8 9.9 9.2  9.2   MG  --   --   --  1.9   PHOS  --   --   --  4.2   GLU  --  102* 89 108*  108*       Recent Labs   Lab 04/29/25  1129 04/30/25  0502 05/01/25  0517   ALT 60* 51* 46   AST 42* 39* 39*   ALB 4.3 4.2 4.1       No results for input(s): \"PGLU\" in the last 168 hours.        Imaging:  Cxr reviewed  CTA chest noted    Impression/Plan:    #1.  Hyponatremia- has been longstanding and may have underlying SIADH.  Does not appear to have volume overload on exam and has not been drinking water excessively.  Not on any meds typically associated with hypona such as SSRI or hydrochlorothiazide.  TSH normal.  Check urine osm/na.  Furosemide held.  Will dose with tolvaptan x 1.  Follow    #2.  SOB/hypoxia- pt stable currently but cont to have SOB.  L and RHC noted.  Cards following      Thank you for allowing me to participate in the care of your patient. Please do not hesitate to call with any questions or concerns.       Jamar Erazo MD  5/1/2025  1:34 PM         [1]   Past Medical History:   Chronic coronary artery disease    Dysfunction of eustachian tube    Gastroesophageal reflux disease    Hyperlipidemia    Hypertension    Hypogonadism in male    Description: Per Dr. Byrne     Hypothyroidism    Non-Hodgkin's lymphoma (HCC)    Osteoarthritis   [2]   Past Surgical History:  Procedure Laterality Date    Bowel resection      Cath angio  5/1/2025    Colectomy      Hernia surgery      Rotator cuff repair Right     Tonsillectomy     [3]   Family History  Problem Relation Age of Onset    Alcohol and Other Disorders Associated Other     High Blood Pressure Other     Diabetes Other     Heart Disease Other    [4]   Allergies  Allergen  Reactions    Celebrex  [Celecoxib]    [5]   Current Facility-Administered Medications:     amLODIPine (Norvasc) tab 5 mg, 5 mg, Oral, Daily    acetaminophen (Tylenol Extra Strength) tab 1,000 mg, 1,000 mg, Oral, TID & HS    methocarbamol (Robaxin) tab 500 mg, 500 mg, Oral, TID    lidocaine-menthol 4-1 % patch 1 patch, 1 patch, Transdermal, Daily    hydrALAzine (Apresoline) 20 mg/mL injection 10 mg, 10 mg, Intravenous, Q6H PRN    aspirin DR tab 81 mg, 81 mg, Oral, QOD    atenolol (Tenormin) tab 25 mg, 25 mg, Oral, Nightly    atorvastatin (Lipitor) tab 10 mg, 10 mg, Oral, Nightly    levothyroxine (Synthroid) tab 125 mcg, 125 mcg, Oral, Before breakfast    pantoprazole (Protonix) DR tab 40 mg, 40 mg, Oral, BID    tamsulosin (Flomax) cap 0.4 mg, 0.4 mg, Oral, Daily    melatonin tab 3 mg, 3 mg, Oral, Nightly PRN    guaiFENesin ER (Mucinex) 12 hr tab 600 mg, 600 mg, Oral, BID PRN    benzonatate (Tessalon) cap 200 mg, 200 mg, Oral, TID PRN    glycerin-hypromellose- (Artificial Tears) 0.2-0.2-1 % ophthalmic solution 1 drop, 1 drop, Both Eyes, QID PRN    sodium chloride (Saline Mist) 0.65 % nasal solution 1 spray, 1 spray, Each Nare, Q3H PRN    enoxaparin (Lovenox) 40 MG/0.4ML SUBQ injection 40 mg, 40 mg, Subcutaneous, Daily    ondansetron (Zofran) 4 MG/2ML injection 4 mg, 4 mg, Intravenous, Q6H PRN    metoclopramide (Reglan) 5 mg/mL injection 10 mg, 10 mg, Intravenous, Q8H PRN    polyethylene glycol (PEG 3350) (Miralax) 17 g oral packet 17 g, 17 g, Oral, Daily PRN    sennosides (Senokot) tab 17.2 mg, 17.2 mg, Oral, Nightly PRN    bisacodyl (Dulcolax) 10 MG rectal suppository 10 mg, 10 mg, Rectal, Daily PRN    fleet enema (Fleet) rectal enema 133 mL, 1 enema, Rectal, Once PRN  [6]   No current outpatient medications on file.

## 2025-05-02 NOTE — PROGRESS NOTES
[facility]   part of [facility]     Hospitalist Progress Note     James Roberts Patient Status:  Observation    10/17/1945 MRN [id number]   Location [facility] 8NE-A Attending [place], Riley Saleem, *   Hosp Day # 2 PCP PHYSICIAN NONSTAFF     Chief Complaint: SOB     Subjective:      - Pt feeling well, slightly more confused and foggy today, states he feels this is related to muscle relaxant.    - Notes lidocaine patch had improved symptoms of neck pain; notes that pain is worst at night when lying flat   - Denies any current SOB, satting 93% on RA    - Denies other f/c, cp, abd pain, n/v    Objective:    Review of Systems:   A comprehensive review of systems was completed; pertinent positive and negatives stated in subjective.    Vital signs:  Temp:  [96.9 °F (36.1 °C)-98.6 °F (37 °C)] 97.8 °F (36.6 °C)  Pulse:  [] 100  Resp:  [10-28] 16  BP: (106-145)/(46-96) 114/61  SpO2:  [90 %-100 %] 98 %    Physical Exam:    General: No acute distress  Respiratory: no wheezes, no rhonchi  Cardiovascular: S1, S2, regular rate and rhythm  Abdomen: Soft, Non-tender, non-distended, positive bowel sounds. Right groin with dressing c/d/I, no swelling, ecchymosis or ttp   Neuro: No new focal deficits.   Extremities: no edema    Diagnostic Data:    Labs:  Recent Labs   Lab 25  1129 25  0502 25  0801   WBC 7.8 5.6 4.8   HGB 9.2* 10.1* 9.7*   MCV 90.3 87.6 88.1   .0 202.0 260.0   INR 1.21*  --  1.14       Recent Labs   Lab 25  0502 25  0517 25  0801   GLU 89 108*  108* 95   BUN 11 14  14 15   CREATSERUM 0.69* 0.78  0.78 0.80   CA 9.9 9.2  9.2 9.3   ALB 4.2 4.1 3.9   * 125*  125* 132*   K 4.0 4.5  4.5 4.3   CL 92* 91*  91* 98   CO2 26.0 27.0  27.0 28.0   ALKPHO 160* 135* 130*   AST 39* 39* 27   ALT 51* 46 42   BILT 1.2* 1.1 1.0   TP 6.6 6.4 6.3       Estimated Creatinine Clearance: 73.6 mL/min (based on SCr of 0.8 mg/dL).    Recent Labs   Lab  04/29/25  1129   TROPHS 17       Recent Labs   Lab 04/29/25  1129 05/02/25  0801   PTP 15.4* 14.7   INR 1.21* 1.14                    Microbiology    No results found for this visit on 04/29/25.      Imaging: Reviewed in Epic.    Medications: Scheduled Medications[1]    Assessment & Plan:      #Acute hypoxic respiratory failure, resolved   #Possible new onset CHF, ruled out   - Chest x-ray reviewed and shows trace bilateral pleural effusions  - CT chest from 4/25 reviewed and shows bilateral pleural effusions right greater than left  - Repeat CT chest reviewed; shows worsening bilateral pleural effusions   - Pulm consulted, plan for thoracentesis today   - oncology consulted  - TTE with mild-mod pHTN ; preserved EF, G1DD   - s/p LHC with findings of no significant obstructive disease and normal LVEDP   - Cardiology consulted     #Elevated D-dimer  - CT chest as above without PE    #Transaminitis   - likely 2/2 hepatic congestion, monitor with diuresis     - CTA A/P without acute biliary/hepatic findings      # Neck strain   - CT c-spine without acute findings, likely muscle strain from story   - trial of multimodal pain control - will decrease robaxin dose and frequency, can trial norco PRN nightly      #Hyponatremia, stable    - UA, Urine sodium/osms ordered   - Nephrology consulted    # Malignant fibrous histiocytoma  # Non-Hodgkin's lymphoma   - oncology on consult; has been lost to f/u previously     #CAD  - Aspirin, statin     #Hypertension  - Amlodipine, atenolol     #Hyperlipidemia  - Statin     #Hypothyroidism  - Levothyroxine     Riley Reaves MD    Supplementary Documentation:     Quality:  DVT Mechanical Prophylaxis:   SCDs,    DVT Pharmacologic Prophylaxis   Medication    enoxaparin (Lovenox) 40 MG/0.4ML SUBQ injection 40 mg         DVT Pharmacologic prophylaxis: Aspirin 81 mg      Code Status: Not on file  Lewis: No urinary catheter in place  Lewis Duration (in days):   Central line:    JOSE:      The 21st Century Cures Act makes medical notes like these available to patients in the interest of transparency. Please be advised this is a medical document. Medical documents are intended to carry relevant information, facts as evident, and the clinical opinion of the practitioner. The medical note is intended as peer to peer communication and may appear blunt or direct. It is written in medical language and may contain abbreviations or verbiage that are unfamiliar.                       [1]    methocarbamol  250 mg Oral BID    lidocaine-menthol  1 patch Transdermal Once    amLODIPine  5 mg Oral Daily    acetaminophen  1,000 mg Oral TID & HS    lidocaine-menthol  1 patch Transdermal Daily    aspirin  81 mg Oral QOD    atenolol  25 mg Oral Nightly    atorvastatin  10 mg Oral Nightly    levothyroxine  125 mcg Oral Before breakfast    pantoprazole  40 mg Oral BID    tamsulosin  0.4 mg Oral Daily    enoxaparin  40 mg Subcutaneous Daily

## 2025-05-02 NOTE — PROGRESS NOTES
05/02/25 1600   Mobility   Activity Ambulate in boc[place]   Assistive Device None   Distance (ft) 200   Ambulation Response Tolerated well   O2 walk? Yes   SPO2% on Room Air at Rest 100   SPO2% Ambulation on Room Air 100     Pt tolerated ambulation in [place] well, denied shortness of breath. Maintained SPO2 of 100% the entire walk.

## 2025-05-02 NOTE — PROGRESS NOTES
[facility]  Progress Note    James Roberts Patient Status:  Inpatient    10/17/1945 MRN [id number]   Location [facility] 8NE-A Attending Riley Reaves, *   Hosp Day # 2 PCP PHYSICIAN NONSTAFF     Subjective:   No chest pain.  No shortness of breath, however has been inactive in the hospital setting.  No complaints at RFA cath site.  No focal cardiovascular complaints reported.    Objective:  Vitals:    25 0200 25 0445 25 0456 25 0527   BP:  125/46     BP Location:  Left arm     Pulse: 72 77 96 76   Resp: 13 18 (!) 28 11   Temp:  98.6 °F (37 °C)     TempSrc:  Oral     SpO2: 90% 93%     Weight:    154 lb 15.7 oz (70.3 kg)       Temp (24hrs), Av.8 °F (36.6 °C), Min:96.9 °F (36.1 °C), Max:98.6 °F (37 °C)      Medications:   Scheduled:   Scheduled Medications[1]    Continuous Infusion:   Medication Infusions[2]    PRN Medications:   PRN Medications[3]    Intake/Output:     Intake/Output Summary (Last 24 hours) at 2025 0831  Last data filed at 2025 0456  Gross per 24 hour   Intake 400 ml   Output 2150 ml   Net -1750 ml       Wt Readings from Last 3 Encounters:   25 154 lb 15.7 oz (70.3 kg)   25 159 lb (72.1 kg)   24 172 lb (78 kg)       Allergies:  Allergies[4]    Physical Exam:   General:  Well-developed / Well-nourished.  No acute distress.  HEENT:  Normocephalic.  Atraumatic.  No icterus.  Neck:  There is no jugular venous distention.   Cardiovascular:  Cardiovascular examination demonstrates a regular rate and rhythm.  There is normal S1, S2.  There is no S3 or S4.  There are no murmurs, rubs, or gallops.  No click is appreciated.  PMI is nondisplaced with a normal apical impulse.    Pulmonary:  Lungs are clear to auscultation bilaterally.  There are no focal rales, rhonchi, or wheezes.  Good air movement is noted throughout both lung fields.   Abdomen:  The abdomen is soft, non-distended, and non-tender.  Bowel sounds are present and  normoactive.  No organomegaly is appreciated.  Extremities:  Extremities do not demonstrate any evidence of peripheral edema.   No cyanosis or clubbing of the digits is appreciated.  No hematoma R FA.    Neurologic:  Alert and oriented.  Normal affect.  Integument:  No visible rashes are appreciated.      Laboratory/Data:   Recent Labs   Lab 04/29/25  1129 04/30/25  0502 05/01/25  0517   * 89 108*  108*   BUN 11 11 14  14   CREATSERUM 0.65* 0.69* 0.78  0.78   CA 9.8 9.9 9.2  9.2   ALB 4.3 4.2 4.1   * 128* 125*  125*   K 4.0 4.0 4.5  4.5   CL 92* 92* 91*  91*   CO2 28.0 26.0 27.0  27.0   ALKPHO 177* 160* 135*   AST 42* 39* 39*   ALT 60* 51* 46   BILT 1.5* 1.2* 1.1   TP 6.6 6.6 6.4       Recent Labs   Lab 04/29/25  1129 04/30/25  0502   RBC 3.08* 3.38*   HGB 9.2* 10.1*   HCT 27.8* 29.6*   MCV 90.3 87.6   MCH 29.9 29.9   MCHC 33.1 34.1   RDW 18.6 18.5   NEPRELIM 5.71 3.42   WBC 7.8 5.6   .0 202.0       Recent Labs   Lab 04/29/25  1129   PTP 15.4*   INR 1.21*       No results for input(s): \"TROP\", \"CK\" in the last 168 hours.      Tele:     Diagnostics:    Echo:   Conclusions:     1. Left ventricle: The cavity size was normal. Wall thickness was normal.      Systolic function was normal. The estimated ejection fraction was 55-60%,      by visual assessment. No diagnostic evidence for regional wall motion      abnormalities. Doppler parameters are consistent with abnormal left      ventricular relaxation - grade 1 diastolic dysfunction.   2. Right ventricle: The cavity size was normal. Systolic function was      normal. Systolic pressure was mildly increased.   3. Aortic valve: The valve was trileaflet. The leaflets were mildly      calcified.   4. Mitral valve: There was mild regurgitation.   5. Tricuspid valve: There was mild regurgitation.   6. Pulmonary arteries: Systolic pressure was mildly increased, in the range      of 40mm Hg to 45mm Hg. Estimated pulmonary artery diastolic pressure  was      15mm Hg.     Cath 5/1/25:   IMPRESSION:    1.  Left heart catheterization: LVEDP 10 mmHg, no aortic stenosis.  LVEF 55-60% without focal wall motion abnormalities. No signfiicant mitral regurgitation  2.  Coronary angiography:  co-dominant system  - LM: no significant angiographic disease  - LAD: mild proximal disease  - LCx: mild proximal disease, 50% mid stenosis  - RCA: mild diffuse disease    Assessment:    HTN   HL   SOB  -HFpEF  -Component of COPD  -Pleural effusion  4.    Hypothyroidism  5.    PVD                -S/P LE percutaneous intervention in the past                -Claudication is noted  6.   Smoking Hx  7.   ?Component of COPD  8.   Hyponatremia / SIADH  9.   Anemia  10.  Elevated LFTs  11.  Elevated D-Dimer                -CT negative for PE  12.  Pleural effusion on CT  13.  Non-obstructive CAD on cath 5/1/25  14.  H/O NHL    Plan:   Renal following - Lasix on hold.  Tolvaptan given.  Consider reintroduction of low dose diuretic pending evaluation of pleural fluid   ASA   BB   Statin   Norvasc   Pulmonary / Oncology evaluations ongoing.   Plan diagnostic / therapeutic thoracentesis today   Increase activity after thoracentesis       Nathaniel Jeffries MD  5/12/2025           [1]    lidocaine-menthol  1 patch Transdermal Once    amLODIPine  5 mg Oral Daily    acetaminophen  1,000 mg Oral TID & HS    methocarbamol  500 mg Oral TID    lidocaine-menthol  1 patch Transdermal Daily    aspirin  81 mg Oral QOD    atenolol  25 mg Oral Nightly    atorvastatin  10 mg Oral Nightly    levothyroxine  125 mcg Oral Before breakfast    pantoprazole  40 mg Oral BID    tamsulosin  0.4 mg Oral Daily    enoxaparin  40 mg Subcutaneous Daily   [2] [3]   melatonin    guaiFENesin ER    benzonatate    glycerin-hypromellose-    sodium chloride    ondansetron    metoclopramide    polyethylene glycol (PEG 3350)    sennosides    bisacodyl    fleet enema  [4]   Allergies  Allergen Reactions    Celebrex  [Celecoxib]

## 2025-05-02 NOTE — PROCEDURES
[facility]   part of [facility]  Procedure Note    James Roberts Patient Status:  Inpatient    10/17/1945 MRN [id number]   Location [facility] 8NE-A Attending Riley Reaves, *   Hosp Day # 2 PCP PHYSICIAN NONSTAFF     Procedure: US guided right thoracentesis    Pre-Procedure Diagnosis:  right pleural effusion    Post-Procedure Diagnosis: same    Anesthesia:  Local    Findings:  serous fluid    Specimens: 270 ml    Blood Loss:  <5 ml    Tourniquet Time: n/a  Complications:  None  Drains:  removed    Secondary Diagnosis:  none    Manjeet Malagon MD  2025

## 2025-05-02 NOTE — PLAN OF CARE
Patient is alert & oriented x4. Pt ambulates w/ SBA. Breath sounds are diminished bilateral. Pt reports occasional dyspnea on exertion. On room air. Normal sinus rhythm. Neck pain reported. Skin dry and intact. Bed in low position and call light within reach. Reviewed plan of care and patient verbalizes understanding.      POC: Right sided Thora-NPO after midnight.       Problem: Patient/Family Goals  Goal: Patient/Family Long Term Goal  Description: Patient's Long Term Goal: stay out of the hospital    Interventions:  - take meds as prescribed  - attend follow up appointments  - See additional Care Plan goals for specific interventions  Outcome: Progressing  Goal: Patient/Family Short Term Goal  Description: Patient's Short Term Goal: go home  No pain 5-1    Interventions:   - meds, labs, tele monitoring  - ECHO  - consults to see  - PRN pain meds, hot/cold packs, tell RN if in pain  - See additional Care Plan goals for specific interventions  Outcome: Progressing     Problem: CARDIOVASCULAR - ADULT  Goal: Maintains optimal cardiac output and hemodynamic stability  Description: INTERVENTIONS:- Monitor vital signs, rhythm, and trends- Monitor for bleeding, hypotension and signs of decreased cardiac output- Evaluate effectiveness of vasoactive medications to optimize hemodynamic stability- Monitor arterial and/or venous puncture sites for bleeding and/or hematoma- Assess quality of pulses, skin color and temperature- Assess for signs of decreased coronary artery perfusion - ex. Angina- Evaluate fluid balance, assess for edema, trend weights  Outcome: Progressing  Goal: Absence of cardiac arrhythmias or at baseline  Description: INTERVENTIONS:- Continuous cardiac monitoring, monitor vital signs, obtain 12 lead EKG if indicated- Evaluate effectiveness of antiarrhythmic and heart rate control medications as ordered- Initiate emergency measures for life threatening arrhythmias- Monitor electrolytes and administer  replacement therapy as ordered  Outcome: Progressing     Problem: PAIN - ADULT  Goal: Verbalizes/displays adequate comfort level or patient's stated pain goal  Description: INTERVENTIONS:- Encourage pt to monitor pain and request assistance- Assess pain using appropriate pain scale- Administer analgesics based on type and severity of pain and evaluate response- Implement non-pharmacological measures as appropriate and evaluate response- Consider cultural and social influences on pain and pain management- Manage/alleviate anxiety- Utilize distraction and/or relaxation techniques- Monitor for opioid side effects- Notify MD/LIP if interventions unsuccessful or patient reports new pain- Anticipate increased pain with activity and pre-medicate as appropriate  Outcome: Progressing

## 2025-05-02 NOTE — PROGRESS NOTES
[facility]   part of [facility]     Nephrology Progress Note    James Roberts Patient Status:  Inpatient    10/17/1945 MRN [id number]   Location [facility] 8NE-A Attending Jean Paul, Riley Saleem, *   Hosp Day # 2 PCP PHYSICIAN NONSTAFF       SUBJECTIVE:  Stable this AM s/p thora        Physical Exam:   /61 (BP Location: Right arm)   Pulse 100   Temp 97.8 °F (36.6 °C) (Oral)   Resp 16   Wt 154 lb 15.7 oz (70.3 kg)   SpO2 98%   BMI 23.57 kg/m²   Temp (24hrs), Av.8 °F (36.6 °C), Min:96.9 °F (36.1 °C), Max:98.6 °F (37 °C)       Intake/Output Summary (Last 24 hours) at 2025 1119  Last data filed at 2025 0456  Gross per 24 hour   Intake 400 ml   Output 2150 ml   Net -1750 ml     Last 3 Weights   25 0527 154 lb 15.7 oz (70.3 kg)   25 0500 157 lb 13.6 oz (71.6 kg)   25 0528 156 lb 12 oz (71.1 kg)   25 1717 166 lb 0.1 oz (75.3 kg)   25 2341 159 lb (72.1 kg)   25 1443 159 lb (72.1 kg)   24 1202 172 lb (78 kg)   22 1515 167 lb (75.8 kg)   22 1136 171 lb (77.6 kg)     General: Alert and oriented in no apparent distress.  HEENT: No scleral icterus, MMM  Neck: Supple, no NICOLE or thyromegaly  Cardiac: Regular rate and rhythm, S1, S2 normal, no murmur or rub  Lungs: Clear without wheezes, rales, rhonchi.    Abdomen: Soft, non-tender. + bowel sounds, no palpable organomegaly  Extremities: Without clubbing, cyanosis or edema.  Neurologic: moving all extremities  Skin: Warm and dry, no rash        Labs:     Recent Labs   Lab 25  1129 25  0502 25  0801   WBC 7.8 5.6 4.8   HGB 9.2* 10.1* 9.7*   MCV 90.3 87.6 88.1   .0 202.0 260.0   INR 1.21*  --  1.14       Recent Labs   Lab 25  1451 25  1129 25  0502 25  0517 25  0801   NA  --  123* 128* 125*  125* 132*   K  --  4.0 4.0 4.5  4.5 4.3   CL  --  92* 92* 91*  91* 98   CO2  --  28.0 26.0 27.0  27.0 28.0   BUN  --  11 11 14  14 15    CREATSERUM 0.73 0.65* 0.69* 0.78  0.78 0.80   CA  --  9.8 9.9 9.2  9.2 9.3   MG  --   --   --  1.9 2.1   PHOS  --   --   --  4.2 3.9   GLU  --  102* 89 108*  108* 95       Recent Labs   Lab 04/29/25  1129 04/30/25  0502 05/01/25  0517 05/02/25  0801   ALT 60* 51* 46 42   AST 42* 39* 39* 27   ALB 4.3 4.2 4.1 3.9   LDH  --   --   --  164       Uosm 434  Karie 77      Meds:   Current Hospital Medications[1]      Impression/Plan:      #1.  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  Does not appear to have volume overload on exam and has not been drinking water excessively.  Not on any meds typically associated with hypona such as SSRI or hydrochlorothiazide.  TSH normal.  Ujrine osm/na suggestive of SIADH.  NA better with tolvaptan x 1 yest.  No need for sodium restricted diet.  Consider addition of low-dose furosemide (10 mg daily) to impair urinary concentrating ability.  Modest po fluid restriction     #2.  SOB/hypoxia- pt stable currently but cont to have SOB.  L and RHC noted.  Cards following    Questions/concerns were discussed with patient and/or family by bedside.          Jamar Erazo MD  5/2/2025  11:19 AM         [1]   Current Facility-Administered Medications   Medication Dose Route Frequency    methocarbamol (Robaxin) partial tab 250 mg  250 mg Oral BID    HYDROcodone-acetaminophen (Norco) 5-325 MG per tab 1 tablet  1 tablet Oral Q6H PRN    lidocaine-menthol 4-1 % patch 1 patch  1 patch Transdermal Once    amLODIPine (Norvasc) tab 5 mg  5 mg Oral Daily    acetaminophen (Tylenol Extra Strength) tab 1,000 mg  1,000 mg Oral TID & HS    lidocaine-menthol 4-1 % patch 1 patch  1 patch Transdermal Daily    aspirin DR tab 81 mg  81 mg Oral QOD    atenolol (Tenormin) tab 25 mg  25 mg Oral Nightly    atorvastatin (Lipitor) tab 10 mg  10 mg Oral Nightly    levothyroxine (Synthroid) tab 125 mcg  125 mcg Oral Before breakfast    pantoprazole (Protonix) DR tab 40 mg  40 mg Oral BID     tamsulosin (Flomax) cap 0.4 mg  0.4 mg Oral Daily    melatonin tab 3 mg  3 mg Oral Nightly PRN    guaiFENesin ER (Mucinex) 12 hr tab 600 mg  600 mg Oral BID PRN    benzonatate (Tessalon) cap 200 mg  200 mg Oral TID PRN    glycerin-hypromellose- (Artificial Tears) 0.2-0.2-1 % ophthalmic solution 1 drop  1 drop Both Eyes QID PRN    sodium chloride (Saline Mist) 0.65 % nasal solution 1 spray  1 spray Each Nare Q3H PRN    enoxaparin (Lovenox) 40 MG/0.4ML SUBQ injection 40 mg  40 mg Subcutaneous Daily    ondansetron (Zofran) 4 MG/2ML injection 4 mg  4 mg Intravenous Q6H PRN    metoclopramide (Reglan) 5 mg/mL injection 10 mg  10 mg Intravenous Q8H PRN    polyethylene glycol (PEG 3350) (Miralax) 17 g oral packet 17 g  17 g Oral Daily PRN    sennosides (Senokot) tab 17.2 mg  17.2 mg Oral Nightly PRN    bisacodyl (Dulcolax) 10 MG rectal suppository 10 mg  10 mg Rectal Daily PRN    fleet enema (Fleet) rectal enema 133 mL  1 enema Rectal Once PRN

## 2025-05-02 NOTE — PLAN OF CARE
Pt denies c/o pain, malaise or cardiac symptoms. A&O x 4. Lungs diminished at bases bilaterally with equal expansion, crackles at the LLL upon auscultation on room air. Pt NSR on monitor with regular rates. Abdomen soft, nontender with active bowel sounds in all four quadrants, continent to b&b. Pt updated with plan of care.    Problem: Patient/Family Goals  Goal: Patient/Family Long Term Goal  Description: Patient's Long Term Goal: stay out of the hospital    Interventions:  - take meds as prescribed  - attend follow up appointments  - See additional Care Plan goals for specific interventions  Outcome: Progressing  Goal: Patient/Family Short Term Goal  Description: Patient's Short Term Goal: go home  No pain 5-1    Interventions:   - meds, labs, tele monitoring  - ECHO  - consults to see  - PRN pain meds, hot/cold packs, tell RN if in pain  - See additional Care Plan goals for specific interventions  Outcome: Progressing     Problem: CARDIOVASCULAR - ADULT  Goal: Maintains optimal cardiac output and hemodynamic stability  Description: INTERVENTIONS:- Monitor vital signs, rhythm, and trends- Monitor for bleeding, hypotension and signs of decreased cardiac output- Evaluate effectiveness of vasoactive medications to optimize hemodynamic stability- Monitor arterial and/or venous puncture sites for bleeding and/or hematoma- Assess quality of pulses, skin color and temperature- Assess for signs of decreased coronary artery perfusion - ex. Angina- Evaluate fluid balance, assess for edema, trend weights  Outcome: Progressing  Goal: Absence of cardiac arrhythmias or at baseline  Description: INTERVENTIONS:- Continuous cardiac monitoring, monitor vital signs, obtain 12 lead EKG if indicated- Evaluate effectiveness of antiarrhythmic and heart rate control medications as ordered- Initiate emergency measures for life threatening arrhythmias- Monitor electrolytes and administer replacement therapy as ordered  Outcome:  Progressing     Problem: PAIN - ADULT  Goal: Verbalizes/displays adequate comfort level or patient's stated pain goal  Description: INTERVENTIONS:- Encourage pt to monitor pain and request assistance- Assess pain using appropriate pain scale- Administer analgesics based on type and severity of pain and evaluate response- Implement non-pharmacological measures as appropriate and evaluate response- Consider cultural and social influences on pain and pain management- Manage/alleviate anxiety- Utilize distraction and/or relaxation techniques- Monitor for opioid side effects- Notify MD/LIP if interventions unsuccessful or patient reports new pain- Anticipate increased pain with activity and pre-medicate as appropriate  Outcome: Progressing

## 2025-05-02 NOTE — IMAGING NOTE
Call placed to MEENU Doherty regarding US Thora Right. The following instructions were given:  Keep patient NPO.  STAT PT/INR.  Pt to be consented in ultrasound.  Call radiology nursing with questions at extension [phone]

## 2025-05-02 NOTE — PROGRESS NOTES
[facility]  Progress Note    James Roberts Patient Status:  Inpatient    10/17/1945 MRN [id number]   Location [facility] 8NE-A Attending Riley Reaves, *   Hosp Day # 2 PCP PHYSICIAN NONSTAFF     Subjective:  James Roberts is a(n) 79 year old male remains afeb   Neck pain about the same  Recent decrease in muscle relaxants related to sleepiness  Denies any shortness of breath at rest or with minimal activity      Objective:  /61 (BP Location: Right arm)   Pulse 100   Temp 97.8 °F (36.6 °C) (Oral)   Resp 16   Wt 154 lb 15.7 oz (70.3 kg)   SpO2 98%   BMI 23.57 kg/m² remains on room air      Temp (24hrs), Av.8 °F (36.6 °C), Min:96.9 °F (36.1 °C), Max:98.6 °F (37 °C)      Intake/Output:    Intake/Output Summary (Last 24 hours) at 2025 0907  Last data filed at 2025 0456  Gross per 24 hour   Intake 400 ml   Output 2150 ml   Net -1750 ml       Physical Exam:alert   Head: Normocephalic, without obvious abnormality, atraumatic.               Eyes/Nose: No obvious lesions  small posterior pharyngeal area               Throat: Lips, mucosa, and tongue normal.  No thrush noted.               Neck: trachea midline, no adenopathy, no thyromegaly.  Tense muscles no obvious JVD on the right with some postop changes-questionable JVD on the left               Lungs: Bilateral dullness decent breath sounds no auscultated wheeze or rales               Chest wall: Postop changes with mass lesion nontender right upper chest area notes               Heart: Heart tones are mildly diminished               Abdomen: soft, non-distended, no masses, no guarding, no                       Rebound.  No obvious hepatosplenomegaly or ascites               Extremity: No significant edema no clubbing               Skin: No rashes or lesions.               Neurological: Alert, interactive, no focal deficits     Lab Data Review:  Recent Labs     25  1129 25  0502 25  0801   WBC 7.8 5.6  4.8   HGB 9.2* 10.1* 9.7*   .0 202.0 260.0     Recent Labs     04/29/25  1129 04/30/25  0502 05/01/25  0517 05/02/25  0801   * 128* 125*  125* 132*   K 4.0 4.0 4.5  4.5 4.3   CL 92* 92* 91*  91* 98   CO2 28.0 26.0 27.0  27.0 28.0   BUN 11 11 14  14 15   CREATSERUM 0.65* 0.69* 0.78  0.78 0.80     Recent Labs   Lab 04/29/25  1129 05/02/25  0801   PTP 15.4* 14.7   INR 1.21* 1.14   PTT 32.7  --        Cultures: Pending    Radiology:  No results found.  Reviewed      Medications reviewed     Assessment and Plan:   Problem List[1]    Assessment:  Dyspnea with mild hypoxia-etiology to be determined however evidence of fairly rapidly advancing bilateral pleural effusions with the etiology to be determined.  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 Jeffries plan to hold on right heart cath currently pending results  May need follow-up PET scan  Further recommendations pending  Discharge today with close pulmonary follow-up-patient reports many social stresses         CC     Eboni Rhoades MD  5/2/2025  9:07 AM         [1]   Patient Active Problem List  Diagnosis    Chronic coronary artery disease    Gastroesophageal reflux disease    Hypogonadism in male    Non-Hodgkin's lymphoma (HCC)    Osteoarthritis    Mixed hyperlipidemia    Hiatal hernia    Enlarged prostate    Lung nodule    Essential hypertension    Acquired hypothyroidism    Screening for colon cancer    PAOD (peripheral arterial occlusive disease)    Abdominal pain, generalized    Acute pancreatitis, unspecified complication status, unspecified pancreatitis type (HCC)     Hyponatremia    Dehydration    Constipation, unspecified constipation type    Anemia    Hyperglycemia    Acute intractable tension-type headache    Neck pain    Anemia, unspecified type    Unilateral inguinal hernia without obstruction or gangrene, recurrence not specified    Cervical radicular pain    Acute on chronic congestive heart failure, unspecified heart failure type (HCC)    Acute respiratory failure with hypoxia (HCC)    Positive D dimer    New onset of congestive heart failure (HCC)    Hypoxia    Pleural effusion, bilateral    Acute pain of both shoulders    Acute cough    SOB (shortness of breath)    Bilateral lower extremity edema    Situational anxiety    Hallucinations

## 2025-05-03 NOTE — PROGRESS NOTES
NURSING DISCHARGE NOTE    Discharged Home via Wheelchair.  Accompanied by Family member and RN  Belongings Taken by patient/family.      Pt cleared for dc by all providers. Iv and tele removed. All discharge instructions given to patient who verbalized understanding.

## 2025-05-03 NOTE — PROGRESS NOTES
[facility]     James Roberts Patient Status:  Inpatient    10/17/1945 MRN [id number]   Location [facility] 8NE-A Attending No att. providers found   Hosp Day # 2 PCP PHYSICIAN NONSTAFF     Date of Admission: 2025  Date of Discharge: 2025  Discharge Disposition: Home or Self Care    Admitting Diagnosis:   Hypoxia [R09.02]  Acute on chronic congestive heart failure, unspecified heart failure type (HCC) [I50.9]    Hospital Discharge Diagnoses:   Acute hypoxic respiratory failure  Bilateral pleural effusions  CHF, ruled out  Transaminitis  Neck strain  Hyponatremia  Malignant fibrous histiocytoma  Non-Hodgkin's lymphoma  CAD  Hypertension  Hyperlipidemia  Hypothyroidism    Lace+ Score: 73  59-90 High Risk  29-58 Medium Risk  0-28   Low Risk.    TCM Follow-Up Recommendation:  LACE > 58: High Risk of readmission after discharge from the hospital.        Discharge Diagnosis:   Acute hypoxemic respiratory failure    History of Present Illness: James Roberts is a 79 year old male with past medical history of CAD, hypertension, hyperlipidemia, hypothyroidism, non-Hodgkin's lymphoma who presents ED for dyspnea.  Patient was seen in outpatient setting and found to have low pulse ox.  He was placed on 2 L oxygen and sent to ER for further evaluation.  He denies any fevers, chills, nausea, vomiting, abdominal pain, headache.    Brief Synopsis: Patient presented with hypoxia in outpatient clinic, with initial CT imaging showing bilateral pleural effusions.  Pulmonology and cardiology consulted, initially thought to have CHF, started on IV diuretics, underwent left heart cath with findings of no significant obstructive disease and normal LVEDP, reassuring against heart failure.  TTE completed with with mild-mod pHTN ; preserved EF, G1DD.  Underwent IR guided thoracentesis with 250 cc removed from right.  States that he walked the halls and felt immediate symptomatic relief after  thoracentesis.  Patient discharged home with outpatient follow-up with cardiology, pulmonology, oncology all diagnoses discussed with patient and family, they demonstrated understanding and plan of care and risks reviewed and in agreement.     Procedures during hospitalization:   Left heart catheterization  IR right-sided thoracentesis    Incidental or significant findings and recommendations (brief descriptions):  Follow-up with pulmonology for management of bilateral pleural effusions  Oncology follow-up to reestablish care    Lab/Test results pending at Discharge:       Consultants:  Pulmonology, nephrology, cardiology, oncology    Discharge Medication List:     Discharge Medications        START taking these medications        Instructions Prescription details   acetaminophen 500 MG Tabs  Commonly known as: Tylenol Extra Strength      Take 2 tablets (1,000 mg total) by mouth TID & HS for 14 days.   Stop taking on: May 16, 2025  Quantity: 112 tablet  Refills: 0     furosemide 20 MG Tabs  Commonly known as: Lasix      Take 0.5 tablets (10 mg total) by mouth daily.   Stop taking on: June 2, 2025  Quantity: 15 tablet  Refills: 0     HYDROcodone-acetaminophen 5-325 MG Tabs  Commonly known as: Norco      Take 1 tablet by mouth every 8 (eight) hours as needed for Pain.   Stop taking on: May 7, 2025  Quantity: 10 tablet  Refills: 0     lidocaine 5 % Ptch  Commonly known as: Lidoderm      Place 1 patch onto the skin daily.   Stop taking on: June 1, 2025  Quantity: 30 patch  Refills: 0     methocarbamol 500 MG Tabs  Commonly known as: Robaxin      Take 0.5 tablets (250 mg total) by mouth in the morning and 0.5 tablets (250 mg total) before bedtime. Do all this for 14 days.   Stop taking on: May 16, 2025  Quantity: 14 tablet  Refills: 0            CHANGE how you take these medications        Instructions Prescription details   atenolol 25 MG Tabs  Commonly known as: Tenormin  What changed: when to take this      Take 1  tablet (25 mg total) by mouth daily.   Refills: 0            CONTINUE taking these medications        Instructions Prescription details   albuterol 108 (90 Base) MCG/ACT Aers  Commonly known as: Ventolin HFA      Inhale 2 puffs into the lungs every 6 (six) hours as needed for Wheezing.   Refills: 0     amLODIPine 5 MG Tabs  Commonly known as: Norvasc      Take 0.5 tablets (2.5 mg total) by mouth daily.   Refills: 0     aspirin 81 MG Tabs      Take 1 tablet (81 mg total) by mouth every other day.   Refills: 0     atorvastatin 10 MG Tabs  Commonly known as: Lipitor      Take 1 tablet (10 mg total) by mouth nightly.   Refills: 0     DESIRAE C OR      Take 500 mg by mouth in the morning.   Refills: 0     levothyroxine 125 MCG Tabs  Commonly known as: Synthroid      Take 1 tablet (125 mcg total) by mouth before breakfast.   Refills: 0     MULTIVITAMIN OR      Take by mouth in the morning.   Refills: 0     pantoprazole 40 MG Tbec  Commonly known as: Protonix      Take 1 tablet (40 mg total) by mouth in the morning and 1 tablet (40 mg total) before bedtime.   Refills: 0     tamsulosin 0.4 MG Caps  Commonly known as: Flomax      Take 1 capsule (0.4 mg total) by mouth daily.   Refills: 0     triamcinolone 0.025 % Crea  Commonly known as: Kenalog      Apply topically in the morning and before bedtime.   Refills: 0     Vitamin D 50 MCG (2000 UT) Caps      Take 1 capsule (2,000 Units total) by mouth in the morning.   Refills: 0     vitamin E 400 UNITS Caps  Commonly known as: Alpha-E      Take 1 capsule (400 Units total) by mouth in the morning.   Refills: 0            STOP taking these medications      docusate sodium 100 MG Caps  Commonly known as: Colace                  Where to Get Your Medications        These medications were sent to [facility] [address] [place] [address] [phone], [phone]  [address] [place] [id number]      Phone: [phone]   acetaminophen 500 MG Tabs  furosemide 20 MG  Tabs  HYDROcodone-acetaminophen 5-325 MG Tabs  lidocaine 5 % Ptch  methocarbamol 500 MG Tabs         [place]    Follow-up appointment:   Nonstaff, Physician  [address] [facility] [address]    Call in 1 week(s)  For follow-up after hospitilization, Call [phone] for the physician referral line    [facility]  [address]  [facility] [id number] [phone]  Call in 3 day(s)  For follow-up after hospitilization    Naga Riley MD  [address]  [facility]  [place] [address]  [phone]    Call in 1 week(s)  Establish care, For follow-up after hospitilization    Ezio Bosch MD  [address]  [place] [address]  [phone]    Call in 1 week(s)  For follow-up after hospitilization    Eboni Rhoades MD  [address]  [place] [address]  [phone]    Schedule an appointment as soon as possible for a visit in 1 week(s)  For follow-up after hospitilization, Establish care    Nathaniel Jeffries MD  [address]  [place] [address]  [phone]    Schedule an appointment as soon as possible for a visit in 3 week(s)        Vital signs:  Temp:  [98 °F (36.7 °C)] 98 °F (36.7 °C)  Pulse:  [89] 89  Resp:  [16] 16  BP: (101)/(77) 101/77  SpO2:  [100 %] 100 %    Physical Exam:  See exam from day of discharge note  -----------------------------------------------------------------------------------------------  PATIENT DISCHARGE INSTRUCTIONS: See electronic chart    Riley Reaves MD 5/3/2025    Time spent:  33 minutes

## 2025-05-05 NOTE — PROGRESS NOTES
Transitional Care Management   Discharge Date: 25  Contact Date: 2025    Assessment:  TCM Initial Assessment    General:  Assessment completed with: Patient  Patient Subjective: Doing better just tired.  Chief Complaint: Acute on chronic congestive heart failure  Verify patient name and  with patient/ caregiver: Yes    Hospital Stay/Discharge:  Prior to leaving the hospital were your Discharge Instructions reviewed with you?: Yes  Did you receive a copy of your written Discharge Instructions?: Yes  Do you feel better or worse since you left the hospital or emergency department?: Better    Follow - Up Appointment:  Do you have a follow-up appointment?: No    Home Health/DME:  Prior to leaving the hospital was Home Health (HH) arranged for you?: No     Prior to leaving the hospital or emergency department was Durable Medical Equipment (DME), medical supplies, or infusions arranged for you?: No  Are DME/medical supply/infusions needs identified by staff during this assessment?: No     Medications/Diet:       Were you given a different diet per your Discharge Instructions?: No     Questions/Concerns:  Do you have any questions or concerns that have not been discussed?: No         Follow-up Appointments:  Your appointments       Date & Time Appointment Department (Center)    May 15, 2025 1:00 PM CDT Consultation with Mariaa Estrella MD [facility] Hematology Oncology [place] ([facility])        May 29, 2025 2:00 PM CDT Exam - New Patient with Shahla Lundberg DO [facility] ([facility])        2025 1:00 PM CDT Exam - New Patient with Deepti Santiago MD [facility] ([facility] )              [facility]  [facility] [place] [id number] [facility] [address] [phone]  [facility]  [address]  [facility] [address] [phone] [facility] Hematology Oncology [facility]  [address]  [facility] [id number] [phone]            [facility]  Was TCC Ordered: Yes  Was TCC Scheduled: No, Explain will call back.     Specialist  Does the patient have any other follow-up appointment(s) that need to be scheduled? Yes   -If yes: Care Manager reviewed upcoming specialist appointments with patient: Yes   -Does the patient need assistance scheduling appointment(s): No      Book By Date: 5/16/25

## 2025-05-13 NOTE — H&P
Pulmonary Consult Note    History of Present Illness:  James Roberts is a 79 year old male presenting to pulmonary clinic today for pl effusion   Ex-smoker x 37 years  Was seen in the hospital for bilateral effusions dyspnea on exertion admitted with severe neck pain--cardiology did left heart cath unremarkable-thoracentesis with improvement in dyspnea that was short-lived  Ongoing neck pain - pain in head and neck -- to see neurologist   Sinus is clogged - hx sinus netipot - helped - can't bend head to use netipot -   Dyspnea -- ongoing issues since had cancer 2004-- thinks back and has been short of breath since - can't catch his breath - same as 20 yrs ago-- thinks alittle worse since can't be active - losing weight 4 weeks - moving and packing -   Got better after tap -   Not doing anything strenous -- walking to 150 ft too much - up ramp here - not too bad- would have plan steps etc -- even 10 yrs ago unable to take care of grass 8 yrs ago -  Saw pulmonary in [place] - did not get along with - told nothing wrong -   New from 2022 by CT abd       Past Medical History:    Chronic coronary artery disease    Dysfunction of eustachian tube    Gastroesophageal reflux disease    Hyperlipidemia    Hypertension    Hypogonadism in male     Description: Per Dr. Byrne     Hypothyroidism    Non-Hodgkin's lymphoma (HCC)    Osteoarthritis      CAD angioplasty   Denies history of sleep disorder  Questionable concerns for myelodysplastic syndrome and COPD [facility]    Past Surgical History: Past Surgical History[1]    Family Medical History: Family History[2]    Social History: Social Hx on file[3]    Allergies: Celebrex  [celecoxib]     Medications: Current Medications[4]    Review of Systems: Weight remains stable  Denies any specific vision changes  Denies any sore throat difficulty swallowing or aspiration tendencies  Dyspnea remains about the same  Denies any chest pain or palpitations  Denies any  nausea vomiting or diarrhea with no prior history of GERD  Rash seems decreased some  Denies any history of sleep disorder states never told he needed study    Physical Exam:  /50   Pulse 72   Resp 16   Ht 5' 8\" (1.727 m)   Wt 157 lb (71.2 kg)   SpO2 96%   BMI 23.87 kg/m²    Constitutional: comfortable . No acute distress.   HEENT: Head NC/AT. PEERL. Throat is clear small area   Cardio: Regular rate and rhythm. Normal S1 and S2. No murmurs. No ectopy   Respiratory: Thorax symmetrical with no labored breathing. . No wheeze - pavel rales approximately 1/8 up-- no dullness   GI:  Abd soft, non-tender.  Extremities: No clubbing or cyanosis. No LE edema. No calf tenderness.no edema   Neurologic: A&Ox3. No gross motor deficits.  Skin: Warm, dry.no rashes or hives noted   Lymphatic: No cervical or supraclavicular lymphadenopathy.neck very stiff   Psych: Calm, cooperative. Pleasant affect.    Results:  Reviewed     Assessment/Plan:  # Dyspnea with mild hypoxia-etiology to be determined however evidence of fairly rapidly advancing bilateral pleural effusions with the etiology to be determined.  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 postsurgical resection and radiation  #Severe neck pain back pain thought related to moving-symptoms somewhat improved though remains a chief complaint  5/25 overall better    # sinus hx-- had sinus surgery years ago- had used flonase in the past - congested     Plan:  For PET scan Thursday   Waiting for fluid results - ill call with fluid results   To get PFTS  please   See me in 4-5 weeks -     -    Eboni Rhoades MD  Pulmonary Medicine  2025          [1]   Past Surgical History:  Procedure Laterality Date    Angioplasty (coronary)      Bowel resection      Cath angio  2025    Colectomy      Colonoscopy      Hernia surgery      Other surgical history      Rotator cuff repair Right     Skin surgery      Tonsillectomy      Vasectomy     [2]   Family History  Problem Relation Age of Onset    Alcohol and Other Disorders Associated Other     High Blood Pressure Other     Diabetes Other     Heart Disease Other    [3]   Social History  Socioeconomic History    Marital status:    Tobacco Use    Smoking status: Former     Current packs/day: 0.00     Types: Cigarettes     Quit date: 1988     Years since quittin.3    Smokeless tobacco: Never   Vaping Use    Vaping status: Never Used   Substance and Sexual Activity    Alcohol use: Yes     Comment: couple beers a day but nothing [place]    Drug use: No   [4]   Current Outpatient Medications   Medication Sig Dispense Refill    acetaminophen 500 MG Oral Tab Take 2 tablets (1,000 mg total) by mouth TID & HS for 14 days. 112 tablet 0    amLODIPine 5 MG Oral Tab Take 0.5 tablets (2.5 mg total) by mouth daily.      atenolol 25 MG Oral Tab Take 1 tablet (25 mg total) by mouth daily.      atorvastatin 10 MG Oral Tab Take 1 tablet (10 mg total) by mouth nightly.      aspirin 81 MG Oral Tab Take 1 tablet (81 mg total) by mouth every other day.      Cholecalciferol (VITAMIN D) 50 MCG (2000 UT) Oral Cap Take 1 capsule (2,000 Units total) by mouth in the morning.      vitamin E 400 UNITS Oral Cap Take 1 capsule (400 Units total) by mouth in the morning.      furosemide 20 MG Oral Tab Take 0.5 tablets (10 mg total) by mouth daily. 15 tablet 0    methocarbamol 500 MG Oral Tab Take 0.5 tablets (250 mg total) by mouth in the morning and 0.5 tablets (250 mg total) before bedtime. Do all this for 14 days. 14 tablet 0     tamsulosin 0.4 MG Oral Cap Take 1 capsule (0.4 mg total) by mouth daily.      levothyroxine 125 MCG Oral Tab Take 1 tablet (125 mcg total) by mouth before breakfast.  0    triamcinolone acetonide 0.025 % External Cream Apply topically in the morning and before bedtime.      Bioflavonoid Products (DESIRAE C OR) Take 500 mg by mouth in the morning.      Multiple Vitamins-Minerals (MULTIVITAMIN OR) Take by mouth in the morning.      pantoprazole 40 MG Oral Tab EC Take 1 tablet (40 mg total) by mouth in the morning and 1 tablet (40 mg total) before bedtime.

## 2025-05-13 NOTE — PATIENT INSTRUCTIONS
Plan:  For PET scan Thursday   Waiting for fluid results - ill call with fluid results   To get PFTS please   See me in 4-5 weeks -   Consider dr cueva , dr Santiago , dr peters     -    Eboni Rhoades MD  Pulmonary Medicine  5/13/2025

## 2025-05-14 NOTE — TELEPHONE ENCOUNTER
----- Message from Eboni Rhoades sent at 5/14/2025  4:03 PM CDT -----  Would you please be able to call this patient and let him know that the cytology from his pleural fluid that was sent while he was in the hospital has come back all negative.  That means there is no evidence of malignancy in the fluid thank you.    Patient notified of negative cytology results.

## 2025-05-23 NOTE — PROGRESS NOTES
Hematology/Oncology Follow Up  Note    Patient Name: James Roberts  Medical Record Number: [id number]    YOB: 1945   Date of Consultation: 5/1/2025   Physician requesting consultation: Dr. Reaves     Reason for Consultation:  James Roberts was seen today for the diagnosis of likely follicular lymphoma    Oncologic History:    2004: Follicular lymphoma. Likely Stage IV due to bone marrow biopsy. S/p Rituximab and fludarabine. 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  He had recurrence in 2009, which was excised and had radiation.  2011 was noted to have progression of follicular lymphoma.  He had Zevalin therapy in December 2011.      CT scan from 2022 did not reveal any residual disease..    Patient relocated to [place].  He mentions he was seen by [place] [facility] where he was being evaluated for leukopenia    April 2025:   CT chest: There is an ovoid peripheral enhancing collection arising from the anterior superior chest wall anterior to the right 1st through 3rd ribs measuring 7.3 x 2.1 x 5.7 cm.  There are some surgical clips anterior and superior to this chest wall   mass/collection     29 April 2025  Moderate to large layering bilateral pleural effusions, increased.  Mild associated basilar consolidation may be passive atelectasis.    3. Stable right chest wall mass.   4. Stable mediastinal lymph nodes.  Single, subcarinal enlarged by size criteria.     May 2025:     PET CT   - No significant hypermetabolism associated with the previously described subcarinal lymph node which measures up to 1.8 x 1.4 cm and demonstrates a peak SUV of approximately 1.6.     - There is nonspecific soft tissue nodularity and hypermetabolism noted along the bilateral inguinal regions.  On the right this has a CT measurement of approximately 1.5 x 0.8 cm with a peak SUV of 2.9.  On the left this measures 2.3  x 1.5 cm with a peak SUV of approximately 4.4.   =============================================================  History of Present Illness:      79-year-old male, hypertension, coronary artery disease and prior history of follicular lymphoma treated with rituximab and fludarabine in 2004 status post recurrence in 2011, malignant fibrous histiocytoma diagnosed in 2005 with recurrence in 2009 s/p wide local excision and radiation presents to the hospital with worsening shortness of breath.      Patient was recently moving back here from [place], was noted to have worsening dyspnea..  Patient was noted to have low oxygen saturation, and was admitted.  Patient had a CT on presentation that revealed bilateral pulmonary effusion.  Had an echocardiogram revealing mild pulmonary hypertension. He had thoracentesis - cytology negative.     Patient denies having any fevers, weight changes or appetite changes.  Complains of on going pain in his neck.on the back .    Past Medical History:  Past Medical History[1]    Past Surgical History[2]    Home Medications:  Medications Ordered Prior to Encounter[3]    Current Inpatient Medications:  Inpatient Meds:  Scheduled Medications[4]  Medication Infusions[5]    PRN Meds:  PRN Medications[6]    Allergies:   Allergies[7]    Psychosocial History:  Social History     Social History Narrative    Not on file     Short Social Hx on File[8]    Family Medical History:  Family History[9]    Review of Systems:  A 10-point ROS was done with pertinent positives and negative per the HPI    Vital Signs:  Height: --  Weight: --  BSA (Calculated - sq m): --  Pulse: --  BP: --  Temp: --  Do Not Use - Resp Rate: --  SpO2: --      Wt Readings from Last 6 Encounters:   05/15/25 69.2 kg (152 lb 8 oz)   05/13/25 71.2 kg (157 lb)   05/02/25 70.3 kg (154 lb 15.7 oz)   04/23/25 72.1 kg (159 lb)   06/13/24 78 kg (172 lb)   12/27/22 75.8 kg (167 lb)       ECOG PS: 1    Physical Examination:  General: Patient is  alert and oriented, not in acute distress  Psych: Mood and affect are appropriate  Eyes: EOMI, PERRL  ENT: Oropharynx is clear, no adenopathy  CV: Regular rate   Respiratory: Decreased breath sounds bilaterally  GI/Abd: Soft  Neurological: Grossly intact   Lymphatics: No palpable cervical, supraclavicular, axillary,  lymphadenopathy  Mass noted in the supraclavicular notch  Skin: no rashes or petechiae    Laboratory:  No results for input(s): \"WBC\", \"HGB\", \"HCT\", \"PLT\", \"MCV\", \"RDW\", \"NEPRELIM\" in the last 168 hours.      No results for input(s): \"NA\", \"K\", \"CL\", \"CO2\", \"BUN\", \"CREATSERUM\", \"GFRAA\", \"GFRNAA\", \"GLU\", \"CA\", \"PHOS\", \"TP\", \"ALB\", \"ALKPHO\", \"AST\", \"ALT\", \"BILT\" in the last 168 hours.    Invalid input(s): \"MAG\"      No results for input(s): \"PT\", \"INR\", \"PTT\", \"FIB\" in the last 168 hours.      Imaging:    CT chest and abdomen reviewed     Impression & Plan:     79 year old male with prior history of follicular lymphoma, malignant fibrous histiocytoma presenting for follow up         Follicular lymphoma s/p rituximab and fludarabine  - s/p 6 cycles of fludarabine and rituximab in 2004, treated with ibritumomab tiuxetan in 2011.   - CT of the abdomen revealed a mediastinal lymph node 2.3 into 1.5 cm in size.,  There were scattered small lymph nodes seen in the mediastinum, hilum and subcarinal  PET CT revealed lymphadenopathy involving the bilateral inguinal LN with SUV of 2.9-4.4,   - Patient has no constitutional symptoms.  Noted to have mild anemia.    We will continue to monitor    Anemia      Mild anemia with hemoglobin of 9.7, normal white count, platelet count.   - Iron sat- 21 Ferritin elevated at 1053.   - Noted to have lymphocytopenia.  -Check LDH, iron studies, vitamin B12, folate, monoclonal protein  - Patient had bone marrow biopsy that revealed MDS at [place].   - we will continue to monitor his CBC every 3 months        Bilateral pulmonary effusion  - Increasing in size, etiology is unknown.   Patient is being evaluated by pulmonology for further evaluation.  Flow cytometry and cytology from pleural fluid revealed histiocytes and reactive mesothelial cells. No malignant cells.       Fibrous histiocytoma, also known as undifferentiated pleomorphic sarcoma  -Treated with wide local excision, radiation in  and .  - No other known sites of recurrence.      Follow up in 3 months with repeat CBC, CMP, LDH      Mariaa Estrella MD  [facility]  [facility] KIT[facility]mond [facility] [place]                [1]   Past Medical History:   Chronic coronary artery disease    Dysfunction of eustachian tube    Gastroesophageal reflux disease    Hyperlipidemia    Hypertension    Hypogonadism in male    Description: Per Dr. Byrne     Hypothyroidism    Non-Hodgkin's lymphoma (HCC)    Osteoarthritis   [2]   Past Surgical History:  Procedure Laterality Date    Angioplasty (coronary)      Bowel resection      Cath angio  2025    Colectomy      Colonoscopy      Hernia surgery      Other surgical history      Rotator cuff repair Right     Skin surgery      Tonsillectomy      Vasectomy     [3]   Current Outpatient Medications on File Prior to Visit   Medication Sig Dispense Refill    Cholecalciferol (VITAMIN D) 50 MCG (2000 UT) Oral Cap Take 1 capsule (2,000 Units total) by mouth in the morning.      vitamin E 400 UNITS Oral Cap Take 1 capsule (400 Units total) by mouth in the morning.      furosemide 20 MG Oral Tab Take 0.5 tablets (10 mg total) by mouth daily. 15 tablet 0    [] methocarbamol 500 MG Oral Tab Take 0.5 tablets (250 mg total) by mouth in the morning and 0.5 tablets (250 mg total) before bedtime. Do all this for 14 days. 14 tablet 0    [] acetaminophen 500 MG Oral Tab Take 2 tablets (1,000 mg total) by mouth TID & HS for 14 days. 112 tablet 0    [] HYDROcodone-acetaminophen 5-325 MG Oral Tab Take 1 tablet by mouth  every 8 (eight) hours as needed for Pain. 10 tablet 0    amLODIPine 5 MG Oral Tab Take 0.5 tablets (2.5 mg total) by mouth daily.      atenolol 25 MG Oral Tab Take 1 tablet (25 mg total) by mouth daily.      atorvastatin 10 MG Oral Tab Take 1 tablet (10 mg total) by mouth nightly.      tamsulosin 0.4 MG Oral Cap Take 1 capsule (0.4 mg total) by mouth daily.      levothyroxine 125 MCG Oral Tab Take 1 tablet (125 mcg total) by mouth before breakfast.  0    triamcinolone acetonide 0.025 % External Cream Apply topically in the morning and before bedtime.      aspirin 81 MG Oral Tab Take 1 tablet (81 mg total) by mouth every other day.      Bioflavonoid Products (DESIRAE C OR) Take 500 mg by mouth in the morning.      Multiple Vitamins-Minerals (MULTIVITAMIN OR) Take by mouth in the morning.      pantoprazole 40 MG Oral Tab EC Take 1 tablet (40 mg total) by mouth in the morning and 1 tablet (40 mg total) before bedtime.       Current Facility-Administered Medications on File Prior to Visit   Medication Dose Route Frequency Provider Last Rate Last Admin    [COMPLETED] lidocaine PF (Xylocaine-MPF) 1 % injection             [COMPLETED] verapamil (Isoptin) 2.5 mg/mL injection             [COMPLETED] heparin (Porcine) 5000 UNIT/ML injection             [COMPLETED] Nitroglycerin in D5W 200-5 MCG/ML-% injection             [COMPLETED] iopamidol 76% (ISOVUE-370) injection for power injector  100 mL Injection ONCE PRN Riley Reaves MD   70 mL at 05/01/25 0940    [COMPLETED] fentaNYL (Sublimaze) 50 mcg/mL injection             [COMPLETED] midazolam (Versed) 2 MG/2ML injection             [COMPLETED] tolvaptan (Samsca) tab 15 mg  15 mg Oral Once Jamar Erazo MD   15 mg at 05/01/25 1425    [COMPLETED] amLODIPine (Norvasc) tab 2.5 mg  2.5 mg Oral Once Nathaniel Jeffries MD   2.5 mg at 04/30/25 1030    [COMPLETED] sodium chloride 0.9% infusion   Intravenous On Call Nathaniel Jeffries MD 20 mL/hr at 05/01/25 0746 New Bag at  25 0746    [] hydrALAzine (Apresoline) 20 mg/mL injection 10 mg  10 mg Intravenous Q6H PRN Nigel Escobar MD        [COMPLETED] furosemide (Lasix) 10 mg/mL injection 40 mg  40 mg Intravenous Once Nigel Milligan MD   40 mg at 25 1251    [COMPLETED] iopamidol 76% (ISOVUE-370) injection for power injector  100 mL Intravenous ONCE PRN Nigel Milligan MD   100 mL at 25 1421    [COMPLETED] acetaminophen (Tylenol Extra Strength) tab 1,000 mg  1,000 mg Oral Once Magy Castaneda MD   1,000 mg at 25 1436    [COMPLETED] iopamidol 76% (ISOVUE-370) injection for power injector  70 mL Intravenous ONCE PRN Nina Seth MD   70 mL at 25 1803    [COMPLETED] sodium chloride 0.9 % IV bolus 1,000 mL  1,000 mL Intravenous Once Leatha Green MD   Stopped at 25 182    [COMPLETED] morphINE PF 4 MG/ML injection 4 mg  4 mg Intravenous Once Leatha Green MD   4 mg at 25   [4] [5] [6] [7]   Allergies  Allergen Reactions    Celebrex  [Celecoxib]    [8]   Social History  Socioeconomic History    Marital status:    Tobacco Use    Smoking status: Former     Current packs/day: 0.00     Types: Cigarettes     Quit date: 1988     Years since quittin.4    Smokeless tobacco: Never   Vaping Use    Vaping status: Never Used   Substance and Sexual Activity    Alcohol use: Yes     Comment: couple beers a day but nothing since [facility]    Drug use: No     Social Drivers of Health     Food Insecurity: No Food Insecurity (2025)    NCSS - Food Insecurity     Worried About Running Out of Food in the Last Year: No     Ran Out of Food in the Last Year: No   Transportation Needs: No Transportation Needs (2025)    NCSS - Transportation     Lack of Transportation: No   Housing Stability: Not At Risk (2025)    NCSS - Housing/Utilities     Has Housing: Yes     Worried About Losing Housing: No     Unable to Get Utilities: No   [9]   Family History  Problem Relation Age of  Onset    Alcohol and Other Disorders Associated Other     High Blood Pressure Other     Diabetes Other     Heart Disease Other

## 2025-05-27 NOTE — PROCEDURES
Pulmonary Function Test Report  Findings:  Prebronchodilator FEV1 is 2.26L, z-score -0.85.  Prebronchodilator FVC is 3.12L, z-score -0.75.                           Postbronchodilator FEV1 is 2.43L, z-score -0.46.  Postbronchodilator FVC is 3.17L, z-score -0.65.   FEV1/ FVC ratio is 73 %, z-score -0.47.   There is no significant bronchodilator response after administration of albuterol.    The flow-volume loop demonstrates a normal pattern.  The TLC is 5.97L, z-score -0.73. The residual volume 2.85L, z-score 0.34.   The diffusion capacity is 11.17, z-score -3.45 and -2.63 when corrected for alveolar volume.     Impression:  Spirometry is normal. and There is no significant bronchodilator response, but this does not preclude treatment with bronchodilators.  Lung volumes are normal.     Diffusion capacity is moderate z-score (-2.51 to -4.0).     This reduced diffusion capacity is out of proportion to the observed airway obstruction and  the isolated decrease in diffusion capacity  can be seen in emphysema, interstitial lung disease, pulmonary vascular disease (such as pulmonary hypertension) and anemia. If not already performed, would suggest further evaluation as determined clinically.      Disclaimer: This PFT has been interpreted based on Z-score/LLN/ULN criteria as described in ATS guidelines 2022.   Maria E Rothman MD  [facility] Pulmonary Medicine  Office: [phone]

## 2025-05-29 NOTE — PATIENT INSTRUCTIONS
Continue your medications as prescribed.    Schedule your physical therapy.    Keep all your appointments as scheduled with your specialists.    See me in Feb/March for your Medicare annual wellness exam.

## 2025-05-29 NOTE — PROGRESS NOTES
The 21st Century Cures Act makes medical notes like these available to patients in the interest of transparency. Please be advised this is a medical document. Medical documents are intended to carry relevant information, facts as evident, and the clinical opinion of the practitioner. The medical note is intended as peer to peer communication and may appear blunt or direct. It is written in medical language and may contain abbreviations or verbiage that are unfamiliar.       James Roberts is a 79 year old male.    HPI:     Chief Complaint   Patient presents with    Establish Care       This 79-year-old male who is a new patient to my practice presents to the office to establish care.  He has multiple medical problems.    The patient had been admitted twice to [facility] once on 4/23/2025 and 4/29/2025 due to severe head and neck pain.  On his 4/29/25 ER visit, he was also experiencing shortness of breath and was in acute respiratory failure with hypoxia.  He was initially worked up for possible congestive heart failure. He had left heart cath which showed no significant obstructive disease and normal LVEDP.  TTE showed mild to moderate pulmonary hypertension, preserved ejection fraction, G1DD.  CT of the chest showed bilateral pleural effusions and he underwent thoracocentesis with extraction of 250 cc from the right chest.  This gave him immediate symptomatic relief.  He was discharged to home with follow-up with pulmonary, cardiology, oncology and neurology.  Since his discharge home, he is not having any chest pain, palpitations, dizziness, syncope or leg swelling.  He will be following up with Dr. Jeffries, his cardiologist.  He is taking amlodipine 5 mg 0.5 tablets once daily, atenolol 25 mg daily for his blood pressure and atorvastatin 10 mg nightly for his cholesterol and aspirin 81 mg daily.    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  He was treated with rituximab and fludarabine.  He was then in complete remission.  He was subsequently diagnosed with malignant fibrous histiocytoma in 2005 status post wide local excision and external beam radiation.  He had recurrence in 2009 which was excised and had radiation.  In 2011, he was noted to have progression of follicular lymphoma and he underwent Zevalin therapy in December 2011.  Patient had CT of the chest 4/29/2025 and PET scan May 2025.  He has been seen by  of oncology on 5/15/2025 and the above information was found in his note. Patient currently has no constitutional symptoms but has some mild anemia and it will be followed up by oncology in 3 months.    Patient has seen Dr. Rhoades of pulmonary on 5/13/2025.  He has had ongoing issues with dyspnea since initially diagnosed with this cancer in 2004.  He does state the shortness of breath has been better after he had the thoracocentesis.  The patient had PFTs done on 5/20/2025 and these were normal.  He has a follow-up appointment with Dr. Rhoades in 4 weeks.  He was still awaiting results from the thoracocentesis.  He is a former smoker.    The patient has a longstanding history of hyponatremia and had been seen by Dr. Erazo of nephrology while in the hospital and was diagnosed with SIADH.  Patient was sent home on low-dose furosemide 10 mg daily to impair urinary concentrating ability.  Modest fluid restriction but no sodium restriction was needed.    He has history for hypothyroidism which was diagnosed 5 to 6 years ago and he is taking levothyroxine 125 mg daily.  Last TSH done on 5/1/2025 was normal.    Patient required a bowel resection in 2004 due to a bowel obstruction secondary to enlarged lymph nodes from his lymphoma.  He did not require colostomy.  He states his bowel habits are normal.  He is not having any abdominal pain, nausea, vomiting, diarrhea, melena or hematochezia.    The patient is under  the care of his urologist for BPH and is taking his tamsulosin.  He denies any dysuria, hematuria or difficulty emptying his bladder.    He is scheduled to see  of neurology tomorrow for his chronic neck and head pain.  The patient moved from [place] in March.  He states they only had 3 weeks to pack up the house.  He did a lot of lifting, bending and carrying which triggered the pain.  He has known history for arthritis in his neck.  No previous history for herniated disc.  He has never had any neck surgery.  He has been taking Tylenol for the pain without any relief.  He is unable to sleep because of the pain.  He has pain with any motion of his head.  He is denying any paresthesias, numbness or weakness into the arms or legs.    He does see his dermatologist for follow-up on multiple skin lesions on his scalp.  He has had melanoma removed from his scalp previously.    The patient sees his gastroenterologist and had EGD and colonoscopy in August 2024.  EGD showed early Frank's and he is taking pantoprazole 40 mg twice daily.  Colonoscopy showed benign polyps.    He wears glasses and bilateral hearing aids.    The patient states he had his Medicare annual wellness exam in February while he was still in [place].    HISTORY:  Past Medical History[1]   Past Surgical History[2]   Family History[3]   Social History: Short Social Hx on File[4]     Medications (Active prior to today's visit):  Current Medications[5]    Allergies:  Allergies[6]    ROS:     Pertinent positives and pertinent negatives are as listed in HPI.    All other review of symptoms were reviewed and negative.    PHYSICAL EXAM:   /48 (BP Location: Left arm, Patient Position: Sitting, Cuff Size: adult)   Pulse 91   Temp 97.7 °F (36.5 °C)   Resp 18   Ht 5' 6\" (1.676 m)   Wt 159 lb (72.1 kg)   SpO2 96%   PF (!) 8 L/min   BMI 25.66 kg/m²     Wt Readings from Last 3 Encounters:   05/29/25 159 lb (72.1 kg)   05/15/25 152 lb 8 oz (69.2  kg)   05/13/25 157 lb (71.2 kg)       BP Readings from Last 3 Encounters:   05/29/25 100/48   05/15/25 104/51   05/13/25 104/50       General: Well-nourished, well hydrated.  Moderate discomfort due to his neck pain, no pallor.   HEENT: Normocephalic, atraumatic.  JENNIFER, EOMI, Sclera clear and non icteric bilaterally. TM's normal, nose without congestion, pharynx without redness or exudates. Moist mucous membranes.  Neck: Supple. No lymphadenopathy. No thyromegaly. No bruits noted.  Patient has significant paraspinal muscle spasm and tightness which is very tender to the touch.  He has limited range of motion of his neck in all planes.  Heart: RRR without S3 or S4 or murmur.  Lungs: Clear to auscultation bilaterally. No rales, rhonchi or wheezes. No tachypnea or retractions noted.  Abdomen: Soft, nontender, nondistended, normal bowel sounds. No organomegaly. No guarding, rigidity or rebound noted.  Extremities: No edema bilaterally.  Skin: Warm and dry.  Neuro: Alert and oriented x 3.Cr. N. II-XII intact, normal gait.  Psych: Normal mood and affect.     ASSESSMENT/PLAN:   79 year old male with      1. Essential hypertension    Blood pressure is well-controlled on his current medication.    2. Mixed hyperlipidemia    Cholesterol:     Lab Results   Component Value Date    CHOLEST 81 05/01/2025    CHOLEST 116 07/30/2018    CHOLEST 138 12/23/2017     Lab Results   Component Value Date    HDL 20 (L) 05/01/2025    HDL 39 (L) 07/30/2018    HDL 47 12/23/2017     Lab Results   Component Value Date    TRIG 87 05/01/2025    TRIG 69 05/21/2022    TRIG 106 07/30/2018    TRIGLY 86 05/11/2015    TRIGLY 121 05/08/2014     Lab Results   Component Value Date    LDL 43 05/01/2025    LDL 56 07/30/2018    LDL 62 12/23/2017     Lab Results   Component Value Date    AST 27 05/02/2025    AST 39 (H) 05/01/2025    AST 39 (H) 04/30/2025     Lab Results   Component Value Date    ALT 42 05/02/2025    ALT 46 05/01/2025    ALT 51 (H) 04/30/2025      Lipids are at goal on his current medication.    3. Chronic coronary artery disease    Recent cardiac cath in April showed no significant obstructive disease.  No evidence for CHF.  The patient is asymptomatic.  He is being followed by his cardiologist, Dr. Jeffries.    4. SIADH (syndrome of inappropriate ADH production) (HCC)    The patient had been seen by Dr. Erazo while in the hospital and was discharged to home on low-dose furosemide.    5. Non-Hodgkin lymphoma of intra-abdominal lymph nodes, unspecified non-Hodgkin lymphoma type (HCC)  6. Follicular lymphoma, unspecified follicular lymphoma type, unspecified body region (HCC)  7. Myelodysplastic syndrome (HCC)  8. Anemia, unspecified type    The patient has just seen the oncologist, , and is being monitored every 3 months.    9. Neck pain  10. Osteoarthritis of spine with radiculopathy, cervical region    The patient has had exacerbation of his arthritis in his neck probably due to all the packing and lifting and carrying he did with his recent move.  He has significant paraspinal muscle tenderness.  I have placed a referral for physical therapy.  He should keep his appointment as scheduled with neurology tomorrow. He was told he can use ice or moist heat to the neck.    - OP REFERRAL TO [facility]    11. 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 Gastroesophageal reflux disease, unspecified whether esophagitis present    The patient has been seen by his gastroenterologist and had EGD and colonoscopy in August 2024 which showed early Frank's and some benign colon polyps.  He is currently asymptomatic.  He is taking his pantoprazole 40 mg twice daily.    13. Acquired hypothyroidism    Most recent TSH was normal.  Continue current dose of levothyroxine.    14. Enlarged prostate    The patient is under the care of his urologist.  His symptoms are well-controlled with his tamsulosin.    15. History of melanoma    The patient  is under the care of his dermatologist and is being checked in follow-up of his melanoma regularly.    Other orders  - ketoconazole 2 % External Shampoo; Apply topically.  - clobetasol 0.05 % External Solution; Apply topically.  - clobetasol 0.05 % External Cream; Apply topically.         Health Maintenance:    Health Maintenance   Topic Date Due    Annual Physical  Never done    Colorectal Cancer Screening  09/15/2018    COVID-19 Vaccine (8 - 2024-25 season) 04/29/2025    Influenza Vaccine  Completed    Annual Depression Screening  Completed    Fall Risk Screening (Annual)  Completed    Pneumococcal Vaccine: 50+ Years  Completed    Meningococcal B Vaccine  Aged Out         Meds This Visit:  Requested Prescriptions      No prescriptions requested or ordered in this encounter       Imaging & Referrals:  OP REFERRAL TO [facility]     Patient understands plan and follow-up.  Follow up in 2/2026 for Medicare annual wellness exam    5/29/2025  Shahla Lundberg DO    Total time: 60 minutes including precharting, H&P, plan of care    This dictation was performed with a verbal recognition program (DRAGON) and it was checked for errors. It is possible that there are still dictated errors within this office note. If so, please bring any errors to my attention for an addendum. All efforts were made to ensure that this office note is accurate         [1]   Past Medical History:   ALCOHOL USE    Anemia    Anxiety    Atherosclerosis of coronary artery    Chronic coronary artery disease    Congestive heart disease (HCC)    Depression    Sometimes    Dysfunction of eustachian tube    Essential hypertension    Gastroesophageal reflux disease    Hyperlipidemia    Hypertension    Hypogonadism in male    Description: Per Dr. Byrne     Hypothyroidism    Non-Hodgkin's lymphoma (HCC)    Osteoarthritis   [2]   Past Surgical History:  Procedure Laterality Date    Angioplasty (coronary)      Bowel resection      Cath  angio  2025    Colectomy      Colonoscopy      Hernia surgery      Other surgical history      Rotator cuff repair Right     Skin surgery      Tonsillectomy      Vasectomy     [3]   Family History  Problem Relation Age of Onset    Alcohol and Other Disorders Associated Other     High Blood Pressure Other     Diabetes Other     Heart Disease Other     Dementia Mother     Heart Disorder Mother     Dementia Father     Heart Disorder Father     Heart Disorder Brother     Heart Disorder Brother    [4]   Social History  Socioeconomic History    Marital status:    Tobacco Use    Smoking status: Former     Current packs/day: 0.00     Average packs/day: 2.0 packs/day for 25.0 years (50.0 ttl pk-yrs)     Types: Cigarettes     Quit date: 1988     Years since quittin.1    Smokeless tobacco: Never   Vaping Use    Vaping status: Never Used   Substance and Sexual Activity    Alcohol use: Not Currently     Comment: couple beers a day but nothing since [facility]    Drug use: No     Social Drivers of Health     Food Insecurity: No Food Insecurity (2025)    NCSS - Food Insecurity     Worried About Running Out of Food in the Last Year: No     Ran Out of Food in the Last Year: No   Transportation Needs: No Transportation Needs (2025)    NCSS - Transportation     Lack of Transportation: No   Housing Stability: Not At Risk (2025)    NCSS - Housing/Utilities     Has Housing: Yes     Worried About Losing Housing: No     Unable to Get Utilities: No   [5]   Current Outpatient Medications   Medication Sig Dispense Refill    ketoconazole 2 % External Shampoo Apply topically.      clobetasol 0.05 % External Solution Apply topically.      clobetasol 0.05 % External Cream Apply topically.      Cholecalciferol (VITAMIN D) 50 MCG (2000 UT) Oral Cap Take 1 capsule (2,000 Units total) by mouth in the morning.      vitamin E 400 UNITS Oral Cap Take 1 capsule (400 Units total) by mouth in the morning.      furosemide 20  MG Oral Tab Take 0.5 tablets (10 mg total) by mouth daily. 15 tablet 0    amLODIPine 5 MG Oral Tab Take 0.5 tablets (2.5 mg total) by mouth daily.      atenolol 25 MG Oral Tab Take 1 tablet (25 mg total) by mouth daily.      atorvastatin 10 MG Oral Tab Take 1 tablet (10 mg total) by mouth nightly.      tamsulosin 0.4 MG Oral Cap Take 1 capsule (0.4 mg total) by mouth daily.      levothyroxine 125 MCG Oral Tab Take 1 tablet (125 mcg total) by mouth before breakfast.  0    triamcinolone acetonide 0.025 % External Cream Apply topically in the morning and before bedtime.      aspirin 81 MG Oral Tab Take 1 tablet (81 mg total) by mouth every other day.      Bioflavonoid Products (DESIRAE C OR) Take 500 mg by mouth in the morning.      Multiple Vitamins-Minerals (MULTIVITAMIN OR) Take by mouth in the morning.      pantoprazole 40 MG Oral Tab EC Take 1 tablet (40 mg total) by mouth in the morning and 1 tablet (40 mg total) before bedtime.     [6]   Allergies  Allergen Reactions    Celecoxib RASH

## 2025-05-30 NOTE — PATIENT INSTRUCTIONS
Refill policies:    Allow 2-3 business days for refills; controlled substances may take longer.  Contact your pharmacy at least 5 days prior to running out of medication and have them send an electronic request or submit request through the “request refill” option in your [employer] account.  Refills are not addressed on weekends; covering physicians do not authorize routine medications on weekends.  No narcotics or controlled substances are refilled after noon on Fridays or by on call physicians.  By law, narcotics must be electronically prescribed.  A 30 day supply with no refills is the maximum allowed.  If your prescription is due for a refill, you may be due for a follow up appointment.  To best provide you care, patients receiving routine medications need to be seen at least once a year.  Patients receiving narcotic/controlled substance medications need to be seen at least once every 3 months.  In the event that your preferred pharmacy does not have the requested medication in stock (e.g. Backordered), it is your responsibility to find another pharmacy that has the requested medication available.  We will gladly send a new prescription to that pharmacy at your request.    Scheduling Tests:    If your physician has ordered radiology tests such as MRI or CT scans, please contact Central Scheduling at [phone] right away to schedule the test.  Once scheduled, the [facility] Centralized Referral Team will work with your insurance carrier to obtain pre-certification or prior authorization.  Depending on your insurance carrier, approval may take 3-10 days.  It is highly recommended patients assure they have received an authorization before having a test performed.  If test is done without insurance authorization, patient may be responsible for the entire amount billed.      Precertification and Prior Authorizations:  If your physician has recommended that you have a procedure or additional testing performed the [facility]  Centralized Referral Team will contact your insurance carrier to obtain pre-certification or prior authorization.    You are strongly encouraged to contact your insurance carrier to verify that your procedure/test has been approved and is a COVERED benefit.  Although the [facility] Centralized Referral Team does its due diligence, the insurance carrier gives the disclaimer that \"Although the procedure is authorized, this does not guarantee payment.\"    Ultimately the patient is responsible for payment.   Thank you for your understanding in this matter.  Paperwork Completion:  If you require FMLA or disability paperwork for your recovery, please make sure to either drop it off or have it faxed to our office at [phone]. Be sure the form has your name and date of birth on it.  The form will be faxed to our Forms Department and they will complete it for you.  There is a 25$ fee for all forms that need to be filled out.  Please be aware there is a 10-14 day turnaround time.  You will need to sign a release of information (ELLEN) form if your paperwork does not come with one.  You may call the Forms Department with any questions at [phone].  Their fax number is [phone].

## 2025-05-30 NOTE — PROGRESS NOTES
Neurology History & Physical     ASSESSMENT & PLAN:      ICD-10-CM    1. Neck pain  M54.2 MRI SPINE CERVICAL (W+WO) (CPT=72156)      2. RUE numbness  R20.0 MRI SPINE CERVICAL (W+WO) (CPT=72156)      3. Imbalance  R26.89 MRI SPINE CERVICAL (W+WO) (CPT=72156)        Neck pain and morning headaches, likely cervicogenic / occipital in origin.  Has worsening RUE numbness and imbalance, though pain is more left sided.  Obtain MRI C spine turner given cancer history.  Ok to start PT.  Start  mg BID (HS standing, and daily PRN turner prior to PT).    TCA could be considered as well (normal QTc), but would avoid OXC given chronic hyponatremia.      We discussed medication side effects. They verbalized understanding and agreement.    Total time I spent caring for the patient was 40 minutes on the day of the encounter related to this visit, including chart review and documentation before and after the visit, including time spent during the visit, but not including separately reported activities or procedures.    I intend to be providing an ongoing, continuous, and active collaborative plan of care for the problem(s) above (the management of which require my specialized clinical knowledge, skill, and expertise).      Return in about 2 months (around 7/30/2025).       ~~~~~~~~~~~~~~~~~~~~~~~~~~~    CHIEF COMPLAINT / REASON FOR VISIT:    Chief Complaint   Patient presents with    Neurologic Problem     Patient is here for neck spine and head issues  Patient stated he can't turn his neck when he tries he is in a lot of pain  Patient is with significant other today   Patient had CT scan/PET scan done when he went to ER   Patient was seen at [facility]  Patient stated this has been on going for 6-8 weeks      HISTORY OBTAINED FROM:  Patient and others as above  Chart review    HISTORY:  James Roberts is a 79 year old male with HTN, HL, multiple cancers (see below), hyponatremia, who presented to ER April 2025 with dyspnea, as well as  neck and head pain.  Was admitted due to bilat pleural effusions, had thora with symptomatic improvement.  Had unremarkable cardiac cath.  Neuro saw him in hospital for the pains.    He describes 2-3 months of shock-like pains radiating from left >> right neck up past the left >> right occiput and to the left shoulder (but not arm).  He feels it was triggered by lots of lifting when he was packing up in March (moved from [place] to [place]).  PT recommended by PCP yesterday.    Does awaken with frontal or occipital headaches most morning si the past 2-3 months, that is also new.    Tylenol only partially effective.    No limb weakness (has chronic finger tip tingling from Raynauds).  Does feel RUE numbness when laying on right side (that is chronic but worse in past few months).  No vision or hearing changes.  Has chronic tinnitus.  No dizziness, does feel off balance in past few months but no falls.    No tremors.  No back pain.    No bowel or bladder issues.    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 S/p Rituximab and fludarabine. 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  He had recurrence in 2009, which was excised and had radiation.  2011 was noted to have progression of follicular lymphoma.  He had Zevalin therapy in December 2011.\"    April 2025  EKG QTc 373 and 425  Disch summ  Neuro note (Raj)   CTA head unremarkable  CT C spine \"mild multilevel degenerative changes\"    PHYSICAL EXAMINATION:  /54   Pulse 82   Resp 16   Ht 68\"   Wt 158 lb (71.7 kg)   SpO2 99%   BMI 24.02 kg/m²     Gen: in NAD  MSE: AAOx3, nl language, nl attn/conc, nl fund of knowledge  CN: PERRL, VFF; EOMI, no nystagmus; nl facial mvmt bilaterally; nl hearing bilaterally; nl palate elevation bilaterally; nl  voice; nl shoulder shrug b/I; nl tongue movement  Motor: 5/5 x4; no drift; normal tone; no abnormal movements  Sensory: nl vibration x4, decreased PP in right pinky finger and right lateral forearm  Coord: nl FTN b/I  Reflex: 1+ BUE and BLE  Gait: normal    Allergies   Allergen Reactions    Celecoxib RASH       Current medications:  Current Outpatient Medications on File Prior to Visit   Medication Sig Dispense Refill    ketoconazole 2 % External Shampoo Apply topically.      clobetasol 0.05 % External Solution Apply topically.      clobetasol 0.05 % External Cream Apply topically.      Cholecalciferol (VITAMIN D) 50 MCG (2000 UT) Oral Cap Take 1 capsule (2,000 Units total) by mouth in the morning.      vitamin E 400 UNITS Oral Cap Take 1 capsule (400 Units total) by mouth in the morning.      furosemide 20 MG Oral Tab Take 0.5 tablets (10 mg total) by mouth daily. 15 tablet 0    amLODIPine 5 MG Oral Tab Take 0.5 tablets (2.5 mg total) by mouth daily.      atenolol 25 MG Oral Tab Take 1 tablet (25 mg total) by mouth daily.      atorvastatin 10 MG Oral Tab Take 1 tablet (10 mg total) by mouth nightly.      tamsulosin 0.4 MG Oral Cap Take 1 capsule (0.4 mg total) by mouth daily.      levothyroxine 125 MCG Oral Tab Take 1 tablet (125 mcg total) by mouth before breakfast.  0    triamcinolone acetonide 0.025 % External Cream Apply topically in the morning and before bedtime.      aspirin 81 MG Oral Tab Take 1 tablet (81 mg total) by mouth every other day.      Bioflavonoid Products (DESIRAE C OR) Take 500 mg by mouth in the morning.      Multiple Vitamins-Minerals (MULTIVITAMIN OR) Take by mouth in the morning.      pantoprazole 40 MG Oral Tab EC Take 1 tablet (40 mg total) by mouth in the morning and 1 tablet (40 mg total) before bedtime.       No current facility-administered medications on file prior to visit.        Past Medical History:    ALCOHOL USE    Anemia    Anxiety    Atherosclerosis of coronary artery     Chronic coronary artery disease    Congestive heart disease (HCC)    Depression    Sometimes    Dysfunction of eustachian tube    Essential hypertension    Gastroesophageal reflux disease    Hyperlipidemia    Hypertension    Hypogonadism in male    Description: Per Dr. Byrne     Hypothyroidism    Non-Hodgkin's lymphoma (HCC)    Osteoarthritis       Past Surgical History:   Procedure Laterality Date    Angioplasty (coronary)      Bowel resection      Cath angio  2025    Colectomy      Colonoscopy      Hernia surgery      Other surgical history      Rotator cuff repair Right     Skin surgery      Tonsillectomy      Vasectomy         Social History     Socioeconomic History    Marital status:    Tobacco Use    Smoking status: Former     Current packs/day: 0.00     Average packs/day: 2.0 packs/day for 25.0 years (50.0 ttl pk-yrs)     Types: Cigarettes     Quit date: 1988     Years since quittin.1    Smokeless tobacco: Never   Vaping Use    Vaping status: Never Used   Substance and Sexual Activity    Alcohol use: Yes     Comment: couple beers a day but nothing since     Drug use: No   Other Topics Concern    Caffeine Concern Yes     Comment: Coffee       Family History   Problem Relation Age of Onset    Alcohol and Other Disorders Associated Other     High Blood Pressure Other     Diabetes Other     Heart Disease Other     Dementia Mother     Heart Disorder Mother     Dementia Father     Heart Disorder Father     Heart Disorder Brother     Heart Disorder Brother        Deepti Santiago MD, FAES, FAAN  Board-Certified in Neurology, Epilepsy, and Clinical Neurophysiology  [facility]

## 2025-06-10 NOTE — TELEPHONE ENCOUNTER
Called and talked to patient. [facility] has not received his medications. He would like them resent to the [place] [facility].

## 2025-06-10 NOTE — PROGRESS NOTES
The 21st Century Cures Act makes medical notes like these available to patients in the interest of transparency. Please be advised this is a medical document. Medical documents are intended to carry relevant information, facts as evident, and the clinical opinion of the practitioner. The medical note is intended as peer to peer communication and may appear blunt or direct. It is written in medical language and may contain abbreviations or verbiage that are unfamiliar.       James Roberts is a 79 year old male.    HPI:     Chief Complaint   Patient presents with    Hand Pain     X5 days - left hand swollen       This 79-year-old male presents to the office with 5-day history of worsening left hand pain redness and swelling which started spontaneously.  He denies any injury or trauma.  He denies any fever or chills.  He states he was at the dermatologist yesterday who evaluated the his hand and told him he should see his PCP as he was concerned for possible infection.  The patient thought perhaps it was due to arthritis.  He denies any bug bites or noticeable wounds to the hand.  He has been taking Tylenol with no relief.  He is not having pain with movement of the fingers and the wrist.  He has not noticed any red streaking up the arm but he states the pain is shooting up the arm.  He has never had this previously.  He is not having any symptoms to the right hand.  He is not a diabetic.    HISTORY:  Past Medical History[1]   Past Surgical History[2]   Family History[3]   Social History: Short Social Hx on File[4]     Medications (Active prior to today's visit):  Current Medications[5]    Allergies:  Allergies[6]    ROS:     Pertinent positives and pertinent negatives are as listed in HPI.    All other review of symptoms were reviewed and negative.    PHYSICAL EXAM:   /54 (BP Location: Left arm, Patient Position: Sitting, Cuff Size: adult)   Pulse 76   Temp 98.1 °F (36.7 °C)   Resp 18   Ht 5' 8\"  (1.727 m)   Wt 157 lb (71.2 kg)   SpO2 97%   BMI 23.87 kg/m²     Wt Readings from Last 3 Encounters:   06/10/25 157 lb (71.2 kg)   05/30/25 158 lb (71.7 kg)   05/29/25 159 lb (72.1 kg)       BP Readings from Last 3 Encounters:   06/10/25 116/54   05/30/25 110/54   05/29/25 100/48       General: Well-nourished, well hydrated.  In severe discomfort due to the left hand pain.  Is favoring the left hand.  No pallor.   HEENT: Normocephalic, atraumatic.  Sclera clear and non icteric bilaterally.   Extremities: No leg edema bilaterally.  The patient has significant redness and swelling across the MCP joints of the right hand with stiffness to the fingers.  He is having difficulty flexing and making a fist due to the pain and swelling.  CMS is otherwise intact to the left hand.  There is no open wound noted.  His hand is quite warm to the touch.  This warmth radiates up to the wrist.  There is no evidence for lymphangitis.  He is able to fully extend the left elbow.  No left epitrochlear lymph nodes are palpated.  He has worsening pain to the hand with motion of the left wrist.  Skin: Warm and dry.  He has multiple areas of ecchymosis to bilateral arms.  Neuro: Alert and oriented x 3.normal gait.  Psych: Normal mood and affect.     ASSESSMENT/PLAN:   79 year old male with        1. Cellulitis of other specified site    I discussed the cellulitis with the patient.  He is given a gram of Rocephin IM while in the office.  He is to start cefuroxime 250 mg 1 twice daily for 10 days tomorrow.  Usage and side effects are reviewed.  He was given a prescription for Norco 5/325 1 tablet every 6 hours as needed for severe pain.  Usage and side effects were reviewed.  The patient should apply cool compresses 5 to 10 minutes several times a day to help reduce the swelling.  He should avoid applying heat as this can worsen the swelling.  He should keep the left hand elevated.  He was advised to go to the emergency room if he develops  fever, chills, increasing pain redness or swelling to the left hand or red streaking up the arm.  He will follow-up in 2 days for recheck.    Patient rechecked at 11 am. Tolerated the Rocephin injection without complications. Voice understanding of discharge instructions.    - cefTRIAXone (Rocephin) injection 1 g  - cefuroxime 250 MG Oral Tab; Take 1 tablet (250 mg total) by mouth 2 (two) times daily.  Dispense: 20 tablet; Refill: 0  - HYDROcodone-acetaminophen 5-325 MG Oral Tab; Take 1 tablet by mouth every 6 (six) hours as needed for Pain.  Dispense: 20 tablet; Refill: 0         Health Maintenance:    Health Maintenance   Topic Date Due    Annual Physical  Never done    Colorectal Cancer Screening  09/15/2018    COVID-19 Vaccine (8 - 2024-25 season) 04/29/2025    Influenza Vaccine  Completed    Annual Depression Screening  Completed    Fall Risk Screening (Annual)  Completed    Pneumococcal Vaccine: 50+ Years  Completed    Meningococcal B Vaccine  Aged Out         Meds This Visit:  Requested Prescriptions     Signed Prescriptions Disp Refills    cefuroxime 250 MG Oral Tab 20 tablet 0     Sig: Take 1 tablet (250 mg total) by mouth 2 (two) times daily.    HYDROcodone-acetaminophen 5-325 MG Oral Tab 20 tablet 0     Sig: Take 1 tablet by mouth every 6 (six) hours as needed for Pain.       Imaging & Referrals:  None     Patient understands plan and follow-up.  Follow up in 2 days for recheck on the cellulitis.    6/10/2025  Shahla Lundberg DO    This dictation was performed with a verbal recognition program (DRAGON) and it was checked for errors. It is possible that there are still dictated errors within this office note. If so, please bring any errors to my attention for an addendum. All efforts were made to ensure that this office note is accurate           [1]   Past Medical History:   ALCOHOL USE    Anemia    Anxiety    Atherosclerosis of coronary artery    Chronic coronary artery disease    Congestive heart  disease (HCC)    Depression    Sometimes    Dysfunction of eustachian tube    Essential hypertension    Gastroesophageal reflux disease    Hyperlipidemia    Hypertension    Hypogonadism in male    Description: Per Dr. Byrne     Hypothyroidism    Non-Hodgkin's lymphoma (HCC)    Osteoarthritis   [2]   Past Surgical History:  Procedure Laterality Date    Angioplasty (coronary)      Bowel resection      Cath angio  2025    Colectomy      Colonoscopy      Hernia surgery      Other surgical history      Rotator cuff repair Right     Skin surgery      Tonsillectomy      Vasectomy     [3]   Family History  Problem Relation Age of Onset    Alcohol and Other Disorders Associated Other     High Blood Pressure Other     Diabetes Other     Heart Disease Other     Dementia Mother     Heart Disorder Mother     Dementia Father     Heart Disorder Father     Heart Disorder Brother     Heart Disorder Brother    [4]   Social History  Socioeconomic History    Marital status:    Tobacco Use    Smoking status: Former     Current packs/day: 0.00     Average packs/day: 2.0 packs/day for 25.0 years (50.0 ttl pk-yrs)     Types: Cigarettes     Quit date: 1988     Years since quittin.2    Smokeless tobacco: Never   Vaping Use    Vaping status: Never Used   Substance and Sexual Activity    Alcohol use: Yes     Comment: couple beers a day but nothing since [facility]    Drug use: No   Other Topics Concern    Caffeine Concern Yes     Comment: Coffee     Social Drivers of Health     Food Insecurity: No Food Insecurity (2025)    NCSS - Food Insecurity     Worried About Running Out of Food in the Last Year: No     Ran Out of Food in the Last Year: No   Transportation Needs: No Transportation Needs (2025)    NCSS - Transportation     Lack of Transportation: No   Housing Stability: Not At Risk (2025)    NCSS - Housing/Utilities     Has Housing: Yes     Worried About Losing Housing: No     Unable to Get Utilities: No    [5]   Current Outpatient Medications   Medication Sig Dispense Refill    cefuroxime 250 MG Oral Tab Take 1 tablet (250 mg total) by mouth 2 (two) times daily. 20 tablet 0    HYDROcodone-acetaminophen 5-325 MG Oral Tab Take 1 tablet by mouth every 6 (six) hours as needed for Pain. 20 tablet 0    Gabapentin 100 MG Oral Tab Take 1 tablet (100 mg total) by mouth 2 (two) times daily. 60 tablet 11    ketoconazole 2 % External Shampoo Apply topically.      clobetasol 0.05 % External Solution Apply topically.      clobetasol 0.05 % External Cream Apply topically.      Cholecalciferol (VITAMIN D) 50 MCG (2000 UT) Oral Cap Take 1 capsule (2,000 Units total) by mouth in the morning.      vitamin E 400 UNITS Oral Cap Take 1 capsule (400 Units total) by mouth in the morning.      amLODIPine 5 MG Oral Tab Take 0.5 tablets (2.5 mg total) by mouth daily.      atenolol 25 MG Oral Tab Take 1 tablet (25 mg total) by mouth daily.      atorvastatin 10 MG Oral Tab Take 1 tablet (10 mg total) by mouth nightly.      tamsulosin 0.4 MG Oral Cap Take 1 capsule (0.4 mg total) by mouth daily.      levothyroxine 125 MCG Oral Tab Take 1 tablet (125 mcg total) by mouth before breakfast.  0    triamcinolone acetonide 0.025 % External Cream Apply topically in the morning and before bedtime.      aspirin 81 MG Oral Tab Take 1 tablet (81 mg total) by mouth every other day.      Bioflavonoid Products (DESIRAE C OR) Take 500 mg by mouth in the morning.      Multiple Vitamins-Minerals (MULTIVITAMIN OR) Take by mouth in the morning.      pantoprazole 40 MG Oral Tab EC Take 1 tablet (40 mg total) by mouth in the morning and 1 tablet (40 mg total) before bedtime.     [6]   Allergies  Allergen Reactions    Celecoxib RASH

## 2025-06-10 NOTE — PATIENT INSTRUCTIONS
You received a Rocephin injection today which is an antibiotic which will be effective for 24 hours.    Start the Ceftin 250 mg one tablet with breakfast and dinner tomorrow for the next 10 days.    Apply cool compresses 5-10 minutes several times a day to help decrease the swelling.    Keep the left hand elevated.    Take the Norco 5/325 one tablet every 6 hours as needed for severe pain. Do not operate machinery or drive or consume alcohol while taking this medication.    Go to the ER if you develop fever, chills, increasing redness, pain or swelling to the left hand or if you have increased redness streaking up the left arm.    See me on Thursday 06/12/2025 at 7:45 am for recheck on the cellulitis.

## 2025-06-10 NOTE — TELEPHONE ENCOUNTER
James is calling about these prescriptions:    cefuroxime 250 MG Oral Tab [117304]   HYDROcodone-acetaminophen 5-325 MG Oral Tab     Dr. Shahla Lundberg prescribed them today. He said the pharmacy does not have them. Please advise.     [facility] [address] [place] [address] [phone], [phone] [address] [facility] [id number]   Phone: [phone] Fax: [phone]   Hours: Not open 24 hours

## 2025-06-12 NOTE — PATIENT INSTRUCTIONS
Start the Prednisone 20 mg 2 tablets once daily with lunch for 5 days today. Take your prednisone with a full meal and early in the day.  Do not take any Ibuprofen, Advil, Motrin, Naproxen, Aspirin while on Prednisone. Tylenol is OK for fever or pain.    Continue with your antibiotics as prescribed.    Elevated the left hand. You can use a sling during the day but take it off for sleeping.    Go to the ER if you develop fever, chills, increasing redness,swelling, pain of the hand or red streaking up the arm.    See me Monday 6/16/2025 at 7:45 am if not better. If you are improving, you can send a [employer] message to let me know.

## 2025-06-12 NOTE — PROGRESS NOTES
The 21st Century Cures Act makes medical notes like these available to patients in the interest of transparency. Please be advised this is a medical document. Medical documents are intended to carry relevant information, facts as evident, and the clinical opinion of the practitioner. The medical note is intended as peer to peer communication and may appear blunt or direct. It is written in medical language and may contain abbreviations or verbiage that are unfamiliar.       James Roberts is a 79 year old male.    HPI:     Chief Complaint   Patient presents with    Follow - Up       This 79-year-old male presents to the office for follow-up on cellulitis to the left hand.  Since I had seen him on 6/10/2025, he has had no fever or chills.  The redness to the hand has improved some.  He has noticed a little bit of redness to the wrist but there has been no red streaking up the arm.  He continues to have a lot of pain.  He has difficulty flexing the fingers and making a fist.  He is asking if he can use a sling.  He had received a dose of Rocephin 1 g while in the office and is now taking cefuroxime 250 mg twice daily.  He states he has needed to use Norco this morning for his pain.  He has had no new symptoms.    HISTORY:  Past Medical History[1]   Past Surgical History[2]   Family History[3]   Social History: Short Social Hx on File[4]     Medications (Active prior to today's visit):  Current Medications[5]    Allergies:  Allergies[6]    ROS:     Pertinent positives and pertinent negatives are as listed in HPI.    All other review of symptoms were reviewed and negative.    PHYSICAL EXAM:   /68 (BP Location: Left arm, Patient Position: Sitting, Cuff Size: adult)   Pulse 73   Temp 98.2 °F (36.8 °C)   Resp 18   Ht 5' 8\" (1.727 m)   Wt 157 lb (71.2 kg)   SpO2 98%   BMI 23.87 kg/m²     Wt Readings from Last 3 Encounters:   06/12/25 157 lb (71.2 kg)   06/10/25 157 lb (71.2 kg)   05/30/25 158 lb (71.7  kg)       BP Readings from Last 3 Encounters:   06/12/25 124/68   06/10/25 116/54   05/30/25 110/54       General: Well-nourished, well hydrated.  In severe discomfort, keeping the left hand elevated due to pain.  No pallor.   HEENT: Normocephalic, atraumatic.  Sclera clear and non icteric bilaterally.   Extremities: No leg edema bilaterally.  The right hand still shows swelling across all the MCP joints but the redness has resolved.  He has a little bit of redness on the volar aspect of the left wrist over the distal ulna.  He has pain with range of motion of all the fingers but is able to go through range of motion.  He has pain with range of motion of the wrist.  CMS is intact to the left hand.  He has full range of motion of the left elbow.  There are no epitrochlear lymph nodes palpated.  Skin: Warm and dry.  No evidence for lymphangitis.  Neuro: Alert and oriented x 3.normal gait.  Psych: Normal mood and affect.     ASSESSMENT/PLAN:   79 year old male with      1. Cellulitis of other specified site    I told the patient I will start him on prednisone 40 mg once daily with lunch for 5 days.  Usage and side effects were reviewed.  He should finish off his cefuroxime 250 mg twice daily as prescribed.  He may continue with his [place] as needed for pain.  He should continue to use cool compresses and elevate the hand.  I told him he can use a sling during the daytime for comfort but not to sleep with it.  The patient was again instructed to go to the emergency room if he develops fever, chills, worsening redness, pain or swelling to the left hand or red streaking up the arm.  He will follow-up with me on Monday, 6/16/2025 if not improving.    Other orders  - predniSONE 20 MG Oral Tab; Take 2 tablets once daily with lunch for 5 days.  Dispense: 10 tablet; Refill: 0       Health Maintenance:    Health Maintenance   Topic Date Due    Annual Physical  Never done    Colorectal Cancer Screening  09/15/2018    COVID-19  Vaccine (8 - 2024-25 season) 04/29/2025    Influenza Vaccine  Completed    Annual Depression Screening  Completed    Fall Risk Screening (Annual)  Completed    Pneumococcal Vaccine: 50+ Years  Completed    Meningococcal B Vaccine  Aged Out         Meds This Visit:  Requested Prescriptions     Signed Prescriptions Disp Refills    predniSONE 20 MG Oral Tab 10 tablet 0     Sig: Take 2 tablets once daily with lunch for 5 days.       Imaging & Referrals:  None     Patient understands plan and follow-up.  Follow up on 6/16/2025 if not improving.    6/12/2025  Shahla Lundberg DO    Total time: 20 minutes including precharting, H&P, plan of care    This dictation was performed with a verbal recognition program (DRAGON) and it was checked for errors. It is possible that there are still dictated errors within this office note. If so, please bring any errors to my attention for an addendum. All efforts were made to ensure that this office note is accurate         [1]   Past Medical History:   ALCOHOL USE    Anemia    Anxiety    Atherosclerosis of coronary artery    Chronic coronary artery disease    Congestive heart disease (HCC)    Depression    Sometimes    Dysfunction of eustachian tube    Essential hypertension    Gastroesophageal reflux disease    Hyperlipidemia    Hypertension    Hypogonadism in male    Description: Per Dr. Byrne     Hypothyroidism    Non-Hodgkin's lymphoma (HCC)    Osteoarthritis   [2]   Past Surgical History:  Procedure Laterality Date    Angioplasty (coronary)      Bowel resection      Cath angio  05/01/2025    Colectomy      Colonoscopy      Hernia surgery      Other surgical history      Rotator cuff repair Right     Skin surgery      Tonsillectomy      Vasectomy     [3]   Family History  Problem Relation Age of Onset    Alcohol and Other Disorders Associated Other     High Blood Pressure Other     Diabetes Other     Heart Disease Other     Dementia Mother     Heart Disorder Mother     Dementia  Father     Heart Disorder Father     Heart Disorder Brother     Heart Disorder Brother    [4]   Social History  Socioeconomic History    Marital status:    Tobacco Use    Smoking status: Former     Current packs/day: 0.00     Average packs/day: 2.0 packs/day for 25.0 years (50.0 ttl pk-yrs)     Types: Cigarettes     Quit date: 1988     Years since quittin.2    Smokeless tobacco: Never   Vaping Use    Vaping status: Never Used   Substance and Sexual Activity    Alcohol use: Yes     Comment: couple beers a day but nothing since [facility]    Drug use: No   Other Topics Concern    Caffeine Concern Yes     Comment: Coffee     Social Drivers of Health     Food Insecurity: No Food Insecurity (2025)    NCSS - Food Insecurity     Worried About Running Out of Food in the Last Year: No     Ran Out of Food in the Last Year: No   Transportation Needs: No Transportation Needs (2025)    NCSS - Transportation     Lack of Transportation: No   Housing Stability: Not At Risk (2025)    NCSS - Housing/Utilities     Has Housing: Yes     Worried About Losing Housing: No     Unable to Get Utilities: No   [5]   Current Outpatient Medications   Medication Sig Dispense Refill    predniSONE 20 MG Oral Tab Take 2 tablets once daily with lunch for 5 days. 10 tablet 0    cefuroxime 250 MG Oral Tab Take 1 tablet (250 mg total) by mouth 2 (two) times daily. 20 tablet 0    HYDROcodone-acetaminophen 5-325 MG Oral Tab Take 1 tablet by mouth every 6 (six) hours as needed for Pain. 20 tablet 0    Gabapentin 100 MG Oral Tab Take 1 tablet (100 mg total) by mouth 2 (two) times daily. 60 tablet 11    ketoconazole 2 % External Shampoo Apply topically.      clobetasol 0.05 % External Solution Apply topically.      clobetasol 0.05 % External Cream Apply topically.      Cholecalciferol (VITAMIN D) 50 MCG (2000 UT) Oral Cap Take 1 capsule (2,000 Units total) by mouth in the morning.      vitamin E 400 UNITS Oral Cap Take 1 capsule  (400 Units total) by mouth in the morning.      amLODIPine 5 MG Oral Tab Take 0.5 tablets (2.5 mg total) by mouth daily.      atenolol 25 MG Oral Tab Take 1 tablet (25 mg total) by mouth daily.      atorvastatin 10 MG Oral Tab Take 1 tablet (10 mg total) by mouth nightly.      tamsulosin 0.4 MG Oral Cap Take 1 capsule (0.4 mg total) by mouth daily.      levothyroxine 125 MCG Oral Tab Take 1 tablet (125 mcg total) by mouth before breakfast.  0    triamcinolone acetonide 0.025 % External Cream Apply topically in the morning and before bedtime.      aspirin 81 MG Oral Tab Take 1 tablet (81 mg total) by mouth every other day.      Bioflavonoid Products (DESIRAE C OR) Take 500 mg by mouth in the morning.      Multiple Vitamins-Minerals (MULTIVITAMIN OR) Take by mouth in the morning.      pantoprazole 40 MG Oral Tab EC Take 1 tablet (40 mg total) by mouth in the morning and 1 tablet (40 mg total) before bedtime.     [6]   Allergies  Allergen Reactions    Celecoxib RASH

## 2025-06-13 NOTE — PROGRESS NOTES
Pulmonary Consult Note    History of Present Illness:  James Roberts is a 79 year old male presenting to pulmonary clinic today for pl effusion   Ex-smoker x 37 years  Was seen in the hospital for bilateral effusions dyspnea on exertion admitted with severe neck pain--cardiology did left heart cath unremarkable-thoracentesis with improvement in dyspnea that was short-lived  Ongoing neck pain - pain in head and neck -- to see neurologist   Sinus is clogged - hx sinus netipot - helped - can't bend head to use netipot -   Dyspnea -- ongoing issues since had cancer 2004-- thinks back and has been short of breath since - can't catch his breath - same as 20 yrs ago-- thinks alittle worse since can't be active - losing weight 4 weeks - moving and packing -   Got better after tap -   Not doing anything strenous -- walking to 150 ft too much - up ramp here - not too bad- would have plan steps etc -- even 10 yrs ago unable to take care of grass 8 yrs ago -  Saw pulmonary in [place] - did not get along with - told nothing wrong -   New from 2022 by CT abd   6/25 - some exercise and to start PT--   Had cellulits left fore arm - last Friday one week ago -treated -- now resolved - given prednsione for pain in wrist and thumb and hand-- was unable to open hand - no fevers ? Chills -  Some coughing at night -- lated dinner with gerd   Breathing is better-- walking in here - out of breath by the elevator - hard to walk and talk -- no exercise   No cp       Past Medical History:    Chronic coronary artery disease    Dysfunction of eustachian tube    Gastroesophageal reflux disease    Hyperlipidemia    Hypertension    Hypogonadism in male     Description: Per Dr. Byrne     Hypothyroidism    Non-Hodgkin's lymphoma (HCC)    Osteoarthritis      CAD angioplasty   Denies history of sleep disorder  Questionable concerns for myelodysplastic syndrome and COPD [facility]    Past Surgical History: Past Surgical  History[1]    Family Medical History: Family History[2]    Social History: Social Hx on file[3]    Allergies: Celecoxib     Medications: Current Medications[4]    Review of Systems: Weight remains stable  Denies any specific vision changes  Denies any sore throat difficulty swallowing or aspiration tendencies  Dyspnea remains about the same  Denies any chest pain or palpitations  Denies any nausea vomiting or diarrhea with no prior history of GERD  Rash seems decreased some  Denies any history of sleep disorder states never told he needed study    Physical Exam:  /60   Pulse 78   Resp 16   Ht 5' 8\" (1.727 m)   Wt 159 lb (72.1 kg)   SpO2 98%   BMI 24.18 kg/m²    Constitutional: comfortable . No acute distress.   HEENT: Head NC/AT. PEERL. Throat is clear small area   Cardio: Regular rate and rhythm. Normal S1 and S2. No murmurs. No ectopy   Respiratory: Thorax symmetrical with no labored breathing. . No wheeze - pavel rales approximately 1/8 up-- no dullness --less rales today  Posterior chest wall with fluid questionable fat density believes an area of prior thoracentesis no erythema lesion is fluctuant nontender  GI:  Abd soft, non-tender.  Extremities: No clubbing or cyanosis. No LE edema. No calf tenderness.no edema   Neurologic: A&Ox3. No gross motor deficits.  Skin: Warm, dry.no rashes or hives noted   Lymphatic: No cervical or supraclavicular lymphadenopathy.neck very stiff   Psych: Calm, cooperative. Pleasant affect.    Results:  Reviewed     Assessment/Plan:  # Dyspnea with mild hypoxia-etiology to be determined however evidence of fairly rapidly advancing bilateral pleural effusions with the etiology to be determined.  Concerns could include constrictive physiology-- neg 6-minute walk in the hospital  5/25 thinks his breathing got a bit better following tap but was short-lived--- reports the same as it has been in the past 10 years  6/25 better-- less dyspnea though reports on going limitation to  walking and talking - - thinks related environment- - about the same -- dypsnea and fatgiue - the same as 15 yrs ago ... Walking into Dr Matthews office   Discussed role of sleep apnea with diastolic heart failure    # Bilateral pleural effusions right greater than left-status post tap with exudate sterile cytology remains pending   6/25 all negative and no recurrance at all on recent PET etiology not entirely clear questionably related to chronic systolic/pulmonary hypertension with plans to follow-due to see Dr. Jeffries      #History of Hodgkin's 2004 and non-Hodgkin's lymphoma  Dr. 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  #Severe neck pain back pain thought related to moving-symptoms somewhat improved though remains a chief complaint  5/25 overall better  6/25 for MRI neck with dr marshall some areas of uptake noted at  pet     # sinus hx-- had sinus surgery years ago- had used flonase in the past - congested       # Risk of TERRIE  We will check overnight    # PET positive chest wall lesion-  Evidence of questionable lipoma as far back as 2022  Unable to assess PET from 2024  No areas of PET positive questionably related to thoracentesis-clinically things mild pain has improved      Plan:  to check overnight oximetery              - will correct PFTS              -- call with any changes on back               - to see me in 2 months    -    Eboni Rhoades MD  Pulmonary Medicine  6/13/2025          [1]   Past Surgical History:  Procedure Laterality Date    Angioplasty (coronary)      Bowel resection      Cath angio  05/01/2025    Colectomy      Colonoscopy      Hernia surgery      Other surgical history      Rotator cuff repair Right     Skin surgery      Tonsillectomy      Vasectomy     [2]   Family History  Problem Relation Age of Onset    Alcohol and Other Disorders Associated  Other     High Blood Pressure Other     Diabetes Other     Heart Disease Other     Dementia Mother     Heart Disorder Mother     Dementia Father     Heart Disorder Father     Heart Disorder Brother     Heart Disorder Brother    [3]   Social History  Socioeconomic History    Marital status:    Tobacco Use    Smoking status: Former     Current packs/day: 0.00     Average packs/day: 2.0 packs/day for 25.0 years (50.0 ttl pk-yrs)     Types: Cigarettes     Quit date: 1988     Years since quittin.2    Smokeless tobacco: Never   Vaping Use    Vaping status: Never Used   Substance and Sexual Activity    Alcohol use: Yes     Comment: couple beers a day but nothing since     Drug use: No   Other Topics Concern    Caffeine Concern Yes     Comment: Coffee   [4]   Current Outpatient Medications   Medication Sig Dispense Refill    Cephalexin 500 MG Oral Tab Take by mouth 2 (two) times daily.      predniSONE 20 MG Oral Tab Take 2 tablets once daily with lunch for 5 days. 10 tablet 0    cefuroxime 250 MG Oral Tab Take 1 tablet (250 mg total) by mouth 2 (two) times daily. 20 tablet 0    HYDROcodone-acetaminophen 5-325 MG Oral Tab Take 1 tablet by mouth every 6 (six) hours as needed for Pain. 20 tablet 0    Gabapentin 100 MG Oral Tab Take 1 tablet (100 mg total) by mouth 2 (two) times daily. 60 tablet 11    ketoconazole 2 % External Shampoo Apply topically.      clobetasol 0.05 % External Solution Apply topically.      clobetasol 0.05 % External Cream Apply topically.      Cholecalciferol (VITAMIN D) 50 MCG (2000 UT) Oral Cap Take 1 capsule (2,000 Units total) by mouth in the morning.      vitamin E 400 UNITS Oral Cap Take 1 capsule (400 Units total) by mouth in the morning.      amLODIPine 5 MG Oral Tab Take 0.5 tablets (2.5 mg total) by mouth daily.      atenolol 25 MG Oral Tab Take 1 tablet (25 mg total) by mouth daily.      atorvastatin 10 MG Oral Tab Take 1 tablet (10 mg total) by mouth nightly.       tamsulosin 0.4 MG Oral Cap Take 1 capsule (0.4 mg total) by mouth daily.      levothyroxine 125 MCG Oral Tab Take 1 tablet (125 mcg total) by mouth before breakfast.  0    triamcinolone acetonide 0.025 % External Cream Apply topically in the morning and before bedtime.      aspirin 81 MG Oral Tab Take 1 tablet (81 mg total) by mouth every other day.      Bioflavonoid Products (DESIRAE C OR) Take 500 mg by mouth in the morning.      Multiple Vitamins-Minerals (MULTIVITAMIN OR) Take by mouth in the morning.      pantoprazole 40 MG Oral Tab EC Take 1 tablet (40 mg total) by mouth in the morning and 1 tablet (40 mg total) before bedtime.

## 2025-06-13 NOTE — PATIENT INSTRUCTIONS
Plan:  to check overnight oximetery              - will correct PFTS              -- call with any changes on back               - to see me in 2 months    -    Eboni Rhoades MD  Pulmonary Medicine  6/13/2025

## 2025-06-16 NOTE — TELEPHONE ENCOUNTER
[facility] calling in regards to James Roberts regarding predniSONE 20 MG Oral Tab  . Requesting to speak to a nurse clarify rx - is it 5 days or 3 days worth of medication.

## 2025-06-16 NOTE — PATIENT INSTRUCTIONS
Continue the Prednisone 20 mg 2 tablets daily for an additional 3 days.    Finish the antibiotics.    Follow up for any new or worsening symptoms.

## 2025-06-16 NOTE — PROGRESS NOTES
The 21st Century Cures Act makes medical notes like these available to patients in the interest of transparency. Please be advised this is a medical document. Medical documents are intended to carry relevant information, facts as evident, and the clinical opinion of the practitioner. The medical note is intended as peer to peer communication and may appear blunt or direct. It is written in medical language and may contain abbreviations or verbiage that are unfamiliar.       James Roberts is a 79 year old male.    HPI:     Chief Complaint   Patient presents with    Follow - Up       This 79 year old male presents to the office for follow up on his cellulitis of the left hand. I had last seen him on 6/12/2025 and had added Prednisone 40 mg once daily for 5 days to his cefuroxime.  Since the last visit, the patient denies any fever, chills, red streaking up the arm or worsening redness to the hand. He states over the weekend, he was using the hand more frequently and today it is sore but it has improved. He has had no new symptoms.    HISTORY:  Past Medical History[1]   Past Surgical History[2]   Family History[3]   Social History: Short Social Hx on File[4]     Medications (Active prior to today's visit):  Current Medications[5]    Allergies:  Allergies[6]    ROS:     Pertinent positives and pertinent negatives are as listed in HPI.    All other review of symptoms were reviewed and negative.    PHYSICAL EXAM:   /58 (BP Location: Left arm, Patient Position: Sitting, Cuff Size: adult)   Pulse 60   Temp 98 °F (36.7 °C)   Resp 18   Ht 5' 8\" (1.727 m)   Wt 159 lb (72.1 kg)   SpO2 100%   BMI 24.18 kg/m²     Wt Readings from Last 3 Encounters:   06/16/25 159 lb (72.1 kg)   06/13/25 159 lb (72.1 kg)   06/12/25 157 lb (71.2 kg)       BP Readings from Last 3 Encounters:   06/16/25 138/58   06/13/25 122/60   06/12/25 124/68       General: Well-nourished, well hydrated. No acute distress. No pallor.   HEENT:  Normocephalic, atraumatic.  JENNIFER, EOMI, Sclera clear and non icteric bilaterally. TM's normal, nose without congestion, pharynx without redness or exudates. Moist mucous membranes.  Neck: Supple. No lymphadenopathy. No thyromegaly. No bruits noted.  Heart: RRR without S3 or S4 or murmur.  Lungs: Clear to auscultation bilaterally. No rales, rhonchi or wheezes. No tachypnea Extremities: The redness and swelling to the left hand and wrist has resolved. Normal ROM of the fingers and wrist. No epitrochlear lymph nodes palpated.  Skin: Warm and dry. Multiple resolving areas of ecchymosis noted. No evidence of lymphangitis.  Neuro: Alert and oriented x 3.normal gait.  Psych: Normal mood and affect.     ASSESSMENT/PLAN:   79 year old male with        1. Cellulitis of other specified site    Patient's symptoms have improved. He should finish his cefuroxime and have extended the Prednisone 40 mg for an additional 3 days. He should follow up for any new or worsening symptoms.    Other orders  - predniSONE 20 MG Oral Tab; Take 2 tablets once daily with breakfast for 5 days.  Dispense: 6 tablet; Refill: 0     2. Health maintenance    Due for Medicare annual wellness exam in February.    Health Maintenance:    Health Maintenance   Topic Date Due    Annual Physical  Never done    Colorectal Cancer Screening  09/15/2018    COVID-19 Vaccine (8 - 2024-25 season) 04/29/2025    Influenza Vaccine  Completed    Annual Depression Screening  Completed    Fall Risk Screening (Annual)  Completed    Pneumococcal Vaccine: 50+ Years  Completed    Meningococcal B Vaccine  Aged Out         Meds This Visit:  Requested Prescriptions     Signed Prescriptions Disp Refills    predniSONE 20 MG Oral Tab 6 tablet 0     Sig: Take 2 tablets once daily with breakfast for 5 days.       Imaging & Referrals:  None     Patient understands plan and follow-up.  Follow up prn or 2/26 for MAW.    6/16/2025  Shahla Lundberg DO    Total time: 20 minutes including  precharting, H&P, plan of care    This dictation was performed with a verbal recognition program (DRAGON) and it was checked for errors. It is possible that there are still dictated errors within this office note. If so, please bring any errors to my attention for an addendum. All efforts were made to ensure that this office note is accurate           [1]   Past Medical History:   ALCOHOL USE    Anemia    Anxiety    Atherosclerosis of coronary artery    Chronic coronary artery disease    Congestive heart disease (HCC)    Depression    Sometimes    Dysfunction of eustachian tube    Essential hypertension    Gastroesophageal reflux disease    Hyperlipidemia    Hypertension    Hypogonadism in male    Description: Per Dr. Byrne     Hypothyroidism    Non-Hodgkin's lymphoma (HCC)    Osteoarthritis   [2]   Past Surgical History:  Procedure Laterality Date    Angioplasty (coronary)      Bowel resection      Cath angio  2025    Colectomy      Colonoscopy      Hernia surgery      Other surgical history      Rotator cuff repair Right     Skin surgery      Tonsillectomy      Vasectomy     [3]   Family History  Problem Relation Age of Onset    Alcohol and Other Disorders Associated Other     High Blood Pressure Other     Diabetes Other     Heart Disease Other     Dementia Mother     Heart Disorder Mother     Dementia Father     Heart Disorder Father     Heart Disorder Brother     Heart Disorder Brother    [4]   Social History  Socioeconomic History    Marital status:    Tobacco Use    Smoking status: Former     Current packs/day: 0.00     Average packs/day: 2.0 packs/day for 25.0 years (50.0 ttl pk-yrs)     Types: Cigarettes     Quit date: 1988     Years since quittin.2    Smokeless tobacco: Never   Vaping Use    Vaping status: Never Used   Substance and Sexual Activity    Alcohol use: Yes     Comment: couple beers a day but nothing since     Drug use: No   Other Topics Concern    Caffeine Concern  Yes     Comment: Coffee     Social Drivers of Health     Food Insecurity: No Food Insecurity (4/29/2025)    NCSS - Food Insecurity     Worried About Running Out of Food in the Last Year: No     Ran Out of Food in the Last Year: No   Transportation Needs: No Transportation Needs (4/29/2025)    NCSS - Transportation     Lack of Transportation: No   Housing Stability: Not At Risk (4/29/2025)    NCSS - Housing/Utilities     Has Housing: Yes     Worried About Losing Housing: No     Unable to Get Utilities: No   [5]   Current Outpatient Medications   Medication Sig Dispense Refill    predniSONE 20 MG Oral Tab Take 2 tablets once daily with breakfast for 5 days. 6 tablet 0    predniSONE 20 MG Oral Tab Take 2 tablets once daily with lunch for 5 days. 10 tablet 0    cefuroxime 250 MG Oral Tab Take 1 tablet (250 mg total) by mouth 2 (two) times daily. 20 tablet 0    HYDROcodone-acetaminophen 5-325 MG Oral Tab Take 1 tablet by mouth every 6 (six) hours as needed for Pain. 20 tablet 0    Gabapentin 100 MG Oral Tab Take 1 tablet (100 mg total) by mouth 2 (two) times daily. 60 tablet 11    ketoconazole 2 % External Shampoo Apply topically.      clobetasol 0.05 % External Solution Apply topically.      clobetasol 0.05 % External Cream Apply topically.      Cholecalciferol (VITAMIN D) 50 MCG (2000 UT) Oral Cap Take 1 capsule (2,000 Units total) by mouth in the morning.      vitamin E 400 UNITS Oral Cap Take 1 capsule (400 Units total) by mouth in the morning.      amLODIPine 5 MG Oral Tab Take 0.5 tablets (2.5 mg total) by mouth daily.      atenolol 25 MG Oral Tab Take 1 tablet (25 mg total) by mouth daily.      atorvastatin 10 MG Oral Tab Take 1 tablet (10 mg total) by mouth nightly.      tamsulosin 0.4 MG Oral Cap Take 1 capsule (0.4 mg total) by mouth daily.      levothyroxine 125 MCG Oral Tab Take 1 tablet (125 mcg total) by mouth before breakfast.  0    triamcinolone acetonide 0.025 % External Cream Apply topically in the  morning and before bedtime.      aspirin 81 MG Oral Tab Take 1 tablet (81 mg total) by mouth every other day.      Bioflavonoid Products (DESIRAE C OR) Take 500 mg by mouth in the morning.      Multiple Vitamins-Minerals (MULTIVITAMIN OR) Take by mouth in the morning.      pantoprazole 40 MG Oral Tab EC Take 1 tablet (40 mg total) by mouth in the morning and 1 tablet (40 mg total) before bedtime.     [6]   Allergies  Allergen Reactions    Celecoxib RASH      infant/actual

## 2025-06-24 NOTE — PROGRESS NOTES
The following individual(s) verbally consented to be recorded using ambient AI listening technology and understand that they can each withdraw their consent to this listening technology at any point by asking the clinician to turn off or pause the recording:    Patient name: James Roberts  Additional names:

## 2025-06-24 NOTE — PROGRESS NOTES
[facility] Family Medicine Office Note  Chief Complaint:   Chief Complaint   Patient presents with    Hernia     Pt states he believes he has a hernia    Cellulitis     follow-up on cellulitis to the left hand, pt states it flared up       HPI:     History of Present Illness  James Roberts is a 79 year old male who presents with a recurrence of cellulitis symptoms and to discuss his inguinal hernia.    He has been seeing his PCP Dr. Lundberg for the cellulitis and receiving treatment with last being seen on 6/16/25 in which he was recommended to finish his cefuroxime dose and prednisone 50mg.  James Roberts  states the cellulitis started initially when he experienced swelling from the left elbow to the hand, which impaired his ability to close his hand. He initially thought it was due to his sleeping position or arthritis. He was treated with an antibiotic and a Rochephin injection, which initially reduced the swelling. He completed a course of prednisone, but now has significant pain in the thumb area and the bottom of the wrist, describing it as 'off the charts' and 'hurts like hell'.    He was hospitalized at the end of April for four days and again shortly after for five days, during which he lost a significant amount of weight. An inguinal hernia was noted during hospitalization, but it was not the primary reason for admission. He has no pain or discomfort from the hernia but is aware of its presence. He has a history of a similar hernia on the opposite side 20-30 years ago.    His current medications include Tylenol for pain, which provides minimal relief. He has tried extra strength Tylenol (650 mg) without significant improvement. He cannot take ibuprofen.    Denies any scrotal involvement with the hernia.        Past Medical History[1]  Past Surgical History[2]  Social History:  Short Social Hx on File[3]  Family History:  Family History[4]  Allergies:  Allergies[5]  Current Meds:  Current  Medications[6]   Counseling given: Not Answered       REVIEW OF SYSTEMS:   Review of Systems   Constitutional:  Negative for chills and fever.   Genitourinary:         Hernia right sided groin   Musculoskeletal:  Positive for arthralgias. Negative for joint swelling.   Skin:  Positive for color change.        EXAM:   /60   Pulse 76   Resp 16   Ht 5' 8\" (1.727 m)   Wt 158 lb (71.7 kg)   SpO2 96%   BMI 24.02 kg/m²  Estimated body mass index is 24.02 kg/m² as calculated from the following:    Height as of this encounter: 5' 8\" (1.727 m).    Weight as of this encounter: 158 lb (71.7 kg).   Vital signs reviewed.Appears stated age, well groomed.  Physical Exam  Constitutional:       General: He is not in acute distress.     Appearance: Normal appearance.   HENT:      Head: Normocephalic and atraumatic.   Cardiovascular:      Rate and Rhythm: Normal rate.   Pulmonary:      Effort: Pulmonary effort is normal.   Genitourinary:     Comments: Right Inguinal hernia present. No skin color changes No signs of strangulation, incarcination, gangrene. No scrotal involvement of hernia.  Musculoskeletal:      Comments: No swelling or restricted ROM of the left hand/fingers   Skin:     Findings: Erythema (erythema of skin around the medial left wrist.) present.   Neurological:      Mental Status: He is alert and oriented to person, place, and time.   Psychiatric:         Mood and Affect: Mood normal.         Behavior: Behavior normal.         Thought Content: Thought content normal.         Judgment: Judgment normal.          ASSESSMENT AND PLAN:   1. Cellulitis of other specified site  Recurrent cellulitis in left arm, treated previously with cefuroxime mean and prednisone.  Symptoms are returning with severe pain in the medial wrist left area.  Will restart antibiotic, this time cephalexin 500 mg twice daily for 7 days.  Reviewed previous PCP notes regarding the cellulitis.  Patient was first seen on 6/10/2025 in which he  was given a gram of Rocephin IM while in office and prescribed cefuroxime 250 mg twice a day for 10 days.  Was also prescribed Norco 5/325 1 tablet every 6 hours as needed for severe pain.  He was then seen as a follow-up on 6/12/2025 in which the redness improved, patient expressed pain and difficulty flexing fingers or making a fist.  He was started on prednisone 40 mg once daily with lunch for 5 days and encouraged to finish his cefuroxime 250 mg prescription.  He then follow-up again on 6/16/2025 in which he was recommended continue prednisone 20 mg 2 tablets daily for additional 3 days.  Today, he was recommended to begin taking the cephalexin 500 mg twice daily x 7-day prescription and follow-up in 3 to 4 days with me or his PCP to reassess his cellulitis and see if there is any improvement.  We will hold off on a prednisone prescription for now given he just finished multiple prednisone prescriptions recently. Pt prefers to utilize tylenol for pain for now.  He was advised to watch out for any increasing signs of infection such as streaking of the redness up his arm, fever or chills.  He advised to go to ER if any symptoms occur.  - cephALEXin 500 MG Oral Cap; Take 1 capsule (500 mg total) by mouth 2 (two) times daily for 7 days.  Dispense: 14 capsule; Refill: 0    2. Unilateral inguinal hernia without obstruction or gangrene, recurrence not specified  Obvious right-sided inguinal hernia noted on physical exam today.  Previously diagnosed during hospital admission in April 2025.  Provided general surgery referral for evaluation.  Advised monitoring of signs of strangulation or incarceration and seek emergent care if these occur  - OP REFERRAL TO GENERAL SURGERY        Meds, Orders, & Refills for this Visit:  Requested Prescriptions     Signed Prescriptions Disp Refills    cephALEXin 500 MG Oral Cap 14 capsule 0     Sig: Take 1 capsule (500 mg total) by mouth 2 (two) times daily for 7 days.         Imaging &  Consults:  OP REFERRAL TO GENERAL SURGERY  Orders Placed This Encounter   Procedures    OP REFERRAL TO GENERAL SURGERY     Referred to Provider:   Negrito Sauer MD         Health Maintenance:  Health Maintenance Due   Topic Date Due    Annual Physical  Never done    Colorectal Cancer Screening  09/15/2018    COVID-19 Vaccine (8 - 2024-25 season) 04/29/2025         Problem List:  Problem List[7]      Patient/Caregiver Education: Patient/Caregiver Education: There are no barriers to learning. Medical education done.   Outcome: James Maciel Armando verbalizes understanding, agrees with the plan, and had no other questions at the end of today's visit. James Roberts is informed to call with any questions, complications, allergies, or worsening or changing symptoms.  James Roberts is to call with any side effects or complications from the treatments as a result of today.     Follow-up in   Return in about 3 days (around 6/27/2025).    Note to patient: The 21st Century Cures Act makes medical notes like these available to patients in the interest of transparency. However, this is a medical document intended as peer to peer communication. It is written in medical language and may contain abbreviations or verbiage that are unfamiliar. It may appear blunt or direct. Medical documents are intended to carry relevant information, facts as evident, and the clinical opinion of the practitioner.         [1]   Past Medical History:   ALCOHOL USE    Anemia    Anxiety    Atherosclerosis of coronary artery    Chronic coronary artery disease    Congestive heart disease (HCC)    Depression    Sometimes    Dysfunction of eustachian tube    Essential hypertension    Gastroesophageal reflux disease    Hyperlipidemia    Hypertension    Hypogonadism in male    Description: Per Dr. Byrne     Hypothyroidism    Non-Hodgkin's lymphoma (HCC)    Osteoarthritis   [2]   Past Surgical History:  Procedure Laterality Date     Angioplasty (coronary)      Bowel resection      Cath angio  2025    Colectomy      Colonoscopy      Hernia surgery      Other surgical history      Rotator cuff repair Right     Skin surgery      Tonsillectomy      Vasectomy     [3]   Social History  Socioeconomic History    Marital status:    Tobacco Use    Smoking status: Former     Current packs/day: 0.00     Average packs/day: 2.0 packs/day for 25.0 years (50.0 ttl pk-yrs)     Types: Cigarettes     Quit date: 1988     Years since quittin.2    Smokeless tobacco: Never   Vaping Use    Vaping status: Never Used   Substance and Sexual Activity    Alcohol use: Yes     Comment: couple beers a day but nothing since east    Drug use: No   Other Topics Concern    Caffeine Concern Yes     Comment: Coffee     Social Drivers of Health     Food Insecurity: No Food Insecurity (2025)    NCSS - Food Insecurity     Worried About Running Out of Food in the Last Year: No     Ran Out of Food in the Last Year: No   Transportation Needs: No Transportation Needs (2025)    NCSS - Transportation     Lack of Transportation: No   Housing Stability: Not At Risk (2025)    NCSS - Housing/Utilities     Has Housing: Yes     Worried About Losing Housing: No     Unable to Get Utilities: No   [4]   Family History  Problem Relation Age of Onset    Alcohol and Other Disorders Associated Other     High Blood Pressure Other     Diabetes Other     Heart Disease Other     Dementia Mother     Heart Disorder Mother     Dementia Father     Heart Disorder Father     Heart Disorder Brother     Heart Disorder Brother    [5]   Allergies  Allergen Reactions    Celecoxib RASH   [6]   Current Outpatient Medications   Medication Sig Dispense Refill    cephALEXin 500 MG Oral Cap Take 1 capsule (500 mg total) by mouth 2 (two) times daily for 7 days. 14 capsule 0    predniSONE 20 MG Oral Tab Take 2 tablets once daily with breakfast for 5 days. 6 tablet 0    predniSONE 20 MG  Oral Tab Take 2 tablets once daily with lunch for 5 days. 10 tablet 0    cefuroxime 250 MG Oral Tab Take 1 tablet (250 mg total) by mouth 2 (two) times daily. 20 tablet 0    HYDROcodone-acetaminophen 5-325 MG Oral Tab Take 1 tablet by mouth every 6 (six) hours as needed for Pain. 20 tablet 0    Gabapentin 100 MG Oral Tab Take 1 tablet (100 mg total) by mouth 2 (two) times daily. 60 tablet 11    ketoconazole 2 % External Shampoo Apply topically.      clobetasol 0.05 % External Solution Apply topically.      clobetasol 0.05 % External Cream Apply topically.      Cholecalciferol (VITAMIN D) 50 MCG (2000 UT) Oral Cap Take 1 capsule (2,000 Units total) by mouth in the morning.      vitamin E 400 UNITS Oral Cap Take 1 capsule (400 Units total) by mouth in the morning.      amLODIPine 5 MG Oral Tab Take 0.5 tablets (2.5 mg total) by mouth daily.      atenolol 25 MG Oral Tab Take 1 tablet (25 mg total) by mouth daily.      atorvastatin 10 MG Oral Tab Take 1 tablet (10 mg total) by mouth nightly.      tamsulosin 0.4 MG Oral Cap Take 1 capsule (0.4 mg total) by mouth daily.      levothyroxine 125 MCG Oral Tab Take 1 tablet (125 mcg total) by mouth before breakfast.  0    triamcinolone acetonide 0.025 % External Cream Apply topically in the morning and before bedtime.      aspirin 81 MG Oral Tab Take 1 tablet (81 mg total) by mouth every other day.      Bioflavonoid Products (DESIRAE C OR) Take 500 mg by mouth in the morning.      Multiple Vitamins-Minerals (MULTIVITAMIN OR) Take by mouth in the morning.      pantoprazole 40 MG Oral Tab EC Take 1 tablet (40 mg total) by mouth in the morning and 1 tablet (40 mg total) before bedtime.     [7]   Patient Active Problem List  Diagnosis    Chronic coronary artery disease    Gastroesophageal reflux disease    Hypogonadism in male    Non-Hodgkin's lymphoma (HCC)    Osteoarthritis    Mixed hyperlipidemia    Hiatal hernia    Enlarged prostate    Lung nodule    Essential hypertension     Acquired hypothyroidism    PAOD (peripheral arterial occlusive disease)    Hyponatremia    Anemia    Acute intractable tension-type headache    Neck pain    Unilateral inguinal hernia without obstruction or gangrene, recurrence not specified    Cervical radicular pain    Hypoxia    Pleural effusion, bilateral    Acute pain of both shoulders    Dyspnea and respiratory abnormalities    Situational anxiety    Hallucinations    SIADH (syndrome of inappropriate ADH production) (HCC)    Abnormal findings on diagnostic imaging of lung    Myelodysplastic syndrome (HCC)

## 2025-06-27 NOTE — TELEPHONE ENCOUNTER
Called [facility] emergency room to give report about me sending James to the ER.  Informed the ER that James presented today for follow-up in regards to cellulitis after being seen a few days ago by me and being prescribed cephalexin.  Informed the ER that the cellulitis has worsened since I saw him last in clinic this week. I am concerned that the cellulitis may worsen or not react if this continues to be treated outpatient, hence ER visit today.    Also informed the ER that James was in severe pain and has very limited range of motion of the left arm and hand due to the swelling.  Left hand skin was also very warm to the touch during my visit today

## 2025-06-27 NOTE — PLAN OF CARE
NURSING ADMISSION NOTE      Patient admitted via Ambulance  Oriented to room.  Safety precautions initiated.  Bed in low position.  Call light in reach.  Patient declined 2 person skin check at this time.

## 2025-06-27 NOTE — ED INITIAL ASSESSMENT (HPI)
Cellulitis of left hand since June 10th. Took antibiotics- improved, then got worse- saw pcp and sent here for IV antibiotics. No known fever, +chills.

## 2025-06-27 NOTE — ED PROVIDER NOTES
Patient was to doctors office today complaining of hand pain and swelling.  Patient complaining of pain and swelling in his hand since Wednesday night, extremely intense.  Patient had been prescribed Keflex antibiotic with no improvement.  Pain is intense with even light touch and has been, warm.  Pain is worse with flexing the fingers..  Patient also reported that his blood pressure has been low in the mornings, 70/40 and 87/59.  Blood pressure later improved to about 122/50.  Patient felt chilled without fever.               On examination, there is warmth, swelling, tenderness noted be exquisite over the wrist with an area of none sharply demarcated blanching erythema extending from the dorsum of the hand about group home up the forearm  Radial pulses strong.      Patient presents with recurrent pain in the left wrist and hand.  My impression is that this is gout or pseudogout.  Wrist joint aspiration by orthopedist or interventional radiologist can be considered.  Septic joint also include in the differential.  Patient has been treated for cellulitis with Keflex and his symptoms have just been worsening.  DVT seems unlikely as a cause for his symptoms.  No arterial insufficiency on examination    Patient treated with Solu-Medrol and offered stronger pain medication    CBC shows normal white count with mild anemia and mild thrombocytopenia requiring follow-up  Metabolic panel shows sodium 129 with normal glucose  Sed rate elevated 40  Lactic acid negative    Wrist x-ray  CONCLUSION:  1.  No acute osseous findings.  2.  Osteoarthritis.  3.  Curvilinear foreign body along the first interphalangeal joint, correlate clinically.       Hand x-ray   I personally reviewed the actual radiographs themselves and my individual interpretation shows no fracture  radiologist's formal interpretation which I have reviewed  CONCLUSION:  1.  No acute osseous findings.  2.  Osteoarthritis.  3.  Curvilinear foreign body along the first  interphalangeal joint, correlate clinically.       Ultrasound venous Doppler left upper extremity  CONCLUSION: No acute deep vein thrombosis           Patient spiked a fever here.  He was treated with Tylenol.  Blood culture sent.  Lactic acid ordered.   Patient started on Ancef.    I discussed case with [facility]ist, Dr. Amador.  She will admit and make further recommendations           I provided a substantive portion of care for this patient. I personally performed the medical decision making for this encounter.

## 2025-06-27 NOTE — ED PROVIDER NOTES
Patient Seen in: [facility] [place]        History  Chief Complaint   Patient presents with    Cellulitis     Stated Complaint: Cellulitis of left hand- saw pcp and sent here for IV antibiotics    Subjective:   HPI            79-year-old gentleman.  Arrives to the ER for evaluation of recurrent cellulitis of the left upper extremity.  Patient was initially seen by his primary care physician late last month and treated with a intramuscular injection of Rocephin, and cefuroxime.  He then followed up and a steroid course was added, initially 5 days, then extended to 8 days..  Most recently, 3 days prior to arrival, he was placed on Keflex.        Objective:     Past Medical History:    ALCOHOL USE    Anemia    Anxiety    Atherosclerosis of coronary artery    Chronic coronary artery disease    Congestive heart disease (HCC)    Depression    Sometimes    Dysfunction of eustachian tube    Essential hypertension    Gastroesophageal reflux disease    Hyperlipidemia    Hypertension    Hypogonadism in male    Description: Per Dr. Byrne     Hypothyroidism    Non-Hodgkin's lymphoma (HCC)    Osteoarthritis              Past Surgical History:   Procedure Laterality Date    Angioplasty (coronary)      Bowel resection      Cath angio  2025    Colectomy      Colonoscopy      Hernia surgery      Other surgical history      Rotator cuff repair Right     Skin surgery      Tonsillectomy      Vasectomy                  Social History     Socioeconomic History    Marital status:    Tobacco Use    Smoking status: Former     Current packs/day: 0.00     Average packs/day: 2.0 packs/day for 25.0 years (50.0 ttl pk-yrs)     Types: Cigarettes     Quit date: 1988     Years since quittin.2    Smokeless tobacco: Never   Vaping Use    Vaping status: Never Used   Substance and Sexual Activity    Alcohol use: Yes     Comment: couple beers a day but nothing since [facility]    Drug use: No   Other Topics  Concern    Caffeine Concern Yes     Comment: Coffee     Social Drivers of Health     Food Insecurity: No Food Insecurity (4/29/2025)    NCSS - Food Insecurity     Worried About Running Out of Food in the Last Year: No     Ran Out of Food in the Last Year: No   Transportation Needs: No Transportation Needs (4/29/2025)    NCSS - Transportation     Lack of Transportation: No   Housing Stability: Not At Risk (4/29/2025)    NCSS - Housing/Utilities     Has Housing: Yes     Worried About Losing Housing: No     Unable to Get Utilities: No                                Physical Exam    ED Triage Vitals   BP 06/27/25 1427 151/53   Pulse 06/27/25 1427 85   Resp 06/27/25 1427 18   Temp 06/27/25 1427 98.3 °F (36.8 °C)   Temp src 06/27/25 1728 Oral   SpO2 06/27/25 1427 100 %   O2 Device 06/27/25 1427 None (Room air)       Current Vitals:   Vital Signs  BP: 155/62  Pulse: 91  Resp: 18  Temp: 99.1 °F (37.3 °C)  Temp src: Oral    Oxygen Therapy  SpO2: 100 %  O2 Device: None (Room air)            Physical Exam     Gen: Well appearing, well groomed, alert and aware x 3  Neck: Supple, full range of motion  Eye examination: EOMs are intact, normal conjunctival  ENT: Atraumatic  Lung: No distress, RR, no retraction  Extremities: Diffuse swelling to the left hand and wrist region.  Disproportionate pain to palpation.  Minimal erythema.  No significant warmth.  No ascending lymphangitis.  Pain with attempted opposition of all digits.  Strong radial pulse.  Back: Full range of motion  Skin: No sign of trauma, Skin warm and dry, no induration or sign of infection.         ED Course  Labs Reviewed   SED RATE, WESTERGREN (AUTOMATED) - Abnormal; Notable for the following components:       Result Value    Sed Rate 40 (*)     All other components within normal limits   BASIC METABOLIC PANEL (8) - Abnormal; Notable for the following components:    Glucose 104 (*)     Sodium 129 (*)     Chloride 95 (*)     Calculated Osmolality 269 (*)     All  other components within normal limits   CBC WITH DIFFERENTIAL WITH PLATELET - Abnormal; Notable for the following components:    RBC 3.07 (*)     HGB 9.1 (*)     HCT 28.9 (*)     .0 (*)     Immature Platelet Fraction 9.3 (*)     Lymphocyte Absolute 0.75 (*)     Monocyte Absolute 2.42 (*)     All other components within normal limits   RBC MORPHOLOGY SCAN - Abnormal; Notable for the following components:    RBC Morphology See morphology below (*)     Macrocytosis 2+ (*)     All other components within normal limits   POCT ISTAT CHEM8 CARTRIDGE - Abnormal; Notable for the following components:    ISTAT Sodium 129 (*)     ISTAT Chloride 94 (*)     ISTAT Hematocrit 29 (*)     ISTAT Glucose 102 (*)     All other components within normal limits   LACTIC ACID, PLASMA - Normal   SCAN SLIDE   BLOOD CULTURE   BLOOD CULTURE        XR HAND (MIN 3 VIEWS), LEFT (CPT=73130)  Result Date: 6/27/2025  PROCEDURE: XR WRIST COMPLETE (MIN 3 VIEWS), LEFT (CPT=73110), XR HAND (MIN 3 VIEWS), LEFT (CPT=73130) INDICATIONS: Cellulitis of left hand- saw pcp and sent here for IV antibiotics PATIENT STATED HISTORY: Swelling and pain to left hand and wrist 6/10 and then again the last 3 days. TECHNIQUE:  Standard view(s) of the hand and wrist were obtained. XR WRIST COMPLETE (MIN 3 VIEWS), LEFT (CPT=73110), XR HAND (MIN 3 VIEWS), LEFT (CPT=73130) COMPARISON: None FINDINGS: No acute fracture or dislocation. Mild degenerative changes within the interphalangeal joints are present. Moderate degenerative changes at the first CMC joint. Curvilinear radiopaque foreign body is present at the first interphalangeal joint, correlate clinically.     CONCLUSION: 1.  No acute osseous findings. 2.  Osteoarthritis. 3.  Curvilinear foreign body along the first interphalangeal joint, correlate clinically. Electronically Verified and Signed by Attending Radiologist: Xochitl Reaves MD 6/27/2025 3:17 PM Workstation: [id number]    XR WRIST COMPLETE (MIN 3  VIEWS), LEFT (CPT=73110)  Result Date: 6/27/2025  PROCEDURE: XR WRIST COMPLETE (MIN 3 VIEWS), LEFT (CPT=73110), XR HAND (MIN 3 VIEWS), LEFT (CPT=73130) INDICATIONS: Cellulitis of left hand- saw pcp and sent here for IV antibiotics PATIENT STATED HISTORY: Swelling and pain to left hand and wrist 6/10 and then again the last 3 days. TECHNIQUE:  Standard view(s) of the hand and wrist were obtained. XR WRIST COMPLETE (MIN 3 VIEWS), LEFT (CPT=73110), XR HAND (MIN 3 VIEWS), LEFT (CPT=73130) COMPARISON: None FINDINGS: No acute fracture or dislocation. Mild degenerative changes within the interphalangeal joints are present. Moderate degenerative changes at the first CMC joint. Curvilinear radiopaque foreign body is present at the first interphalangeal joint, correlate clinically.     CONCLUSION: 1.  No acute osseous findings. 2.  Osteoarthritis. 3.  Curvilinear foreign body along the first interphalangeal joint, correlate clinically. Electronically Verified and Signed by Attending Radiologist: Xochitl Reaves MD 6/27/2025 3:17 PM Workstation: [id number]                 [facility]     The patient's pain to palpation is disproportionate.  There is minimal erythema.  No induration.  Minimal warmth.  Clinically, concern for gout versus pseudogout versus ongoing cellulitis.    CBC, BMP, sed rate    Ultrasound of the upper extremity.  X-ray of the hand and wrist    My supervising physician was involved in the management of this patient.    IV Solu-Medrol and further analgesics provided    My supervising physician was involved in the management of this patient.    Patient reevaluated.  He was noted to be hot to the touch.  Retook vital signs.  Temperature 101.2.  Lactic acid, blood cultures and IV antibiotics initiated.    Sed rate of 40    Hemoglobin of 9.1.  No leukocytosis.  Platelets of 140    Sodium 129, chloride 94, glucose 102    CONCLUSION: 1.  No acute osseous findings. 2.  Osteoarthritis. 3.  Curvilinear foreign body along the  first interphalangeal joint, correlate clinically. Electronically Verified and Signed by Attending Radiologist: Xochitl Reaves MD 6/27/2025 3:17 PM Workstation: [id number]      CONCLUSION: 1.  No acute osseous findings. 2.  Osteoarthritis. 3.  Curvilinear foreign body along the first interphalangeal joint, correlate clinically. Electronically Verified and Signed by Attending Radiologist: Xochitl Reaves MD 6/27/2025 3:17 PM Workstation: [id number]    Lactic acid normal    Patient will be admitted for further IV antibiotics.        Medical Decision Making      Disposition and Plan     Clinical Impression:  1. Acute pain of left wrist    2. Hyponatremia         Disposition:  There is no disposition on file for this visit.  There is no disposition time on file for this visit.    Follow-up:  No follow-up provider specified.        Medications Prescribed:  Current Discharge Medication List                Supplementary Documentation:

## 2025-06-27 NOTE — ED QUICK NOTES
Orders for admission, patient is aware of plan and ready to go upstairs. Any questions, please call ED RN Eric at extension [phone].     Patient Covid vaccination status: Fully vaccinated     COVID Test Ordered in ED: None    COVID Suspicion at Admission: N/A    Running Infusions: Medication Infusions[1] None    Mental Status/LOC at time of transport: A&O*4/4    Other pertinent information:   CIWA score: N/A   NIH score:  N/A             [1]

## 2025-06-27 NOTE — PROGRESS NOTES
[facility] Family Medicine Office Note  Chief Complaint:   Chief Complaint   Patient presents with    Cellulitis     F/u, worsening    Blood Pressure     Pt states he has had low blood pressure readings at home 70/42, this morning 87/59       HPI:     History of Present Illness  James Roberts is a 79 year old male who presents a follow-up on cellulitis of his left hand. He was last seen by me on 25 for the cellulitis in which he was prescribed cephalexin 500mg - 1 capsule (500mg) BID for 7 days.    He has experienced significant worsening of pain and swelling in his hand since Wednesday night, describing the pain as 'off the charts'. The symptoms have prevented him from attending a  yesterday. Despite taking cephalexin as prescribed, there has been no improvement. The pain is intense even with light touch, and the area is warm. He is unable to flex his hand and has no strength, impacting his ability to  anything. The swelling has increased since this morning. His wife states that the swelling is so severe that his hand turns white, and she notes symptoms looks like Raynaud's phenomenon.    When he first saw his pcp Dr. Lundberg for the cellulitis initially a few weeks ago, the swelling extended from his left hand to his elbow, and he received a Rocephin shot. The states his fingers then appeared 'ballooned out' initially, and he could not close or open them.     He notes a history of a skin infection or break in the skin, possibly related to an IV insertion during a recent angioplasty.  He also reports new spots on his skin that were not present during his last visit on Wednesday    He has experienced low blood pressure readings in the mornings, with one instance of 70/42 and another of 87/59. He vomited on Wednesday morning and has not taken his amlodipine today, which he usually takes in the morning. His blood pressure is stabilized currently at 122/50.                    Past Medical  History[1]  Past Surgical History[2]  Social History:  Short Social Hx on File[3]  Family History:  Family History[4]  Allergies:  Allergies[5]  Current Meds:  Current Medications[6]   Counseling given: Not Answered       REVIEW OF SYSTEMS:   Review of Systems   Cardiovascular:         Hypotension   Musculoskeletal:  Positive for myalgias.        Limited rom of left hand and wrist   Skin:  Positive for color change.        EXAM:   /50   Resp 16   Ht 5' 8\" (1.727 m)   Wt 156 lb (70.8 kg)   BMI 23.72 kg/m²  Estimated body mass index is 23.72 kg/m² as calculated from the following:    Height as of this encounter: 5' 8\" (1.727 m).    Weight as of this encounter: 156 lb (70.8 kg).   Vital signs reviewed.Appears stated age, well groomed.  Physical Exam  Constitutional:       Appearance: Normal appearance.   Musculoskeletal:         General: Swelling and tenderness present.      Comments: Significant swelling and tenderness to left hand and wrist. Patient in severe pain with light palpation of left hand. Radial pulse intact. Some whiteness of left fingertips indicating possible circulation issue ongoing.  Patient unable to freely flex fingers due to pain and swelling   Neurological:      Mental Status: He is alert.          ASSESSMENT AND PLAN:   1. Cellulitis of other specified site  Cellulitis has worsened with ineffective oral cephalexin, raising concerns for deeper tissue involvement and systemic spread.  - Advised James Roberts and wife to go to  [facility] ER for further evaluation and potential IV antibiotics. They agreed to go.  - Advise to keep the hand elevated and follow-up after ER visit.      Meds, Orders, & Refills for this Visit:  Requested Prescriptions      No prescriptions requested or ordered in this encounter         Imaging & Consults:  None      Health Maintenance:  Health Maintenance Due   Topic Date Due    Annual Physical  Never done    Colorectal Cancer Screening   09/15/2018    COVID-19 Vaccine (8 - 2024-25 season) 04/29/2025         Problem List:  Problem List[7]      Patient/Caregiver Education: Patient/Caregiver Education: There are no barriers to learning. Medical education done.   Outcome: James oRberts verbalizes understanding, agrees with the plan, and had no other questions at the end of today's visit. James Roberts is informed to call with any questions, complications, allergies, or worsening or changing symptoms.  James [facility]ando is to call with any side effects or complications from the treatments as a result of today.     Follow-up in   Return if symptoms worsen or fail to improve.    Note to patient: The 21st Century Cures Act makes medical notes like these available to patients in the interest of transparency. However, this is a medical document intended as peer to peer communication. It is written in medical language and may contain abbreviations or verbiage that are unfamiliar. It may appear blunt or direct. Medical documents are intended to carry relevant information, facts as evident, and the clinical opinion of the practitioner.         [1]   Past Medical History:   ALCOHOL USE    Anemia    Anxiety    Atherosclerosis of coronary artery    Chronic coronary artery disease    Congestive heart disease (HCC)    Depression    Sometimes    Dysfunction of eustachian tube    Essential hypertension    Gastroesophageal reflux disease    Hyperlipidemia    Hypertension    Hypogonadism in male    Description: Per Dr. Byrne     Hypothyroidism    Non-Hodgkin's lymphoma (HCC)    Osteoarthritis   [2]   Past Surgical History:  Procedure Laterality Date    Angioplasty (coronary)      Bowel resection      Cath angio  05/01/2025    Colectomy      Colonoscopy      Hernia surgery      Other surgical history      Rotator cuff repair Right     Skin surgery      Tonsillectomy      Vasectomy     [3]   Social History  Socioeconomic History    Marital status:     Tobacco Use    Smoking status: Former     Current packs/day: 0.00     Average packs/day: 2.0 packs/day for 25.0 years (50.0 ttl pk-yrs)     Types: Cigarettes     Quit date: 1988     Years since quittin.2    Smokeless tobacco: Never   Vaping Use    Vaping status: Never Used   Substance and Sexual Activity    Alcohol use: Yes     Comment: couple beers a day but nothing since easter    Drug use: No   Other Topics Concern    Caffeine Concern Yes     Comment: Coffee     Social Drivers of Health     Food Insecurity: No Food Insecurity (2025)    NCSS - Food Insecurity     Worried About Running Out of Food in the Last Year: No     Ran Out of Food in the Last Year: No   Transportation Needs: No Transportation Needs (2025)    NCSS - Transportation     Lack of Transportation: No   Housing Stability: Not At Risk (2025)    NCSS - Housing/Utilities     Has Housing: Yes     Worried About Losing Housing: No     Unable to Get Utilities: No   [4]   Family History  Problem Relation Age of Onset    Alcohol and Other Disorders Associated Other     High Blood Pressure Other     Diabetes Other     Heart Disease Other     Dementia Mother     Heart Disorder Mother     Dementia Father     Heart Disorder Father     Heart Disorder Brother     Heart Disorder Brother    [5]   Allergies  Allergen Reactions    Celecoxib RASH   [6]   No current outpatient medications on file.   [7]   Patient Active Problem List  Diagnosis    Chronic coronary artery disease    Gastroesophageal reflux disease    Hypogonadism in male    Non-Hodgkin's lymphoma (HCC)    Osteoarthritis    Mixed hyperlipidemia    Hiatal hernia    Enlarged prostate    Lung nodule    Essential hypertension    Acquired hypothyroidism    PAOD (peripheral arterial occlusive disease)    Hyponatremia    Anemia    Acute intractable tension-type headache    Neck pain    Unilateral inguinal hernia without obstruction or gangrene, recurrence not specified    Cervical  radicular pain    Hypoxia    Pleural effusion, bilateral    Acute pain of both shoulders    Dyspnea and respiratory abnormalities    Situational anxiety    Hallucinations    SIADH (syndrome of inappropriate ADH production) (HCC)    Abnormal findings on diagnostic imaging of lung    Myelodysplastic syndrome (HCC)    Acute pain of left wrist

## 2025-06-28 NOTE — H&P
[facility]     James Roberts Patient Status:  Inpatient    10/17/1945 MRN [id number]   Location [facility] 3SW-A Attending Danilo Hill MD   Hosp Day # 0 PCP Shahla Lundberg DO     Chief Complaint: left wrist pain    Subjective:    History of Present Illness:     James Roberts is a 79 year old male with prior history of CAD, CHF, hypertension, hyperlipidemia who presented to the department with left wrist pain and swelling.  Patient recently treated for cellulitis involving his left arm and wrist and completed a course of antibiotics with good results.  On Wednesday he woke up and noted worsening pain and swelling in his left wrist again.  He called his PCP who started him on antibiotics.  He woke today and is hand was still swollen and it was unable to close it, with severe pain.  Denies having any fevers, no chills, no nausea or vomiting, no chest pain or shortness of breath.  He had no other acute complaints at this time.    History/Other:    Past Medical History:  Past Medical History[1]  Past Surgical History:   Past Surgical History[2]   Family History:   Family History[3]  Social History:    reports that he quit smoking about 37 years ago. His smoking use included cigarettes. He has a 50 pack-year smoking history. He has never used smokeless tobacco. He reports current alcohol use. He reports that he does not use drugs.     Allergies: Allergies[4]    Medications:  Medications Ordered Prior to Encounter[5]    Review of Systems:   A comprehensive review of systems was completed.    Pertinent positives and negatives noted in the HPI.    Objective:   Physical Exam:    /70 (BP Location: Right arm)   Pulse 80   Temp 98.8 °F (37.1 °C) (Oral)   Resp 20   Ht 5' 8\" (1.727 m)   Wt 156 lb (70.8 kg)   SpO2 97%   BMI 23.72 kg/m²   General: No acute distress, Alert  Respiratory: No rhonchi, no wheezes  Cardiovascular: S1, S2. Regular rate and  rhythm  Abdomen: Soft, Non-tender, non-distended, positive bowel sounds  Neuro: No new focal deficits  Extremities: No edema        Results:    Labs:      Labs Last 24 Hours:    Recent Labs   Lab 06/27/25  1446   RBC 3.07*   HGB 9.1*   HCT 28.9*   MCV 94.1   MCH 29.6   MCHC 31.5   RDW 21.3   NEPRELIM 4.55   WBC 8.0   .0*       Recent Labs   Lab 06/27/25  1446 06/27/25  1454   *  --    BUN 15  --    CREATSERUM 0.81  --    EGFRCR 90 91   CA 9.5  --    *  --    K 4.8  --    CL 95*  --    CO2 27.0  --        Estimated Glomerular Filtration Rate: 91 mL/min/1.73m2 (result from lab).    Lab Results   Component Value Date    INR 1.14 05/02/2025    INR 1.21 (H) 04/29/2025    INR 1 07/21/2014       No results for input(s): \"TROP\", \"TROPHS\", \"CK\" in the last 168 hours.    No results for input(s): \"TROP\", \"PBNP\" in the last 168 hours.    No results for input(s): \"PCT\" in the last 168 hours.    Imaging: Imaging data reviewed in Epic.    Assessment & Plan:      #Left wrist cellulitis  Fever on presentation, no leukocytosis  Xrays neg for fracture  US neg for DVT   Started Cefazolin in ER, will continue  F/u cultures  Add on ESR, CRP, Uric acid     #HFpEF - compensated  #CAD  #Essential HTN  Resume aspirin, amlodipine, atenolol, Lipitor    #Hypothyroidism  Synthroid    #HLD  Statin    #GERD  PPI    #BPH  Flomax     #Chronic anemia  Hg 9.1, was similar post 2 months  Trend CBC    #Hyponatremia  Likely related to low solute intake  IVFs      Plan of care discussed with patient, er physician, nurse     Richard Bergman MD    Supplementary Documentation:     The 21st Century Cures Act makes medical notes like these available to patients in the interest of transparency. Please be advised this is a medical document. Medical documents are intended to carry relevant information, facts as evident, and the clinical opinion of the practitioner. The medical note is intended as peer to peer communication and may appear blunt or  direct. It is written in medical language and may contain abbreviations or verbiage that are unfamiliar.               **Certification      PHYSICIAN Certification of Need for Inpatient Hospitalization - Initial Certification    Patient will require inpatient services that will reasonably be expected to span two midnight's based on the clinical documentation in H+P.   Based on patients current state of illness, I anticipate that, after discharge, patient will require TBD.                        [1]   Past Medical History:   ALCOHOL USE    Anemia    Anxiety    Atherosclerosis of coronary artery    Chronic coronary artery disease    Congestive heart disease (HCC)    Depression    Sometimes    Dysfunction of eustachian tube    Essential hypertension    Gastroesophageal reflux disease    Hyperlipidemia    Hypertension    Hypogonadism in male    Description: Per Dr. Byrne     Hypothyroidism    Non-Hodgkin's lymphoma (HCC)    Osteoarthritis   [2]   Past Surgical History:  Procedure Laterality Date    Angioplasty (coronary)      Bowel resection      Cath angio  05/01/2025    Colectomy      Colonoscopy      Hernia surgery      Other surgical history      Rotator cuff repair Right     Skin surgery      Tonsillectomy      Vasectomy     [3]   Family History  Problem Relation Age of Onset    Alcohol and Other Disorders Associated Other     High Blood Pressure Other     Diabetes Other     Heart Disease Other     Dementia Mother     Heart Disorder Mother     Dementia Father     Heart Disorder Father     Heart Disorder Brother     Heart Disorder Brother    [4]   Allergies  Allergen Reactions    Celecoxib RASH   [5]   Current Facility-Administered Medications on File Prior to Encounter   Medication Dose Route Frequency Provider Last Rate Last Admin    [COMPLETED] lidocaine PF (Xylocaine-MPF) 1 % injection             [COMPLETED] verapamil (Isoptin) 2.5 mg/mL injection             [COMPLETED] heparin (Porcine) 5000 UNIT/ML  injection             [COMPLETED] Nitroglycerin in D5W 200-5 MCG/ML-% injection             [COMPLETED] iopamidol 76% (ISOVUE-370) injection for power injector  100 mL Injection ONCE PRN Riley Reaves MD   70 mL at 25 0940    [COMPLETED] fentaNYL (Sublimaze) 50 mcg/mL injection             [COMPLETED] midazolam (Versed) 2 MG/2ML injection             [COMPLETED] tolvaptan (Samsca) tab 15 mg  15 mg Oral Once Jamar Erazo MD   15 mg at 25 1425    [COMPLETED] amLODIPine (Norvasc) tab 2.5 mg  2.5 mg Oral Once Nathaniel Jeffries MD   2.5 mg at 25 1030    [COMPLETED] sodium chloride 0.9% infusion   Intravenous On Call Nathaniel Jeffries MD 20 mL/hr at 25 0746 New Bag at 25 0746    [] hydrALAzine (Apresoline) 20 mg/mL injection 10 mg  10 mg Intravenous Q6H PRN Nigel Escobar MD        [COMPLETED] furosemide (Lasix) 10 mg/mL injection 40 mg  40 mg Intravenous Once Nigel Milligan MD   40 mg at 25 1251    [COMPLETED] iopamidol 76% (ISOVUE-370) injection for power injector  100 mL Intravenous ONCE PRN Nigel Milligan MD   100 mL at 25 1421    [COMPLETED] acetaminophen (Tylenol Extra Strength) tab 1,000 mg  1,000 mg Oral Once Magy Castaneda MD   1,000 mg at 25 1436    [COMPLETED] iopamidol 76% (ISOVUE-370) injection for power injector  70 mL Intravenous ONCE PRN Nina Seth MD   70 mL at 25 1803    [COMPLETED] morphINE PF 4 MG/ML injection 4 mg  4 mg Intravenous Once Leatha Green MD   4 mg at 25 1546    [COMPLETED] ondansetron (Zofran) 4 MG/2ML injection 4 mg  4 mg Intravenous Once Leatha Green MD   4 mg at 25 1546    [COMPLETED] sodium chloride 0.9 % IV bolus 1,000 mL  1,000 mL Intravenous Once Leatha Green MD   Stopped at 25 1828    [COMPLETED] iopamidol 76% (ISOVUE-370) injection for power injector  60 mL Intravenous ONCE PRN Leatha Green MD   60 mL at 25 1710    [COMPLETED] morphINE PF 4 MG/ML  injection 4 mg  4 mg Intravenous Once Leahta Green MD   4 mg at 04/23/25 2026     Current Outpatient Medications on File Prior to Encounter   Medication Sig Dispense Refill    cephALEXin 500 MG Oral Cap Take 1 capsule (500 mg total) by mouth 2 (two) times daily for 7 days. 14 capsule 0    cefuroxime 250 MG Oral Tab Take 1 tablet (250 mg total) by mouth 2 (two) times daily. 20 tablet 0    Gabapentin 100 MG Oral Tab Take 1 tablet (100 mg total) by mouth 2 (two) times daily. (Patient taking differently: Take 1 tablet (100 mg total) by mouth before bedtime.) 60 tablet 11    amLODIPine 5 MG Oral Tab Take 0.5 tablets (2.5 mg total) by mouth daily.      atenolol 25 MG Oral Tab Take 1 tablet (25 mg total) by mouth daily.      atorvastatin 10 MG Oral Tab Take 1 tablet (10 mg total) by mouth nightly.      tamsulosin 0.4 MG Oral Cap Take 1 capsule (0.4 mg total) by mouth daily.      levothyroxine 125 MCG Oral Tab Take 1 tablet (125 mcg total) by mouth before breakfast.  0    aspirin 81 MG Oral Tab Take 1 tablet (81 mg total) by mouth every other day.      pantoprazole 40 MG Oral Tab EC Take 1 tablet (40 mg total) by mouth in the morning and 1 tablet (40 mg total) before bedtime.      HYDROcodone-acetaminophen 5-325 MG Oral Tab Take 1 tablet by mouth every 6 (six) hours as needed for Pain. (Patient not taking: Reported on 6/27/2025) 20 tablet 0    ketoconazole 2 % External Shampoo Apply topically.      clobetasol 0.05 % External Solution Apply topically.      clobetasol 0.05 % External Cream Apply topically.      Cholecalciferol (VITAMIN D) 50 MCG (2000 UT) Oral Cap Take 1 capsule (2,000 Units total) by mouth in the morning.      vitamin E 400 UNITS Oral Cap Take 1 capsule (400 Units total) by mouth in the morning.      triamcinolone acetonide 0.025 % External Cream Apply topically in the morning and before bedtime.      Bioflavonoid Products (DESIRAE C OR) Take 500 mg by mouth in the morning.      Multiple  Vitamins-Minerals (MULTIVITAMIN OR) Take by mouth in the morning.

## 2025-06-28 NOTE — PLAN OF CARE
AxO4. VSS on RA. Denies nausea on arrival, tolerating dinner tonight. Voiding w/o issue. Reporting mild pain to L wrist - per patient redness and swelling improving, observed using hand on assessment, able to , denies N/T. On IV ancef Q8. IVF infusing as ordered. Up adlib. Updated on POC. Safety measures placed. Care ongoing.

## 2025-06-28 NOTE — PLAN OF CARE
Alert and oriented x 4. Vitals stable on room air. States mild pain to left wrist. Redness and swelling in remission.  Voiding without difficulty. Tolerating regular diet. SCD's on bilaterally. Safety precautions in place. Plan to be discharged today with PO steroid and antibiotic. Plan of care discussed with patient. Family at bedside. IV removed. Script sent electronically to patient pharmacy.

## 2025-06-28 NOTE — DISCHARGE SUMMARY
[facility]     James Roberts Patient Status:  Inpatient    10/17/1945 MRN [id number]   Location [facility] 3SW-A Attending No att. providers found   Hosp Day # 1 PCP Shahla Lundberg DO     Date of Admission: 2025  Date of Discharge:  2025     Discharge Disposition: Home or Self Care    Discharge Diagnosis:  Presumed left wrist OA  Left wrist cellulitis present PTA  Chronic diastolic heart failure  CAD  Essential HTN  Hypothyroidism  Dyslipidemia  GERD  BPH  Chronic anemia  Hyponatremia     History of Present Illness: James Roberts is a 79 year old male with prior history of CAD, CHF, hypertension, hyperlipidemia who presented to the department with left wrist pain and swelling.  Patient recently treated for cellulitis involving his left arm and wrist and completed a course of antibiotics with good results.  On Wednesday he woke up and noted worsening pain and swelling in his left wrist again.  He called his PCP who started him on antibiotics.  He woke today and is hand was still swollen and it was unable to close it, with severe pain.  Denies having any fevers, no chills, no nausea or vomiting, no chest pain or shortness of breath.  He had no other acute complaints at this time.     Brief Synopsis:     #Left wrist cellulitis  Clinically improving on IV antibiotics - complete course of keflex at DC  Xrays neg for fracture  Offered MRI - patient would like to complete this as outpatient as he is feeling better   US neg for DVT     #Left wrist OA  -Medrol dosepack     #HFpEF - compensated  #CAD  #Essential HTN  aspirin, amlodipine, atenolol, Lipitor     #Hypothyroidism  Synthroid    #HLD  Statin    #GERD  PPI    #BPH  Flomax      #Chronic anemia  Hg 9.1, was similar post 2 months     #Hyponatremia  Improved with IVF    #Disposition  -Stable for DC home  -Script provided to schedule MRI of left wrist     Lace+ Score: 81  59-90 High Risk  29-58 Medium Risk  0-28    Low Risk       TCM Follow-Up Recommendation:  LACE > 58: High Risk of readmission after discharge from the hospital.  **Certification    Admission date was 6/27/2025.  Inpatient stay was shorter than expected.  Patient's Acute pain of left wrist was initially serious enough to expect a more lengthy hospitalization but patient improved faster than expected.                 Procedures during hospitalization:   None    Incidental or significant findings and recommendations (brief descriptions):  None    Lab/Test results pending at Discharge:   None    Consultants:  None    Discharge Medication List:     Discharge Medications        START taking these medications        Instructions Prescription details   methylPREDNISolone 4 MG Tbpk  Commonly known as: Medrol Dosepak      Take as directed on dose pack instructions   Quantity: 21 tablet  Refills: 0            CONTINUE taking these medications        Instructions Prescription details   amLODIPine 5 MG Tabs  Commonly known as: Norvasc      Take 0.5 tablets (2.5 mg total) by mouth daily.   Refills: 0     aspirin 81 MG Tabs      Take 1 tablet (81 mg total) by mouth every other day.   Refills: 0     atenolol 25 MG Tabs  Commonly known as: Tenormin      Take 1 tablet (25 mg total) by mouth daily.   Refills: 0     atorvastatin 10 MG Tabs  Commonly known as: Lipitor      Take 1 tablet (10 mg total) by mouth nightly.   Refills: 0     cefuroxime 250 MG Tabs  Commonly known as: Ceftin      Take 1 tablet (250 mg total) by mouth 2 (two) times daily for 5 days.   Stop taking on: July 3, 2025  Quantity: 10 tablet  Refills: 0     cephALEXin 500 MG Caps  Commonly known as: Keflex      Take 1 capsule (500 mg total) by mouth 2 (two) times daily for 7 days.   Stop taking on: July 1, 2025  Quantity: 14 capsule  Refills: 0     clobetasol 0.05 % Soln  Commonly known as: Temovate      Apply topically.   Refills: 0     clobetasol 0.05 % Crea  Commonly known as: Temovate      Apply  topically.   Refills: 0     DESIRAE C OR      Take 500 mg by mouth in the morning.   Refills: 0     Gabapentin 100 MG Tabs      Take 1 tablet (100 mg total) by mouth before bedtime.   Refills: 0     ketoconazole 2 % Sham  Commonly known as: Nizoral      Apply topically.   Refills: 0     levothyroxine 125 MCG Tabs  Commonly known as: Synthroid      Take 1 tablet (125 mcg total) by mouth before breakfast.   Refills: 0     MULTIVITAMIN OR      Take by mouth in the morning.   Refills: 0     pantoprazole 40 MG Tbec  Commonly known as: Protonix      Take 1 tablet (40 mg total) by mouth in the morning and 1 tablet (40 mg total) before bedtime.   Refills: 0     tamsulosin 0.4 MG Caps  Commonly known as: Flomax      Take 1 capsule (0.4 mg total) by mouth daily.   Refills: 0     triamcinolone 0.025 % Crea  Commonly known as: Kenalog      Apply topically in the morning and before bedtime.   Refills: 0     Vitamin D 50 MCG (2000 UT) Caps      Take 1 capsule (2,000 Units total) by mouth in the morning.   Refills: 0     vitamin E 400 UNITS Caps  Commonly known as: Alpha-E      Take 1 capsule (400 Units total) by mouth in the morning.   Refills: 0            STOP taking these medications      HYDROcodone-acetaminophen 5-325 MG Tabs  Commonly known as: Norco                  Where to Get Your Medications        These medications were sent to [facility] [address] [place] [address] [phone], [phone]  [address] [place] [id number]      Phone: [phone]   cefuroxime 250 MG Tabs  methylPREDNISolone 4 MG Tbpk         [facility] reviewed: N/A    Follow-up appointment:   Shahla Lundberg DO  [address] [address] [address] [phone]    Call  As needed    Appointments for Next 30 Days 6/28/2025 - 7/28/2025        Date Arrival Time Visit Type Length Department Provider     6/30/2025 11:30 AM  [facility] MRI SPINE CERVICAL WW [1438] 60 min [facility] MRI EH MR RM2 (1.5T WIDE)    Patient  Instructions:     You may be subject to a fee if you do not show up for your appointment or you cancel within 24 hours of your appointment.    Please arrive 30 minutes prior to your scheduled appointment time.  You will need time to change your clothes, fill out screening forms, use the restroom, and may need an IV if your exam requires contrast.  If you arrive too late, your appointment may need to be rescheduled.    IF YOU REQUIRE ORAL SEDATION FOR YOUR MRI: Your physician is responsible for giving you a prescription for oral medication which you would fill at your local pharmacy. If you will be taking oral sedation, you must bring a  who will drive you home (the  does not necessarily have to stay throughout the procedure).        Location Instructions:     You may be subject to a fee if you do not show up for your appointment or you cancel within 24 hours of your appointment.  Your appointment will be at [facility] located at [address] [place] [address]. To reach Outpatient Registration, you may park or  in the South Parking Garage. Enter the double doors located on the ground floor and proceed to the lobby past the Information Desk to Outpatient Registration.&nbsp; The phone number for this location is [phone].  Because of the nature of the Emergency Department, please be advised of the possibility your appointment may be delayed at this location.  Due to safety reasons you will be required to change into a gown. You will be asked to remove all metallic items, such as watches, jewelry, hairpins, eyeglasses, hearing aids, and continuous glucose monitoring devices. Your purse, wallet or other personal items will remain in a secure locker or with your family during your exam.  Please bring your insurance card and photo ID. You will also need to bring your doctor's order unless your physician's office submitted the order electronically or faxed the order. Without the  order, your test may be delayed or postponed.  Children: Children under the age of 12 must have another adult caregiver with them.&nbsp; Please do not bring your child/children without a caregiver.&nbsp; Because of the highly sensitive equipment and privacy of all our patients, children will not be permitted in the exam rooms, unless otherwise noted and in accordance with departmental policy.  PATIENT RESPONSIBILITY ESTIMATE  - (Estimate) We will provide you with your estimated remaining deductible and coinsurance balance for your services at the time of check in.  - (Payment) Please be aware that you may be asked for payment at the time of service.  Masks are optional for all patients and visitors, unless otherwise indicated.               7/1/2025 12:30 PM  [facility] PT ORTHO EVAL [[id number]] 45 min [facility] in [facility]     Patient Instructions:         Location Instructions:     You are scheduling this appointment at [facility] in [place]. The address is [address] [place] [address].  Please arrive 10 minutes prior to your scheduled appointment time.  Masks are optional for all patients and visitors, unless otherwise indicated.               7/2/2025  2:00 PM  HOSPITAL FOLLOW UP [2830] 30 min [facility] Nephrology Ezio Bosch MD    Patient Instructions:         Location Instructions:     Masks are optional for all patients and visitors, unless otherwise indicated. Note: A $50 fee will be charged for missed appointments or same-day cancellations. Please provide 24 hours' notice if you need to cancel or reschedule your appointment.               7/10/2025 11:45 AM  [facility] PT ORTHO [place] [[id number]] 45 [facility]     Patient Instructions:         Location Instructions:     You are scheduling this appointment at [facility] in [place]. The address is [address] [place] [address] [address] [facility] [place] [address].  Please arrive 10 minutes prior to your scheduled appointment time.  Masks are optional for all patients and visitors, unless otherwise indicated.               7/15/2025 12:30 PM  [facility] PT ORTHO TX [[id number]] [facility]    Patient Instructions:         Location Instructions:     You are scheduling this appointment at [facility] in [place]. The address is [address] [place] [address].  Please arrive 10 minutes prior to your scheduled appointment time.  Masks are optional for all patients and visitors, unless otherwise indicated.               7/17/2025 12:30 PM  [facility] PT ORTHO TX [[id number]] 45 [facility]    Patient Instructions:         Location Instructions:     You are scheduling this appointment at [facility] in [place]. The address is [address] [place] [address].  Please arrive 10 minutes prior to your scheduled appointment time.  Masks are optional for all patients and visitors, unless otherwise indicated.               7/22/2025 12:30 PM  EE PT ORTHO TX [[id number]] 45 [facility]    Patient Instructions:         Location Instructions:     You are scheduling this appointment at [facility] in [place]. The address is [address] [place] [address].  Please arrive 10 minutes prior to your scheduled appointment time.  Masks are optional for all patients and visitors, unless otherwise indicated.               7/24/2025 12:30 PM  EE PT ORTHO TX [[id number]] 45 min [facility]    Patient Instructions:         Location Instructions:     You are scheduling this appointment at [facility] in [place]. The address is [address] [place] [address].  Please arrive 10 minutes prior to  your scheduled appointment time.  Masks are optional for all patients and visitors, unless otherwise indicated.                      Vital signs:  Temp:  [98.1 °F (36.7 °C)-101.2 °F (38.4 °C)] 98.1 °F (36.7 °C)  Pulse:  [72-91] 79  Resp:  [16-20] 18  BP: (113-155)/(49-70) 114/53  SpO2:  [96 %-100 %] 96 %    Physical Exam:    General: No acute distress   Lungs: clear to auscultation  Cardiovascular: S1, S2  Abdomen: Soft    -----------------------------------------------------------------------------------------------  PATIENT DISCHARGE INSTRUCTIONS: See electronic chart    Elmer Aj DO    Total time spent on discharge plannin minutes     The  Century Cures Act makes medical notes like these available to patients in the interest of transparency. Please be advised this is a medical document. Medical documents are intended to carry relevant information, facts as evident, and the clinical opinion of the practitioner. The medical note is intended as peer to peer communication and may appear blunt or direct. It is written in medical language and may contain abbreviations or verbiage that are unfamiliar.

## 2025-06-29 NOTE — PROGRESS NOTES
Notified by micro lab after discharge regarding positive blood culture. 1/2 blood cultures growing staph epi. Contaminant suspected. Called patient to discuss findings. Patient indicates he is doing well without fever/chills. Explained results described above. Given continued clinical improvement and high suspicion for positive blood culture being contaminant do not feel patient needs to return to hospital at this time. Will continue to follow cultures and update patient with any changes. Discussed with patient that should he develop recurrent fevers or symptomatic worsening, recommend returning to ED for evaluation. Patient verbalized understanding of above and agreeable to plan outlined.    Elmer Aj, DO

## 2025-06-30 NOTE — PROGRESS NOTES
Transitional Care Management   Discharge Date: 25  Contact Date: 2025    Assessment:  (Insert appropriate Smartphrase below after completing flowsheet)  TCM Initial Assessment    General:  Assessment completed with: Patient  Patient Subjective: Ok, I guess. Just fine. I am heading to [facility] for an MRI this morning.  Chief Complaint: Hyponatremia   Acute pain of left wrist  Verify patient name and  with patient/ caregiver: Yes    Hospital Stay/Discharge:  Tell me what you understand of why you were in the hospital or emergency department: He has cellulities in hand and wrist.  Prior to leaving the hospital were your Discharge Instructions reviewed with you?: Yes  Did you receive a copy of your written Discharge Instructions?: Yes  What questions do you have about your Discharge Instructions?: None  Do you feel better or worse since you left the hospital or emergency department?: Better    Follow - Up Appointment:  Do you have a follow-up appointment?: No  Are there any barriers to getting to your follow-up appointment?: No    Home Health/DME:  Prior to leaving the hospital was Home Health (HH) arranged for you?: No     Prior to leaving the hospital or emergency department was Durable Medical Equipment (DME), medical supplies, or infusions arranged for you?: No  Are DME/medical supply/infusions needs identified by staff during this assessment?: No     Medications/Diet:       Were you given a different diet per your Discharge Instructions?: No     Questions/Concerns:  Do you have any questions or concerns that have not been discussed?: No       Follow-up Appointments:  Your appointments       Date & Time Appointment Department (Center)    2025 11:30 AM CDT MRI CERVICAL SPINE WWO with  MR RM2 (1.5T WIDE) [facility] MRI ([facility])    You may be subject to a fee if you do not show up for your appointment or you cancel within 24 hours of your appointment.    Please  arrive 30 minutes prior to your scheduled appointment time.  You will need time to change your clothes, fill out screening forms, use the restroom, and may need an IV if your exam requires contrast.  If you arrive too late, your appointment may need to be rescheduled.    IF YOU REQUIRE ORAL SEDATION FOR YOUR MRI: Your physician is responsible for giving you a prescription for oral medication which you would fill at your local pharmacy. If you will be taking oral sedation, you must bring a  who will drive you home (the  does not necessarily have to stay throughout the procedure).        Jul 01, 2025 12:30 PM CDT Physical Therapy Ortho Evaluation with Rashard Massey PT [facility] [place] ([facility])        Jul 02, 2025 2:00 PM CDT Hospital Follow Up with Ezio Bosch MD [facility] Nephrology ([facility])        Jul 07, 2025 2:30 PM CDT Hospital Follow Up with Shahla Lundberg DO [facility], [facility] ([facility])        Jul 10, 2025 11:45 AM CDT Physical Therapy Ortho Treatment with Rashard Massey, PT [facility] [place] ([facility])        Jul 15, 2025 12:30 PM CDT Physical Therapy Ortho Treatment with Rashard Massey, PT [facility] [place] ([facility])        Jul 17, 2025 12:30 PM CDT Physical Therapy Ortho Treatment with Rashard aMssey, PT [facility] [place] ([facility])        Jul 22, 2025 12:30 PM CDT Physical Therapy Ortho Treatment with Rashard Massey, PT [facility] [place] ([facility])        Jul 24, 2025 12:30 PM CDT Physical Therapy Ortho Treatment with Rashard Massey, PT [facility] [place] ([facility])        Jul 25, 2025 11:15 AM CDT MRI WRIST LEFT with  MR RM3 (3T WIDE) [facility] MRI ([facility])    You may be subject to a fee if you do not show up for your appointment or you cancel within 24 hours of your appointment.    Please arrive 30 minutes prior to your scheduled appointment time.  You will need time to change your clothes, fill out screening forms, use the restroom, and may need an IV if your exam requires contrast.  If you arrive too late, your appointment may need to be rescheduled.    IF YOU REQUIRE ORAL SEDATION FOR YOUR MRI: Your physician is responsible for giving you a prescription for oral medication which you would fill at your local pharmacy. If you will be taking oral sedation, you must bring a  who will drive you home (the  does not necessarily have to stay throughout the procedure).        Aug 15, 2025 1:00 PM CDT Follow Up Visit with Shahla Lundberg DO [facility] ([facility])        Aug 28, 2025 1:30 PM CDT Exam - Established with Eboni Rhoades MD [facility] ([facility])        Sep 02, 2025 2:40 PM CDT Follow Up Visit with Deepti Santiago MD [facility] ([facility])        Nov 13, 2025 11:30 AM CST HEMATOLOGY ONCOLOGY FOLLOW UP with Mariaa Estrella MD [facility] Hematology Oncology [place] ([facility])        Dec 01, 2025 1:20 PM CST Exam - Established with Daniel Reaves MD [address] ([facility])        Mar 02, 2026 1:00 PM CST Medicare Annual Well Visit with Shahla Lundberg DO [facility] ([facility])              [address] [address]  [phone] [facility] Nephrology  [facility]  [address]  [address]  [facility] [id number] [phone] [facility] [place]  [facility] [place]  [address]  [facility] [id number] [phone]    [facility], [facility] [address], [facility]  [address]  [facility] [id number] [phone] [facility] [address]  [address]  [facility] [id number] [phone] [facility], [facility] [address]  [address]  [facility] [address] [phone]    [facility]  [address] [facility] [address]  [facility] [address] [phone] [facility] Hematology Oncology [facility]  [address]  [facility] [address]  [phone]            Transitional [facility] Clinic  Was TCC Ordered: No  Was TCC Scheduled: No   - If yes: []  Advised patient to bring all medications and blood glucose meter/supplies if applicable.    Primary Care Provider (If no TCC appointment)  Does patient already have a PCP appointment scheduled? No  Care Manager Scheduled PCP office TCM appointment with patient     Specialist  Does the patient have any other follow-up appointment(s) that need to be scheduled? Yes   -If yes: Care Manager reviewed upcoming specialist appointments with patient: Yes   -Does the patient need assistance scheduling appointment(s): No      Book By Date: 7/12/25

## 2025-07-01 NOTE — PROGRESS NOTES
SPINE EVALUATION:     Diagnosis:   Neck pain Patient:  James Roberts (79 year old, male)        Referring Provider: Shahla Lundberg  Today's Date   7/1/2025    Precautions:  None   Date of Evaluation: 07/01/25  Next MD visit: No data recorded  Date of Surgery: None     PATIENT SUMMARY     Summary of chief complaints: Patient presents with c/o pain in Sub occipital area , B Cervical paraspinal Ms and B Trap Ms.  History of current condition: Patient reports that he has neck pain since past many year but in April he moved here from [place] and had to do some lifting and due to the lifting he injured his neck. He had severe neck pain and had to go to ER. He had severe stiffness in neck. He states that the pain has decreased now.   Pain level: current 2 /10, at best 1 /10, at worst 10 /10  Description of symptoms: Pain in Sub occipital area , B Cervical paraspinal Ms and B Trap Ms.. Stiffness.   Occupation: Retired   Leisure activities/Hobbies: None   Prior level of function: Was able perform all his daily activities without neck pain.  Current limitations: Has intermittent pain in Cervical spine during household work and Personal Hygiene.  Pt goals: Perform daily activities without neck pain.  Red flag signs/symptoms: Pt denies dizziness, drop attacks, dysphagia, dysarthria, diplopia; Pt denies changes in bowel/bladder function, saddle anesthesia; Pt denies pain that wakes in sleep, fever, recent trauma, history of CA, pain unchanged with movement/activity    Past medical history was reviewed with James.  Significant findings include:    Imaging/Tests:     James  has a past medical history of ALCOHOL USE (1963), Anemia, Anxiety, Atherosclerosis of coronary artery (2004), Chronic coronary artery disease (09/04/2009), Congestive heart disease (HCC) (2004), Depression (?), Dysfunction of eustachian tube (03/19/2014), Essential hypertension (?), Gastroesophageal reflux disease (11/08/2007), Hyperlipidemia  (02/09/2010), Hypertension (09/04/2009), Hypogonadism in male (07/15/2013), Hypothyroidism (06/22/2015), Non-Hodgkin's lymphoma (HCC) (11/08/2007), and Osteoarthritis (11/08/2007).  He  has a past surgical history that includes hernia surgery; Colectomy; tonsillectomy; bowel resection; Rotator cuff repair (Right); cath angio (05/01/2025); angioplasty (coronary); colonoscopy; skin surgery; vasectomy; and other surgical history.    ASSESSMENT  James presents to physical therapy evaluation with primary c/o Patient presents with c/o pain in Sub occipital area , B Cervical paraspinal Ms and B Trap Ms.. The results of the objective tests and measures show Patient has severe limited Cervical Spine ROM. He has soft tissue restriction in B Upper Trap and scelenes Ms. Patient has kyphotic psoture.. Functional deficits include but are not limited to Has intermittent pain in Cervical spine during household work and Personal Hygiene.. Signs and symptoms are consistent with diagnosis of Neck pain. Pt and PT discussed evaluation findings, pathology, POC and HEP.  Pt voiced understanding and performs HEP correctly without reported pain. Skilled Physical Therapy is medically necessary to address the above impairments and reach functional goals.    OBJECTIVE:      Musculoskeletal:  Observation/Posture: forward head posture; rounded shoulders; flattened upper thoracic kyphosis   Accessory Motion:     Palpation: Tenderness at Sub Occipital, Cervical paraspinal and B Upper trap Ms .          ROM and Strength:  (* denotes performed with pain)     Cervical ROM MMT (-/5)     Flex Mod Limitation       Ext Mod Limitation      R L R L     Side bend Max Limitation + pain Max Limitation + pain         Rotation Max Limitation + pain Max Limitation + pain       ,   Shoulder   ROM MMT (-/5)    R L R L     Flex WFL WFL 5/5 5/5     Ext WFL WFL 5/5 5/5     Abd (C5) WFL WFL 5/5 5/5     IR WFL WFL 4/5 4/5     ER WFL WFL 4/5 4/5       Flexibility:  UE  Flexibility R L     Upper Trap significantly restricted significantly restricted     Levator Scap significantly restricted significantly restricted     Pec Major         Scalenes significantly restricted significantly restricted       Neurological:  Sensation: grossly intact to light touch JOSEP UE/LE     Deep Tendon Reflexes: grossly intact JOSEP UE/LE        Peripheral Neurodynamic: WNL except     Balance and Functional Mobility:  Gait: pt ambulates on level ground with normal mechanics.            Today's Treatment and Response:   Pt education was provided on exam findings, treatment diagnosis, treatment plan, expectations, and prognosis.    Today's Treatment       7/1/2025   Spine Treatment   Therapeutic Exercise Reviewed HEP from past PT tx and Added Seated Thoracic Ext, Upper trap styretch to HEP 10 reps 3 times per day.   Therapeutic Exercise Minutes 15   Evaluation Minutes 30   Total Time Of Timed Procedures 15   Total Time Of Service-Based Procedures 30   Total Treatment Time 45        Patient was instructed in and issued a HEP for:      Charges:  PT EVAL: Low Complexity,    In agreement with evaluation findings and clinical rationale, this evaluation involved LOW COMPLEXITY decision making due to no personal factors/comorbidities, 1-2 body structures involved/activity limitations, and stable symptoms as documented in the evaluation.                                                                         PLAN OF CARE:      Goals: (to be met in 8 visits)   1: Improve Cervical Spine ROM to WFL and painfree.  2: Patient will be able to perform Personal Hygiene and daily household activities without Cervical pain.  3: Patient will be able to turn his head during driving.  4 Improve NDI from 56% to 46% or less.     Frequency / Duration: Patient will be seen 2 X per weekx/week or a total of 8  visits over a 90 day period. Treatment will include: Manual Therapy; Neuromuscular Re-education; Therapeutic Exercise; Home  Exercise Program instruction    Education or treatment limitation: None   Rehab Potential: excellent   Neck Disability Index Score  Score: (Patient-Rptd) 56 % (6/26/2025 12:43 PM)      Patient/Family/Caregiver was advised of these findings, precautions, and treatment options and has agreed to actively participate in planning and for this course of care.    Thank you for your referral. Please co-sign or sign and return this letter via fax as soon as possible to [phone]. If you have any questions, please contact me at Dept: [phone]    Sincerely,  Electronically signed by therapist: Rashard Massey PT  Physician's certification required: Yes  I certify the need for these services furnished under this plan of treatment and while under my care.    X___________________________________________________ Date____________________    Certification From: 7/1/2025  To: 9/29/2025

## 2025-07-02 NOTE — PROGRESS NOTES
Nephrology Progress Note      ASSESSMENT/PLAN:      1) Hyponatremia- chronic Na approx 130 meq/L x yrs (pt states x 20 yrs since initial non-Hodgkin's lymphoma) is c/w chronic SIADH exac by generous fluid intake- he is a retired  and has always consumed 1-2 gallons of water daily; recent Na lower due to decreased food / solute intake with 20# weight loss associated with stress of moving from [place] 4/25.  There is no clear evidence of active malignancy; CTA chest unremarkable. TSH / cortisol also WNL. M benign without thiazide diuretic, SSRI, Trileptal, etc. Appears euvolemic.  No history of CHF, liver disease, nephrotic syndrome. PLAN- d/w pt at length; reviewed pathophysiology of hyponatremia, SIADH, and Na / water balance. No further w/u indicated; to maintain liberal sodium diet and consider moderating fluid intake ie \"drink-to-thirst\". No need for prescription salt tablets,  antagonist, urea packets, etc. Happy to f/u as needed.    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    HISTORY:  Past Medical History[1]   Past Surgical History[2]   Family History[3]   Social History: Short Social Hx on File[4]     Medications (Active prior to today's visit):  Current Medications[5]    Allergies:  Allergies[6]    ROS:     Denies fever/chills  Denies wt loss/gain  Denies HA or visual changes  Denies CP or palpitations  Denies SOB/cough/hemoptysis  Denies abd or flank pain  Denies N/V/D  Denies change in urinary habits or gross hematuria  Denies LE edema  Denies skin rashes/myalgias/arthralgias      PHYSICAL EXAM:   /70 (BP Location: Right arm, Patient Position: Sitting, Cuff Size: adult)   Wt  151 lb 9.6 oz (68.8 kg)   BMI 23.05 kg/m²   Wt Readings from Last 3 Encounters:   07/02/25 151 lb 9.6 oz (68.8 kg)   06/27/25 156 lb (70.8 kg)   06/27/25 156 lb (70.8 kg)     General: Alert and oriented in no apparent distress.  HEENT: No scleral icterus, MMM  Neck: Supple, no NICOLE or thyromegaly  Cardiac: Regular rate and rhythm, S1, S2 normal, no murmur or rub  Lungs: Clear without wheezes, rales, rhonchi.    Abdomen: Soft, non-tender. + bowel sounds, no palpable organomegaly  Extremities: Without clubbing, cyanosis or edema.  Neurologic: Alert and oriented, normal affect, cranial nerves grossly intact, moving all extremities  Skin: Warm and dry, no rashes      Ezio Bosch MD  7/2/2025  2:35 PM             [1]   Past Medical History:   ALCOHOL USE    Anemia    Anxiety    Atherosclerosis of coronary artery    Chronic coronary artery disease    Congestive heart disease (HCC)    Depression    Sometimes    Dysfunction of eustachian tube    Essential hypertension    Gastroesophageal reflux disease    Hyperlipidemia    Hypertension    Hypogonadism in male    Description: Per Dr. Byrne     Hypothyroidism    Non-Hodgkin's lymphoma (HCC)    Osteoarthritis   [2]   Past Surgical History:  Procedure Laterality Date    Angioplasty (coronary)      Bowel resection      Cath angio  05/01/2025    Colectomy      Colonoscopy      Hernia surgery      Other surgical history      Rotator cuff repair Right     Skin surgery      Tonsillectomy      Vasectomy     [3]   Family History  Problem Relation Age of Onset    Alcohol and Other Disorders Associated Other     High Blood Pressure Other     Diabetes Other     Heart Disease Other     Dementia Mother     Heart Disorder Mother     Dementia Father     Heart Disorder Father     Heart Disorder Brother     Heart Disorder Brother    [4]   Social History  Socioeconomic History    Marital status:    Tobacco Use    Smoking status: Former     Current packs/day: 0.00     Average  packs/day: 2.0 packs/day for 25.0 years (50.0 ttl pk-yrs)     Types: Cigarettes     Quit date: 1988     Years since quittin.2    Smokeless tobacco: Never   Vaping Use    Vaping status: Never Used   Substance and Sexual Activity    Alcohol use: Yes     Comment: couple beers a day but nothing since easter    Drug use: No   Other Topics Concern    Caffeine Concern Yes     Comment: Coffee     Social Drivers of Health     Food Insecurity: No Food Insecurity (2025)    NCSS - Food Insecurity     Worried About Running Out of Food in the Last Year: No     Ran Out of Food in the Last Year: No   Transportation Needs: No Transportation Needs (2025)    NCSS - Transportation     Lack of Transportation: No   Housing Stability: Not At Risk (2025)    NCSS - Housing/Utilities     Has Housing: Yes     Worried About Losing Housing: No     Unable to Get Utilities: No   [5]   Current Outpatient Medications   Medication Sig Dispense Refill    cefuroxime 250 MG Oral Tab Take 1 tablet (250 mg total) by mouth 2 (two) times daily for 5 days. 10 tablet 0    Gabapentin 100 MG Oral Tab Take 1 tablet (100 mg total) by mouth before bedtime.      methylPREDNISolone 4 MG Oral Tablet Therapy Pack Take as directed on dose pack instructions 21 tablet 0    cephALEXin 500 MG Oral Cap Take 1 capsule (500 mg total) by mouth 3 (three) times daily for 5 days. 14 capsule 0    ketoconazole 2 % External Shampoo Apply topically.      clobetasol 0.05 % External Solution Apply topically.      clobetasol 0.05 % External Cream Apply topically.      Cholecalciferol (VITAMIN D) 50 MCG (2000 UT) Oral Cap Take 1 capsule (2,000 Units total) by mouth in the morning.      vitamin E 400 UNITS Oral Cap Take 1 capsule (400 Units total) by mouth in the morning.      amLODIPine 5 MG Oral Tab Take 0.5 tablets (2.5 mg total) by mouth daily.      atenolol 25 MG Oral Tab Take 1 tablet (25 mg total) by mouth daily.      atorvastatin 10 MG Oral Tab Take 1  tablet (10 mg total) by mouth nightly.      tamsulosin 0.4 MG Oral Cap Take 1 capsule (0.4 mg total) by mouth daily.      levothyroxine 125 MCG Oral Tab Take 1 tablet (125 mcg total) by mouth before breakfast.  0    triamcinolone acetonide 0.025 % External Cream Apply topically in the morning and before bedtime.      aspirin 81 MG Oral Tab Take 1 tablet (81 mg total) by mouth every other day.      Bioflavonoid Products (DESIRAE C OR) Take 500 mg by mouth in the morning.      Multiple Vitamins-Minerals (MULTIVITAMIN OR) Take by mouth in the morning.      pantoprazole 40 MG Oral Tab EC Take 1 tablet (40 mg total) by mouth in the morning and 1 tablet (40 mg total) before bedtime.     [6]   Allergies  Allergen Reactions    Celecoxib RASH

## 2025-07-10 NOTE — PATIENT INSTRUCTIONS
Go to [facility] today.    Take the prednisone 40 mg for 3 days, 30 mg for 3 days, 20 mg for 3 days, 10 mg for 3 days, 5 mg for 4 days with breakfast.  The prednisone will be dispensed as a 10 mg tablet. Take your prednisone with a full meal and early in the day.  Do not take any Ibuprofen, Advil, Motrin, Naproxen, Aspirin while on Prednisone. Tylenol is OK for fever or pain.    Apply cool compresses to the wrist.  Keep it elevated.  Avoid any tight gripping or heavy lifting with the left hand.    I will have my office staff try to facilitate an appointment with the rheumatologist.  I have referred you to Dr. Dunn but you can see anybody in the group.    If you develop fever, chills, worsening redness or red streaking up the arm, you must be seen in the emergency room.

## 2025-07-10 NOTE — TELEPHONE ENCOUNTER
Please help facilitate an appointment with rheumatology. The referral was placed with Dr. Dunn but he can see anyone in the group. The patient has had progressively worsening left wrist pain since 6/10/2025. Cellulitis resolved but admitted end of June. He has been through 2 courses of antibiotics and 2 short courses of steroids. He is now unable to use his left hand due to the pain.

## 2025-07-10 NOTE — TELEPHONE ENCOUNTER
Mariana called back from Dr Dunn. They were able to get patient in on Monday 7/14 845am. They notified patient.

## 2025-07-10 NOTE — TELEPHONE ENCOUNTER
Spoke with Rachel CORRIGAN who does not do scheduling, so she transferred this RN to Mariana. Mariana took patient's information down and this RN discussed need for soon appointment. She stated she would discuss with DR Dunn due to no soon appointment, booking out until Nov Dec.

## 2025-07-10 NOTE — PROGRESS NOTES
The 21st Century Cures Act makes medical notes like these available to patients in the interest of transparency. Please be advised this is a medical document. Medical documents are intended to carry relevant information, facts as evident, and the clinical opinion of the practitioner. The medical note is intended as peer to peer communication and may appear blunt or direct. It is written in medical language and may contain abbreviations or verbiage that are unfamiliar.       James Roberts is a 79 year old male.    HPI:     Chief Complaint   Patient presents with    Follow - Up     Left hand and wrist        This 79-year-old male presents to the office for follow-up on a hospitalization at [facility] on 6/27/2025.  I had initially seen the patient on 6/10/2025 with acute onset of pain and redness to the left wrist.  This was treated with prednisone and Keflex for early cellulitis.  The patient presented to the emergency room with worsening left wrist pain and redness after completing his course of antibiotics.  Labs while in the hospital showed elevated CRP and normal uric acid.  The patient received IV antibiotics and was discharged home with a Medrol Dosepak and cephalexin.  The patient states he has completed both these medications.  The redness and swelling did improve while he was on the steroids.  He has now been off the medication for 3 days and the pain is worsening again.  He is right-hand dominant.  The patient states he cannot stand to have anything touch the wrist and he is having difficulty using the left hand and is having very limited range of motion of the left wrist.  The patient has been seeing his dermatologist and has had numerous procedures done for removal of skin cancer.  He had an excision to the left elbow and cryo done to a lesion on the dorsal aspect of his left wrist this week.  Both of those sites are without any signs of infection or drainage. The patient denies and fever or  chills.  He has no known history for rheumatologic disease.  He is rating his pain a 12 out of 10 today. He has an ZONIA of the left wrist scheduled on 7/25/2025.    HISTORY:  Past Medical History[1]   Past Surgical History[2]   Family History[3]   Social History: Short Social Hx on File[4]     Medications (Active prior to today's visit):  Current Medications[5]    Allergies:  Allergies[6]    ROS:     Pertinent positives and pertinent negatives are as listed in HPI.    All other review of symptoms were reviewed and negative.    PHYSICAL EXAM:   /64 (BP Location: Right arm, Patient Position: Sitting, Cuff Size: adult)   Pulse 94   Temp 97.8 °F (36.6 °C) (Temporal)   Resp 18   Ht 5' 8\" (1.727 m)   Wt 156 lb (70.8 kg)   SpO2 99%   BMI 23.72 kg/m²     Wt Readings from Last 3 Encounters:   07/10/25 156 lb (70.8 kg)   07/02/25 151 lb 9.6 oz (68.8 kg)   06/27/25 156 lb (70.8 kg)       BP Readings from Last 3 Encounters:   07/10/25 122/64   07/02/25 126/70   06/28/25 114/53       General: Well-nourished, well hydrated.  Severe discomfort due to the left wrist pain.  He is keeping the left elbow flexed with the hand elevated.  No pallor.   HEENT: Normocephalic, atraumatic.  JENNIFER, EOMI, Sclera clear and non icteric bilaterally.  Bilateral hearing aids are in place, nose without congestion, pharynx without redness or exudates. Moist mucous membranes.  Neck: Supple. No lymphadenopathy. No thyromegaly. No bruits noted.  Heart: RRR without S3 or S4 or murmur.  Lungs: Clear to auscultation bilaterally. No rales, rhonchi or wheezes. No tachypnea or retractions noted.  Extremities: No leg edema bilaterally.  The right hand and wrist shows no evidence for joint redness or swelling.  The left wrist is examined and it is mildly swollen.  It is quite warm to the touch.  There is no redness noted. He has significant limited range of motion and it is tender to the touch.  On the dorsal aspect of the left hand, there is a  healing area of cauterization to the skin lesion the dermatologist was treating and over the lateral aspect of the elbow, the patient has sutures in place from the excision of his skin cancer.  Both of the sites are without any signs of infection.  He has normal range of motion of the left elbow.  Skin: Warm and dry.  Neuro: Alert and oriented x 3, normal gait.  Psych: Normal mood and affect.     ASSESSMENT/PLAN:   79 year old male with    LABS This Visit:    Results for orders placed or performed during the hospital encounter of 06/27/25   Sed Rate, Shaylaren (Automated)    Collection Time: 06/27/25  2:46 PM   Result Value Ref Range    Sed Rate 40 (H) 0 - 20 mm/Hr   Basic Metabolic Panel (8)    Collection Time: 06/27/25  2:46 PM   Result Value Ref Range    Glucose 104 (H) 70 - 99 mg/dL    Sodium 129 (L) 136 - 145 mmol/L    Potassium 4.8 3.5 - 5.1 mmol/L    Chloride 95 (L) 98 - 112 mmol/L    CO2 27.0 21.0 - 32.0 mmol/L    Anion Gap 7 0 - 18 mmol/L    BUN 15 9 - 23 mg/dL    Creatinine 0.81 0.70 - 1.30 mg/dL    Calcium, Total 9.5 8.7 - 10.6 mg/dL    Calculated Osmolality 269 (L) 275 - 295 mOsm/kg    eGFR-Cr 90 >=60 mL/min/1.73m2   CBC With Differential With Platelet    Collection Time: 06/27/25  2:46 PM   Result Value Ref Range    WBC 8.0 4.0 - 11.0 x10(3) uL    RBC 3.07 (L) 3.80 - 5.80 x10(6)uL    HGB 9.1 (L) 13.0 - 17.5 g/dL    HCT 28.9 (L) 39.0 - 53.0 %    .0 (L) 150.0 - 450.0 10(3)uL    Immature Platelet Fraction 9.3 (H) 0.0 - 7.0 %    MCV 94.1 80.0 - 100.0 fL    MCH 29.6 26.0 - 34.0 pg    MCHC 31.5 31.0 - 37.0 g/dL    RDW 21.3 %    Neutrophil Absolute Prelim 4.55 1.50 - 7.70 x10 (3) uL    Neutrophil Absolute 4.55 1.50 - 7.70 x10(3) uL    Lymphocyte Absolute 0.75 (L) 1.00 - 4.00 x10(3) uL    Monocyte Absolute 2.42 (H) 0.10 - 1.00 x10(3) uL    Eosinophil Absolute 0.00 0.00 - 0.70 x10(3) uL    Basophil Absolute 0.01 0.00 - 0.20 x10(3) uL    Immature Granulocyte Absolute 0.28 0.00 - 1.00 x10(3) uL     Neutrophil % 56.8 %    Lymphocyte % 9.4 %    Monocyte % 30.2 %    Eosinophil % 0.0 %    Basophil % 0.1 %    Immature Granulocyte % 3.5 %   Scan slide    Collection Time: 06/27/25  2:46 PM   Result Value Ref Range    Slide Review Slide reviewed,morphology review added    RBC Morphology Scan    Collection Time: 06/27/25  2:46 PM   Result Value Ref Range    RBC Morphology See morphology below (A) Normal, Slide reviewed, see previous RBC morphology.    Platelet Morphology Normal Normal    Basophilic Stippling 1+      Microcytosis 1+      Macrocytosis 2+ (A)      Schistocytes 1+     POCT ISTAT chem8 cartridge    Collection Time: 06/27/25  2:54 PM   Result Value Ref Range    ISTAT Sodium 129 (L) 136 - 145 mmol/L    ISTAT BUN 17 7 - 18 mg/dL    ISTAT Potassium 4.8 3.6 - 5.1 mmol/L    ISTAT Chloride 94 (L) 98 - 112 mmol/L    ISTAT Ionized Calcium 1.13 1.12 - 1.32 mmol/L    ISTAT Hematocrit 29 (L) 37 - 53 %    ISTAT Glucose 102 (H) 70 - 99 mg/dL    ISTAT TCO2 25 21 - 32 mmol/L    ISTAT Sample Type Venous     ISTAT Creatinine 0.80 0.70 - 1.30 mg/dL    eGFR-Cr 91 >=60 mL/min/1.73m2   Lactic Acid, Plasma    Collection Time: 06/27/25  3:41 PM   Result Value Ref Range    Lactic Acid 1.0 0.5 - 2.0 mmol/L   Blood Culture    Collection Time: 06/27/25  3:41 PM    Specimen: Blood,peripheral   Result Value Ref Range    Blood Culture Result Positive     Blood Culture Result Staphylococcus epidermidis (A)     Blood Smear Positive Blood Culture (A)     Blood Smear Gram positive cocci in pairs and clusters (A)    Blood Culture PCR    Collection Time: 06/27/25  3:41 PM    Specimen: Blood,peripheral   Result Value Ref Range    Staphylococcus epidermidis by PCR    Detected (A) Not Detected    PCR Comment     Blood Culture    Collection Time: 06/27/25  3:47 PM    Specimen: Blood,peripheral   Result Value Ref Range    Blood Culture Result No Growth 5 Days    C-Reactive Protein    Collection Time: 06/27/25  8:23 PM   Result Value Ref Range     C-Reactive Protein 19.00 (H) <=0.50 mg/dL   Uric Acid    Collection Time: 06/27/25  8:23 PM   Result Value Ref Range    Uric Acid 4.4 3.7 - 9.2 mg/dL   Basic Metabolic Panel (8)    Collection Time: 06/28/25  5:00 AM   Result Value Ref Range    Glucose 156 (H) 70 - 99 mg/dL    Sodium 133 (L) 136 - 145 mmol/L    Potassium 4.4 3.5 - 5.1 mmol/L    Chloride 99 98 - 112 mmol/L    CO2 26.0 21.0 - 32.0 mmol/L    Anion Gap 8 0 - 18 mmol/L    BUN 15 9 - 23 mg/dL    Creatinine 0.75 0.70 - 1.30 mg/dL    Calcium, Total 9.3 8.7 - 10.6 mg/dL    Calculated Osmolality 280 275 - 295 mOsm/kg    eGFR-Cr 92 >=60 mL/min/1.73m2   Magnesium    Collection Time: 06/28/25  5:00 AM   Result Value Ref Range    Magnesium 1.8 1.6 - 2.6 mg/dL   CBC With Differential With Platelet    Collection Time: 06/28/25  5:00 AM   Result Value Ref Range    WBC 4.5 4.0 - 11.0 x10(3) uL    RBC 2.74 (L) 3.80 - 5.80 x10(6)uL    HGB 8.1 (L) 13.0 - 17.5 g/dL    HCT 25.7 (L) 39.0 - 53.0 %    .0 (L) 150.0 - 450.0 10(3)uL    Immature Platelet Fraction 8.0 (H) 0.0 - 7.0 %    MCV 93.8 80.0 - 100.0 fL    MCH 29.6 26.0 - 34.0 pg    MCHC 31.5 31.0 - 37.0 g/dL    RDW 21.1 %    Neutrophil Absolute Prelim 3.98 1.50 - 7.70 x10 (3) uL    Neutrophil Absolute 3.98 1.50 - 7.70 x10(3) uL    Lymphocyte Absolute 0.22 (L) 1.00 - 4.00 x10(3) uL    Monocyte Absolute 0.24 0.10 - 1.00 x10(3) uL    Eosinophil Absolute 0.00 0.00 - 0.70 x10(3) uL    Basophil Absolute 0.01 0.00 - 0.20 x10(3) uL    Immature Granulocyte Absolute 0.09 0.00 - 1.00 x10(3) uL    Neutrophil % 87.7 %    Lymphocyte % 4.8 %    Monocyte % 5.3 %    Eosinophil % 0.0 %    Basophil % 0.2 %    Immature Granulocyte % 2.0 %        1. Acute pain of left wrist    I told the patient, at this time, there does not appear to be any cellulitis.  He needs to continue to watch for fever, chills, increasing redness, swelling or red streaking up the arm or any pus from the wounds.  If that happens, the patient should be seen in  the closest emergency room.  He should continue to keep the left hand elevated and he can use cool compresses over the wrist to help with the pain and swelling.  We did discuss this appears to be some acute inflammatory process which is keeping the pain and swelling present.  I did tell him he cannot be on prednisone long-term but I am going to give him a longer taper.  He will take prednisone 40 mg for 3 days, 30 mg for 3 days, 20 mg for 3 days, 10 mg for 3 days, 5 mg for 4 days.  Usage and side effects were reviewed.  The patient was advised not to take any other anti-inflammatories while on the prednisone but it was okay to take Tylenol for pain.  I will have the office staff try to facilitate an office visit with the rheumatologist.  I have referred him to Dr. Dunn but he can see anybody in the group.  Follow-up with me will be pending his results.    - Connective Tissue Disease (AYLA) Screen, Reflex Specific Antibody ([place]); Future  - Rheumatoid Arthritis Factor; Future  - Cyclic Citrullinate Peptide (CCP) antibodies; Future  - Sed Rate, Westergren (Automated); Future  - C-Reactive Protein; Future  - Rheumatology Referral - In Network    2. Anemia, unspecified type    Patient had previous CBC which showed anemia.  His iron studies in May showed low total iron of 44, low transferrin 859, TIBC is low at 210 but his percent saturation was at the low end of normal at 21.  I am repeating his CBC and iron studies.    - CBC W Differential W Platelet [E]; Future       Health Maintenance:    Health Maintenance   Topic Date Due    Annual Physical  Never done    Colorectal Cancer Screening  09/15/2018    COVID-19 Vaccine (8 - 2024-25 season) 04/29/2025    Influenza Vaccine (1) 10/01/2025    Annual Depression Screening  Completed    Fall Risk Screening (Annual)  Completed    Pneumococcal Vaccine: 50+ Years  Completed    Meningococcal B Vaccine  Aged Out         Meds This Visit:  Requested Prescriptions      No  prescriptions requested or ordered in this encounter       Imaging & Referrals:  RHEUMATOLOGY - INTERNAL     Patient understands plan and follow-up.  Follow up with rheumatology.    7/10/2025  Shahla Lundberg DO    Total time: 20 minutes including precharting, H&P, plan of care    This dictation was performed with a verbal recognition program (DRAGON) and it was checked for errors. It is possible that there are still dictated errors within this office note. If so, please bring any errors to my attention for an addendum. All efforts were made to ensure that this office note is accurate         [1]   Past Medical History:   ALCOHOL USE    Anemia    Anxiety    Atherosclerosis of coronary artery    Chronic coronary artery disease    Congestive heart disease (HCC)    Depression    Sometimes    Dysfunction of eustachian tube    Essential hypertension    Gastroesophageal reflux disease    Hyperlipidemia    Hypertension    Hypogonadism in male    Description: Per Dr. Byrne     Hypothyroidism    Non-Hodgkin's lymphoma (HCC)    Osteoarthritis   [2]   Past Surgical History:  Procedure Laterality Date    Angioplasty (coronary)      Bowel resection      Cath angio  05/01/2025    Colectomy      Colonoscopy      Hernia surgery      Other surgical history      Rotator cuff repair Right     Skin surgery      Tonsillectomy      Vasectomy     [3]   Family History  Problem Relation Age of Onset    Alcohol and Other Disorders Associated Other     High Blood Pressure Other     Diabetes Other     Heart Disease Other     Dementia Mother     Heart Disorder Mother     Dementia Father     Heart Disorder Father     Heart Disorder Brother     Heart Disorder Brother    [4]   Social History  Socioeconomic History    Marital status:    Tobacco Use    Smoking status: Former     Current packs/day: 0.00     Average packs/day: 2.0 packs/day for 25.0 years (50.0 ttl pk-yrs)     Types: Cigarettes     Quit date: 4/1/1988     Years since  quittin.2    Smokeless tobacco: Never   Vaping Use    Vaping status: Never Used   Substance and Sexual Activity    Alcohol use: Yes     Comment: couple beers a day but nothing since easter    Drug use: No   Other Topics Concern    Caffeine Concern Yes     Comment: Coffee     Social Drivers of Health     Food Insecurity: No Food Insecurity (2025)    NCSS - Food Insecurity     Worried About Running Out of Food in the Last Year: No     Ran Out of Food in the Last Year: No   Transportation Needs: No Transportation Needs (2025)    NCSS - Transportation     Lack of Transportation: No   Housing Stability: Not At Risk (2025)    NCSS - Housing/Utilities     Has Housing: Yes     Worried About Losing Housing: No     Unable to Get Utilities: No   [5]   Current Outpatient Medications   Medication Sig Dispense Refill    Gabapentin 100 MG Oral Tab Take 1 tablet (100 mg total) by mouth before bedtime.      ketoconazole 2 % External Shampoo Apply topically.      clobetasol 0.05 % External Solution Apply topically.      clobetasol 0.05 % External Cream Apply topically.      Cholecalciferol (VITAMIN D) 50 MCG (2000 UT) Oral Cap Take 1 capsule (2,000 Units total) by mouth in the morning.      vitamin E 400 UNITS Oral Cap Take 1 capsule (400 Units total) by mouth in the morning.      amLODIPine 5 MG Oral Tab Take 0.5 tablets (2.5 mg total) by mouth daily.      atenolol 25 MG Oral Tab Take 1 tablet (25 mg total) by mouth daily.      atorvastatin 10 MG Oral Tab Take 1 tablet (10 mg total) by mouth nightly.      tamsulosin 0.4 MG Oral Cap Take 1 capsule (0.4 mg total) by mouth daily.      levothyroxine 125 MCG Oral Tab Take 1 tablet (125 mcg total) by mouth before breakfast.  0    triamcinolone acetonide 0.025 % External Cream Apply topically in the morning and before bedtime.      aspirin 81 MG Oral Tab Take 1 tablet (81 mg total) by mouth every other day.      Bioflavonoid Products (DESIRAE C OR) Take 500 mg by mouth in  the morning.      Multiple Vitamins-Minerals (MULTIVITAMIN OR) Take by mouth in the morning.      pantoprazole 40 MG Oral Tab EC Take 1 tablet (40 mg total) by mouth in the morning and 1 tablet (40 mg total) before bedtime.      predniSONE 10 MG Oral Tab Take 40 mg x  3 days, 30 mg x 3 days, 20 mg x 3 days, 10 x 3 days, 5 mg x 4 days with breakfast. 32 tablet 0   [6]   Allergies  Allergen Reactions    Celecoxib RASH

## 2025-07-14 NOTE — TELEPHONE ENCOUNTER
Pharmacy called office to request clarification for Prednisone prescription . Patient just filled prescription with another provider.     Explained per Dr. Dunn LOV note Continue prednisone at current dose, then reduce to 5 mg daily after current prescription is finished.

## 2025-07-14 NOTE — PROGRESS NOTES
The following individual(s) verbally consented to be recorded using ambient AI listening technology and understand that they can each withdraw their consent to this listening technology at any point by asking the clinician to turn off or pause the recording:    Patient name: James Roberts  Additional names:  Debbie Dunn

## 2025-07-14 NOTE — PATIENT INSTRUCTIONS
[employer] CT (DECT) scan needed to detect gouty arthropathy and tophi.     Needs to be done at [facility] in CT scan room #4 given this is the only scanner within our system with the technical capabilities to perform this imaging.

## 2025-07-14 NOTE — PROGRESS NOTES
Rheumatology New Patient Note  =====================================================================================================    Date of visit: 7/14/2025  ?  Chief complaint: left wrist/hand pain  Chief Complaint   Patient presents with    Consult     Pt is in to establish care for left arm and left wrist pain. Pain and swelling initially started 6/2025. Initially thought swelling and redness was cellulitis. Placed on multiple rounds of antibiotics, prednisone, and medrol dosepaks. MRI completed and would like to further discuss. Pain wraps around pinky finger, thumb, and wrist. Currently taking 30mg of prednisone and has Hx of Raynauds.      Referring (will send letter)  PCP  Shahla Lundberg DO  Fax: [phone]  Phone: [phone]  =====================================================================================================  HPI    James Roberts is a 79 year old male     James Roberts is a 79 year old male who presents for recurrent left arm/wrist/hand swelling.    -admitted 4/23-4/26/2025, 4/29-5/2/2025 and 6/27-6/28/2025 for recurrent left arm/hand/wrist swelling.  Initially thought to be cellulitis.  Given 6 weeks of antibiotics in aggregate.  Infectious workup was negative.  Staph epi contaminant noted on blood cultures.  Significant improvement with steroids noted.  Would have recurrence of his symptomatology after tapering off steroids resulting in readmission as noted above.  - Denies any antecedent events outside of a recent move from [place] back to [place].    He is currently on a tapering dose of prednisone, having started at 40 mg for 4 days and now reducing to 30 mg daily. The swelling and pain return when he stops the medication. No history of gout or pseudogout, though he has been tested for it. Occasional hand cramping is reported, but no consistent pain in the hands or arms prior to the current issue.    He has a history of degenerative arthritis affecting his  neck and spine, which was exacerbated by heavy lifting during the move. Chronic neck pain is reported, and he has had rotator cuff surgery in the past.    2004: Diagnosed with follicular lymphoma. S/p colectomy. Likely Stage IV due to bone marrow biopsy. S/p Rituximab and fludarabine.  2011 was noted to have progression of follicular lymphoma.  He had Zevalin therapy in December 2011. Seeing oncology.  Thought to be in remission. 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  He had recurrence in 2009, which was excised and had radiation.    14 point ROS negative except noted above    Medications:  Current Medications[1]    Past Medical History:  Past Medical History[2]  Past Surgical History:  Past Surgical History[3]  Family History:  Family History[4]  Social History:  Short Social Hx on File[5]  ?  Allergies:  Allergies[6]      Objective    Vitals:    07/14/25 0835   BP: 136/52   Pulse: 76   Resp: 16   Temp: 98 °F (36.7 °C)   SpO2: 97%   Weight: 155 lb (70.3 kg)   Height: 5' 8\" (1.727 m)       GEN: NAD, well-nourished.   HEENT: Head: NCAT. Face: No lesions. Eyes: Conjunctiva clear. Sclera are anicteric. PERRLA. EOMs are full. Ears: The right and left ear canals are clear.  Nose: No external or internal nasal deformities. Nasal septum is midline. Mouth: The lips are within normal limits.  No oral ulcers Tongue is midline with no lesions. The oral cavity is clear.   Neck: Supple. No neck masses.   PULM: easy effort  Extremities: No cyanosis, edema or deformities.   Neurologic: Strength, CN2-12 grossly intact   Psych: normal affect.   Skin: No lesions or rashes.  MSK: 28 joint count performed. No evidence of synovitis in mcp, pip, dip, wrist, elbows, shoulders, hips, knees, ankles, mtp unless otherwise noted. Full ROM of elbows, wrists, knees.     Significant MCP hypertrophy, tender in the left MCP 2, 3, PIP fullness  - Bilateral olecranon bursal  thickening without effusion  - Bilateral MTP 1 hypertrophy    Labs:      2025  RF/CCP/AYLA neg    Lab Results   Component Value Date    URIC 4.4 06/27/2025       Lab Results   Component Value Date    ESRML 34 (H) 07/10/2025    ESRML 40 (H) 06/27/2025    ESRML 79 (H) 05/02/2025    CRP 6.30 (H) 07/10/2025    CRP 19.00 (H) 06/27/2025    CRP 27.50 (H) 04/25/2025        Lab Results   Component Value Date    WBC 5.5 07/10/2025    RBC 2.93 (L) 07/10/2025    HGB 8.8 (L) 07/10/2025    HCT 27.6 (L) 07/10/2025    .0 07/10/2025    MCV 94.2 07/10/2025    MCH 30.0 07/10/2025    MCHC 31.9 07/10/2025    RDW 21.7 07/10/2025    NEPRELIM 2.73 07/10/2025    NEPERCENT 49.5 07/10/2025    LYPERCENT 13.4 07/10/2025    MOPERCENT 32.8 07/10/2025    EOPERCENT 0.5 07/10/2025    BAPERCENT 0.4 07/10/2025    NE 2.73 07/10/2025    LYMABS 0.74 (L) 07/10/2025    MOABSO 1.81 (H) 07/10/2025    EOABSO 0.03 07/10/2025    BAABSO 0.02 07/10/2025     Lab Results   Component Value Date     (H) 06/28/2025    BUN 15 06/28/2025    CREATSERUM 0.75 06/28/2025    ANIONGAP 8 06/28/2025    GFR 80 05/11/2015    GFRNAA 95 05/23/2022    GFRAA 110 05/23/2022    CA 9.3 06/28/2025    OSMOCALC 280 06/28/2025    ALKPHO 130 (H) 05/02/2025    AST 27 05/02/2025    ALT 42 05/02/2025    BILT 1.0 05/02/2025    TP 6.3 05/02/2025    ALB 3.9 05/02/2025    GLOBULIN 2.4 05/02/2025    AGRATIO 2.0 (H) 08/08/2019     (L) 06/28/2025    K 4.4 06/28/2025    CL 99 06/28/2025    CO2 26.0 06/28/2025         No results found for: \"ANATI\", \"AYLA\", \"ANAS\", \"ANASCRN\", \"ANASCRNRFLX\", \"AFIA\"  No results found for: \"SSA\", \"SSAUR\", \"ANTISSA\", \"SSA52\", \"SSA60\", \"SSADD\", \"SSB\", \"ANTISSB\"  No results found for: \"DSDNA\", \"ANTIDSDNA\", \"SMUD\", \"ANTISM\", \"SM\", \"RNP\", \"ANTIRNP\", \"SMITHRNP\"  No results found for: \"SCL70\", \"SCL\", \"DYVOHFY26\"  No results found for: \"C3\", \"C4\"  No results found for: \"DRVVT\", \"LAINT\", \"PTTLUPUS\", \"LUPUSINTERP\", \"LA\", \"K6ZT6XMOWX\", \"K0ZO3YPSBE\", \"Z2KEAVHBJB\",  \"V7MIEOYHRP\"  No results found for: \"CARDIOLIPIGG\", \"CARDIOLIPIGM\", \"CARDIOLIPIGA\", \"CARDIOIGA\", \"CARLIP\"      Additional Labs:    Radiology:    Radiology review:  XR HAND (MIN 3 VIEWS), LEFT (CPT=73130)  Result Date: 6/27/2025  CONCLUSION: 1.  No acute osseous findings. 2.  Osteoarthritis. 3.  Curvilinear foreign body along the first interphalangeal joint, correlate clinically. Electronically Verified and Signed by Attending Radiologist: Xochitl Reaves MD 6/27/2025 3:17 PM Workstation: [id number]    XR WRIST COMPLETE (MIN 3 VIEWS), LEFT (CPT=73110)  Result Date: 6/27/2025  CONCLUSION: 1.  No acute osseous findings. 2.  Osteoarthritis. 3.  Curvilinear foreign body along the first interphalangeal joint, correlate clinically. Electronically Verified and Signed by Attending Radiologist: Xochitl Reaves MD 6/27/2025 3:17 PM Workstation: [id number]    PET STANDARD BODY SCAN (ONCOLOGY) (CPT=78815)  Result Date: 5/15/2025  CONCLUSION:   1. The previously described collection and associated postoperative changes along the anterior right chest wall demonstrates no significant hypermetabolism.  However there is a 2nd collection along the posterior right chest wall extending along the right  flank that has been present as far back as 5/21/2022.  This measures up to 8.7 x 2.8 cm in the axial plane.  This demonstrates hypermetabolism or superiorly with peak SUV of approximately 5.7.  While this could be infectious/inflammatory or related to previous postoperative change, underlying lymphoma, metastatic disease, or other etiologies are not entirely excluded.  Clinical correlation and direct inspection is suggested.  2. There is no significant hypermetabolism associated with the previously described subcarinal lymph node which measures up to 1.8 x 1.4 cm and demonstrates a peak SUV of approximately 1.6.  While this is most likely reactive, clinical correlation and continued follow-up is suggested.  3. There is nonspecific soft  tissue nodularity and hypermetabolism noted along the bilateral inguinal regions.  On the right this has a CT measurement of approximately 1.5 x 0.8 cm with a peak SUV of 2.9.  On the left this measures 2.3 x 1.5 cm with a peak SUV of approximately 4.4.  This could be due to underlying lymphoma, with other etiologies not entirely excluded.  Clinical correlation and direct inspection is suggested.  Please see above for further details.  LOCATION:  [facility]    Dictated by ([facility]): Steven De La Cruz MD on 5/15/2025 at 11:28 AM     Finalized by (CST): Steven De La Cruz MD on 5/15/2025 at 11:37 AM       CTA CHEST + CT ABD (W) + CT PEL (W) (CPT=71275/52974)  Result Date: 4/29/2025  CONCLUSION:  1. Negative for pulmonary embolism. 2. Moderate to large layering bilateral pleural effusions, increased.  Mild associated basilar consolidation may be passive atelectasis.  The potential for pneumonia should be correlated with clinical suspicion for infection. 3. Stable right chest wall mass. 4. Stable mediastinal lymph nodes.  Single, subcarinal enlarged by size criteria. 5. Markedly distended urinary bladder.  Please correlate for retention. 6. Uncomplicated colonic diverticulosis. 7. Details as above.  Continued clinical correlation recommended.    LOCATION:  [facility]   Dictated by (CST): River Pan MD on 4/29/2025 at 2:38 PM     Finalized by (CST): River Pan MD on 4/29/2025 at 2:54 PM         CT CHEST (W+WO) (NPE=00713)  Result Date: 4/25/2025  CONCLUSION:  There are bilateral pleural effusions right greater than left with atelectasis/consolidation in the lung bases.  Right apical pleural parenchymal scarring with more focal pleural based consolidative process measuring 2.0 x 1.9 x 1.5 cm in the anterior upper lobe which may represent post radiation change.  Short-term follow-up recommended to ensure stability.  Mildly enlarged subcarinal lymph node.  There is a nonenhancing ovoid masslike area/collection arising from the right upper  chest wall anterior to the right 1st through 3rd ribs measuring 7.3 x 5.7 x 2.1 cm.  This is adjacent to surgical clips.  This may represent post surgical seroma/liquefying hematoma.  There is some subcutaneous edema in the right supraclavicular region with additional soft tissue stranding surrounding the right  sternoclavicular joint.  Clinical correlation recent surgical history/procedure history.  with Infection cannot entirely be excluded in the appropriate clinical setting.  LOCATION:  [facility]   Dictated by (CST): Rhonda Carranza MD on 4/25/2025 at 6:24 PM     Finalized by (CST): Rhonda Carranza MD on 4/25/2025 at 6:35 PM       CTA BRAIN (CPT=70496)  Addendum Date: 4/25/2025  ADDENDUM:  Three-dimensional image processing was completed using a separate workstation under concurrent supervision.  Dictated by (CST): Anson Paz MD on 4/25/2025 at 8:25 AM     Finalized by (CST): Anson Paz MD on 4/25/2025 at 8:25 AM             Result Date: 4/25/2025  CONCLUSION:   No significant midline shift or mass effect.  No acute intracranial hemorrhage.  Mild chronic microvascular ischemic changes are favored.  If there is persistent clinical concern then consider MRI.  There is no evidence of large vessel intracranial arterial occlusion.    LOCATION:  [facility]   Dictated by (CST): Anson Paz MD on 4/23/2025 at 5:38 PM     Finalized by (CST): Anson Paz MD on 4/23/2025 at 5:43 PM       US GROIN RIGHT LIMITED (CPT=76882)  Result Date: 4/23/2025  CONCLUSION:  See above.   LOCATION:  [facility]   Dictated by (CST): Anson Paz MD on 4/23/2025 at 7:28 PM     Finalized by (CST): Anson Paz MD on 4/23/2025 at 7:29 PM       CT SPINE CERVICAL (CPT=72125)  Result Date: 4/23/2025  CONCLUSION:  No evidence of acute fracture of the cervical spine.  Overall mild multilevel degenerative changes are present.  If there is persistent concern then consider follow-up imaging including MRI.  Limited views of the lung apices demonstrate airspace opacity of  the right lung apex which is not well assessed on this exam.  This could represent an area of scarring but is incompletely visualized.    LOCATION:  [facility]   Dictated by (CST): Anson Paz MD on 4/23/2025 at 5:32 PM     Finalized by (CST): Anson Paz MD on 4/23/2025 at 5:36 PM       XR CHEST AP PORTABLE  (CPT=71045)  Result Date: 4/23/2025  CONCLUSION:  1. Tiny left pleural effusion and left basilar atelectasis or scarring. 2. Postsurgical changes are seen in the right axilla/right upper chest.    LOCATION:  [facility]      Dictated by (CST): Zach Mayfield MD on 4/23/2025 at 4:06 PM     Finalized by (CST): Zach Mayfield MD on 4/23/2025 at 4:08 PM           =====================================================================================================  Assessment and Plan  Assessment:  1. Inflammatory polyarthritis (HCC)    2. Left hand pain    3. Periarticular calcification    4. Non-Hodgkin lymphoma of intra-abdominal lymph nodes, unspecified non-Hodgkin lymphoma type (HCC)      #Recurrent left upper extremity, wrist and hand swelling  - Hyperacute response with elevated inflammatory markers  - Treated for presumptive cellulitis for several weeks of antibiotics without resolution of recurrence  - Very steroid responsive.  Recurrence of symptoms with tapering off prednisone noted.  - Last hospitalization was late June 2025  - No hip or shoulder girdle symptoms to evoke PMR  - Negative serologies  - Significant MCP/wrist hypertrophy raises concern for crystalline arthropathy  - Obtain dual-energy CT of the left hand to evaluate for any MSU deposition  - Continue prednisone taper, currently doing fairly well at 30 mg daily  - Continue prednisone at current dose, then reduce to 5 mg daily after current prescription is finished.  - Keep MRI of left wrist scheduled but consider canceling if CT scan confirms gout.    #Cervical DJD  - Pending cervical spine MRI     #Follicular lymphoma  -Treated in 2004 and in  2011.  Thought to be in complete response  - Follows closely with oncology    #Bilateral pleural effusions  - S/p thoracentesis with exudative effusion  - Unclear exact etiology  - Follows closely with pulmonary and cardiology.  There is a question of pulmonary hypertension  ?  Rtc after the above  Plan:  Diagnoses and all orders for this visit:    Inflammatory polyarthritis (HCC)    Left hand pain  -     CT HAND LEFT (CPT=73200); Future    Periarticular calcification  -     CT HAND LEFT (CPT=73200); Future    Non-Hodgkin lymphoma of intra-abdominal lymph nodes, unspecified non-Hodgkin lymphoma type (HCC)    Other orders  -     predniSONE 5 MG Oral Tab; Take 1 tablet (5 mg total) by mouth daily.        No follow-ups on file.      The above plan of care, diagnosis, orders, and follow-up were discussed with the patient. Questions related to this recommended plan of care were answered.    Thank you for referring this delightful patient to me. Please feel free to contact me with any questions.     This report was performed utilizing speech recognition software technology. Despite proofreading, speech recognition errors could escape detection. If a word or phrase is confusing or out of context, please do not hesitate to call for   clarification.       Kind regards      Debbie Dunn MD  [facility]         [1]    predniSONE 5 MG Oral Tab Take 1 tablet (5 mg total) by mouth daily. 90 tablet 0    predniSONE 10 MG Oral Tab Take 40 mg x  3 days, 30 mg x 3 days, 20 mg x 3 days, 10 x 3 days, 5 mg x 4 days with breakfast. 32 tablet 0    Gabapentin 100 MG Oral Tab Take 1 tablet (100 mg total) by mouth before bedtime.      ketoconazole 2 % External Shampoo Apply topically.      clobetasol 0.05 % External Solution Apply topically.      clobetasol 0.05 % External Cream Apply topically.      Cholecalciferol (VITAMIN D) 50 MCG (2000 UT) Oral Cap Take 1 capsule (2,000 Units total) by mouth in the morning.      vitamin E 400 UNITS  Oral Cap Take 1 capsule (400 Units total) by mouth in the morning.      amLODIPine 5 MG Oral Tab Take 0.5 tablets (2.5 mg total) by mouth daily.      atenolol 25 MG Oral Tab Take 1 tablet (25 mg total) by mouth daily.      atorvastatin 10 MG Oral Tab Take 1 tablet (10 mg total) by mouth nightly.      tamsulosin 0.4 MG Oral Cap Take 1 capsule (0.4 mg total) by mouth daily.      levothyroxine 125 MCG Oral Tab Take 1 tablet (125 mcg total) by mouth before breakfast.  0    triamcinolone acetonide 0.025 % External Cream Apply topically in the morning and before bedtime.      aspirin 81 MG Oral Tab Take 1 tablet (81 mg total) by mouth every other day.      Bioflavonoid Products (DESIRAE C OR) Take 500 mg by mouth in the morning.      Multiple Vitamins-Minerals (MULTIVITAMIN OR) Take by mouth in the morning.      pantoprazole 40 MG Oral Tab EC Take 1 tablet (40 mg total) by mouth in the morning and 1 tablet (40 mg total) before bedtime.     [2]   Past Medical History:   ALCOHOL USE    Anemia    Anxiety    Atherosclerosis of coronary artery    Chronic coronary artery disease    Congestive heart disease (HCC)    Depression    Sometimes    Dysfunction of eustachian tube    Essential hypertension    Gastroesophageal reflux disease    Hyperlipidemia    Hypertension    Hypogonadism in male    Description: Per Dr. Byrne     Hypothyroidism    Non-Hodgkin's lymphoma (HCC)    Osteoarthritis   [3]   Past Surgical History:  Procedure Laterality Date    Angioplasty (coronary)      Bowel resection      Cath angio  05/01/2025    Colectomy      Colonoscopy      Hernia surgery      Other surgical history      Rotator cuff repair Right     Skin surgery      Tonsillectomy      Vasectomy     [4]   Family History  Problem Relation Age of Onset    Alcohol and Other Disorders Associated Other     High Blood Pressure Other     Diabetes Other     Heart Disease Other     Dementia Mother     Heart Disorder Mother     Dementia Father     Heart  Disorder Father     Heart Disorder Brother     Heart Disorder Brother    [5]   Social History  Socioeconomic History    Marital status:    Tobacco Use    Smoking status: Former     Current packs/day: 0.00     Average packs/day: 2.0 packs/day for 25.0 years (50.0 ttl pk-yrs)     Types: Cigarettes     Quit date: 1988     Years since quittin.3    Smokeless tobacco: Never   Vaping Use    Vaping status: Never Used   Substance and Sexual Activity    Alcohol use: Yes     Comment: couple beers a day but nothing since     Drug use: No   Other Topics Concern    Caffeine Concern Yes     Comment: Coffee     Social Drivers of Health     Food Insecurity: No Food Insecurity (2025)    NCSS - Food Insecurity     Worried About Running Out of Food in the Last Year: No     Ran Out of Food in the Last Year: No   Transportation Needs: No Transportation Needs (2025)    NCSS - Transportation     Lack of Transportation: No   Housing Stability: Not At Risk (2025)    NCSS - Housing/Utilities     Has Housing: Yes     Worried About Losing Housing: No     Unable to Get Utilities: No   [6]   Allergies  Allergen Reactions    Celecoxib RASH

## 2025-07-14 NOTE — TELEPHONE ENCOUNTER
Patient stated he has MRI scheduled on 7/26/25 and concerned with being able to be still for the long length of time and needing medication to help him relax.    Send to: [facility], [place]

## 2025-07-24 NOTE — PROGRESS NOTES
Patient: James Roberts (79 year old, male) Referring Provider:  Insurance:   Diagnosis: Neck pain Shahla Lundberg  MEDICARE   Date of Surgery: None Next MD visit:  [facility] [employer] INDEMNITY   Precautions:  None No data recorded Referral Information:    Date of Evaluation: Req: 0, Auth: 0, Exp:     07/01/25 POC Auth Visits:          Today's Date   7/24/2025    Subjective   Patient reports that he had surgery for Squamous cell Ca at L Trap area and another surgery is schedule for similar problem on the R clavicular area.        Pain:  2     Objective   See Treatment Log            Assessment   P has severely limited Cervical AROM specially side Bends. He was advised to perform Cervical ROM exercises daily.    Goals (to be met in 8 visits)   1: Improve Cervical Spine ROM to WFL and painfree.  2: Patient will be able to perform Personal Hygiene and daily household activities without Cervical pain.  3: Patient will be able to turn his head during driving.  4 Improve NDI from 56% to 46% or less.         Plan   Cont PT to Improve Cervical ROM and posture correction exercises.    Treatment Last 4 Visits  Treatment Day:         7/1/2025 7/24/2025   Spine Treatment   Therapeutic Exercise Reviewed HEP from past PT tx and Added Seated Thoracic Ext, Upper trap styretch to HEP 10 reps 3 times per day. Mala B Sh H Abd 4 mins.  UBE No Resistance Retro only 5 mins  Doorway stretch Low Arm position 30 secs X 5  B Sh Ext + Scap Retraction( Magnum) 20 reps 36 #v 20 reps   Seated Thoracic Ext over Towel Roll 30 reps with 5 secs Hold.  Seated AROM Cervical Spine 10 reps all palnes     Therapeutic Exercise Minutes 15 40   Evaluation Minutes 30    Total Time Of Timed Procedures 15 40   Total Time Of Service-Based Procedures 30 0   Total Treatment Time 45 40        HEP       Charges

## (undated) NOTE — LETTER
[address]  [address]  Consent for Procedure/Sedation  Date: _________         Time: ___________    I hereby authorize Dr. Skinner, my physician and his/her assistants (if applicable), which may include medical students, residents, and/or fellows, to perform the following surgical operation/ procedure and administer such anesthesia as may be determined necessary by my physician:  Operation/Procedure name (s)  Cardiac Catheterization, Left Ventricular Cineangiography, Bilateral Selective Coronary Angiography and/or Right Heart Catheterization; possible Percutaneous Transluminal Coronary Angioplasty, Coronary Atherectomy, Coronary Stent, Intracoronary Thrombolytic therapy, Antiplatelet therapy and/or Intravascular Ultrasound on James Roberts   2.   I recognize that during the surgical operation/procedure, unforeseen conditions may necessitate additional or different procedures than those listed above.  I, therefore, further authorize and request that the above-named surgeon, assistants, or designees perform such procedures as are, in their judgment, necessary and desirable.    3.   My surgeon/physician has discussed prior to my surgery the potential benefits, risks and side effects of this procedure; the likelihood of achieving goals; and potential problems that might occur during recuperation.  They also discussed reasonable alternatives to the procedure, including risks, benefits, and side effects related to the alternatives and risks related to not receiving this procedure.  I have had all my questions answered and I acknowledge that no guarantee has been made as to the result that may be obtained.    4.   Should the need arise during my operation/procedure, which includes change of level of care prior to discharge, I also consent to the administration of blood and/or blood products.  Further, I understand that despite careful testing and screening of blood or blood products by  collecting agencies, I may still be subject to ill effects as a result of receiving a blood transfusion and/or blood products.  The following are some, but not all, of the potential risks that can occur: fever and allergic reactions, hemolytic reactions, transmission of diseases such as Hepatitis, AIDS and Cytomegalovirus (CMV) and fluid overload.  In the event that I wish to have an autologous transfusion of my own blood, or a directed donor transfusion, I will discuss this with my physician.  Check only if Refusing Blood or Blood Products  I understand refusal of blood or blood products as deemed necessary by my physician may have serious consequences to my condition to include possible death. I hereby assume responsibility for my refusal and release the hospital, its personnel, and my physicians from any responsibility for the consequences of my refusal.          o  Refuse      5.   I authorize the use of any specimen, organs, tissues, body parts or foreign objects that may be removed from my body during the operation/procedure for diagnosis, research or teaching purposes and their subsequent disposal by hospital authorities.  I also authorize the release of specimen test results and/or written reports to my treating physician on the hospital medical staff or other referring or consulting physicians involved in my care, at the discretion of the Pathologist or my treating physician.    6.   I consent to the photographing or videotaping of the operations or procedures to be performed, including appropriate portions of my body for medical, scientific, or educational purposes, provided my identity is not revealed by the pictures or by descriptive texts accompanying them.  If the procedure has been photographed/videotaped, the surgeon will obtain the original picture, image, videotape or CD.  The hospital will not be responsible for storage, release or maintenance of the picture, image, tape or CD.    7.   I consent  to the presence of a  or observers in the operating room as deemed necessary by my physician or their designees.    8.   I recognize that in the event my procedure results in extended X-Ray/fluoroscopy time, I may develop a skin reaction.    9. If I have a Do Not Attempt Resuscitation (DNAR) order in place, that status will be suspended while in the operating room, procedural suite, and during the recovery period unless otherwise explicitly stated by me (or a person authorized to consent on my behalf). The surgeon or my attending physician will determine when the applicable recovery period ends for purposes of reinstating the DNAR order.  10. Patients having a sterilization procedure: I understand that if the procedure is successful the results will be permanent and it will therefore be impossible for me to inseminate, conceive, or bear children.  I also understand that the procedure is intended to result in sterility, although the result has not been guaranteed.   11. I acknowledge that my physician has explained sedation/analgesia administration to me including the risk and benefits I consent to the administration of sedation/analgesia as may be necessary or desirable in the judgment of my physician.    I CERTIFY THAT I HAVE READ AND FULLY UNDERSTAND THE ABOVE CONSENT TO OPERATION and/or OTHER PROCEDURE.      ____________________________________       _________________________________      ______________________________  Signature of Patient         Signature of Responsible Person        Printed Name of Responsible Person   ____________________________________      _________________________________      ______________________________       Signature of Witness          Relationship to Patient                       Date                                       Time  Patient Name: James Roberts  : 10/17/1945    Reviewed: 2024   Printed: 2025  Medical Record #: [id number] Page 1 of  1

## (undated) NOTE — LETTER
[address]  [address]  Authorization for Surgical Operation and Procedure     Date:_____5/2/2025______                                                                                                         Time:_____0936_____  I hereby authorize Dr. Manjeet Malagon, my physician and his/her assistants (if applicable), which may include medical students, residents, and/or fellows, to perform the following surgical operation/ procedure and administer such anesthesia as may be determined necessary by my physician:  Operation/Procedure name (s)  on James Roberts   2.   I recognize that during the surgical operation/procedure, unforeseen conditions may necessitate additional or different procedures than those listed above.  I, therefore, further authorize and request that the above-named surgeon, assistants, or designees perform such procedures as are, in their judgment, necessary and desirable.    3.   My surgeon/physician has discussed prior to my surgery the potential benefits, risks and side effects of this procedure; the likelihood of achieving goals; and potential problems that might occur during recuperation.  They also discussed reasonable alternatives to the procedure, including risks, benefits, and side effects related to the alternatives and risks related to not receiving this procedure.  I have had all my questions answered and I acknowledge that no guarantee has been made as to the result that may be obtained.    4.   Should the need arise during my operation/procedure, which includes change of level of care prior to discharge, I also consent to the administration of blood and/or blood products.  Further, I understand that despite careful testing and screening of blood or blood products by collecting agencies, I may still be subject to ill effects as a result of receiving a blood transfusion and/or blood products.  The following are some, but not all, of the potential  risks that can occur: fever and allergic reactions, hemolytic reactions, transmission of diseases such as Hepatitis, AIDS and Cytomegalovirus (CMV) and fluid overload.  In the event that I wish to have an autologous transfusion of my own blood, or a directed donor transfusion, I will discuss this with my physician.  Check only if Refusing Blood or Blood Products  I understand refusal of blood or blood products as deemed necessary by my physician may have serious consequences to my condition to include possible death. I hereby assume responsibility for my refusal and release the hospital, its personnel, and my physicians from any responsibility for the consequences of my refusal.          o  Refuse      5.   I authorize the use of any specimen, organs, tissues, body parts or foreign objects that may be removed from my body during the operation/procedure for diagnosis, research or teaching purposes and their subsequent disposal by hospital authorities.  I also authorize the release of specimen test results and/or written reports to my treating physician on the hospital medical staff or other referring or consulting physicians involved in my care, at the discretion of the Pathologist or my treating physician.    6.   I consent to the photographing or videotaping of the operations or procedures to be performed, including appropriate portions of my body for medical, scientific, or educational purposes, provided my identity is not revealed by the pictures or by descriptive texts accompanying them.  If the procedure has been photographed/videotaped, the surgeon will obtain the original picture, image, videotape or CD.  The hospital will not be responsible for storage, release or maintenance of the picture, image, tape or CD.    7.   I consent to the presence of a  or observers in the operating room as deemed necessary by my physician or their designees.    8.   I recognize that in the event my procedure  results in extended X-Ray/fluoroscopy time, I may develop a skin reaction.    9. If I have a Do Not Attempt Resuscitation (DNAR) order in place, that status will be suspended while in the operating room, procedural suite, and during the recovery period unless otherwise explicitly stated by me (or a person authorized to consent on my behalf). The surgeon or my attending physician will determine when the applicable recovery period ends for purposes of reinstating the DNAR order.  10. Patients having a sterilization procedure: I understand that if the procedure is successful the results will be permanent and it will therefore be impossible for me to inseminate, conceive, or bear children.  I also understand that the procedure is intended to result in sterility, although the result has not been guaranteed.   11. I acknowledge that my physician has explained sedation/analgesia administration to me including the risk and benefits I consent to the administration of sedation/analgesia as may be necessary or desirable in the judgment of my physician.    I CERTIFY THAT I HAVE READ AND FULLY UNDERSTAND THE ABOVE CONSENT TO OPERATION and/or OTHER PROCEDURE.    _________________________________________  __________________________________  Signature of Patient     Signature of Responsible Person         ___________________________________         Printed Name of Responsible Person           _________________________________                 Relationship to Patient  _________________________________________  ______________________________  Signature of Witness          Date  Time      Patient Name: James TANG Armando     : 10/17/1945                 Printed: May 2, 2025     Medical Record #: [id number]                     Page 1 [address]  [address]    Consent for Anesthesia    I, James Roberts agree to be cared for by an anesthesiologist, who is  specially trained to monitor me and give me medicine to put me to sleep or keep me comfortable during my procedure    I understand that my anesthesiologist is not an employee or agent of [facility] or [employer]. He or she works for [employer].    As the patient asking for anesthesia services, I agree to:  Allow the anesthesiologist (anesthesia doctor) to give me medicine and do additional procedures as necessary. Some examples are: Starting or using an “IV” to give me medicine, fluids or blood during my procedure, and having a breathing tube placed to help me breathe when I’m asleep (intubation). In the event that my heart stops working properly, I understand that my anesthesiologist will make every effort to sustain my life, unless otherwise directed by [facility] Do Not Resuscitate documents.  Tell my anesthesia doctor before my procedure:  If I am pregnant.  The last time that I ate or drank.  All of the medicines I take (including prescriptions, herbal supplements, and pills I can buy without a prescription (including street drugs/illegal medications). Failure to inform my anesthesiologist about these medicines may increase my risk of anesthetic complications.  If I am allergic to anything or have had a reaction to anesthesia before.  I understand how the anesthesia medicine will help me (benefits).  I understand that with any type of anesthesia medicine there are risks:  The most common risks are: nausea, vomiting, sore throat, muscle soreness, damage to my eyes, mouth, or teeth (from breathing tube placement).  Rare risks include: remembering what happened during my procedure, allergic reactions to medications, injury to my airway, heart, lungs, vision, nerves, or muscles and in extremely rare instances death.  My doctor has explained to me other choices available to me for my care (alternatives).  Pregnant Patients (“epidural”):  I understand that the risks of having  an epidural (medicine given into my back to help control pain during labor), include itching, low blood pressure, difficulty urinating, headache or slowing of the baby’s heart. Very rare risks include infection, bleeding, seizure, irregular heart rhythms and nerve injury.  Regional Anesthesia (“spinal”, “epidural”, & “nerve blocks”):  I understand that rare but potential complications include headache, bleeding, infection, seizure, irregular heart rhythms, and nerve injury.    I can change my mind about having anesthesia services at any time before I get the medicine.    _____________________________________________________________________________  Patient (or Representative) Signature/Relationship to Patient  Date   Time    _____________________________________________________________________________   Name (if used)    Language/Organization   Time    _____________________________________________________________________________  Anesthesiologist Signature     Date   Time  I have discussed the procedure and information above with the patient (or patient’s representative) and answered their questions. The patient or their representative has agreed to have anesthesia services.    _____________________________________________________________________________  Witness        Date   Time  I have verified that the signature is that of the patient or patient’s representative, and that it was signed before the procedure  Patient Name: James TANG Armando     : 10/17/1945                 Printed: May 2, 2025     Medical Record #: [id number]                     Page 2 of 2

## (undated) NOTE — LETTER
[address]  [address]  Consent for Procedure/Sedation  Date: ***         Time: ***    {[facility] ivs consent:7200}

## (undated) NOTE — Clinical Note
[address]  [address]  Authorization for Surgical Operation and Procedure     Date:___________                                                                                                         Time:__________  1. I hereby authorize * Surgery not found *, my physician and his/her assistants (if applicable), which may include medical students, residents, and/or fellows, to perform the following surgical operation/ procedure and administer such anesthesia as may be determined necessary by my physician:  Operation/Procedure name (s)  on James Roberts   2.   I recognize that during the surgical operation/procedure, unforeseen conditions may necessitate additional or different procedures than those listed above.  I, therefore, further authorize and request that the above-named surgeon, assistants, or designees perform such procedures as are, in their judgment, necessary and desirable.    3.   My surgeon/physician has discussed prior to my surgery the potential benefits, risks and side effects of this procedure; the likelihood of achieving goals; and potential problems that might occur during recuperation.  They also discussed reasonable alternatives to the procedure, including risks, benefits, and side effects related to the alternatives and risks related to not receiving this procedure.  I have had all my questions answered and I acknowledge that no guarantee has been made as to the result that may be obtained.    4.   Should the need arise during my operation/procedure, which includes change of level of care prior to discharge, I also consent to the administration of blood and/or blood products.  Further, I understand that despite careful testing and screening of blood or blood products by collecting agencies, I may still be subject to ill effects as a result of receiving a blood transfusion and/or blood products.  The following are some, but not all, of the potential  risks that can occur: fever and allergic reactions, hemolytic reactions, transmission of diseases such as Hepatitis, AIDS and Cytomegalovirus (CMV) and fluid overload.  In the event that I wish to have an autologous transfusion of my own blood, or a directed donor transfusion, I will discuss this with my physician.  Check only if Refusing Blood or Blood Products  I understand refusal of blood or blood products as deemed necessary by my physician may have serious consequences to my condition to include possible death. I hereby assume responsibility for my refusal and release the hospital, its personnel, and my physicians from any responsibility for the consequences of my refusal.          o  Refuse      5.   I authorize the use of any specimen, organs, tissues, body parts or foreign objects that may be removed from my body during the operation/procedure for diagnosis, research or teaching purposes and their subsequent disposal by hospital authorities.  I also authorize the release of specimen test results and/or written reports to my treating physician on the hospital medical staff or other referring or consulting physicians involved in my care, at the discretion of the Pathologist or my treating physician.    6.   I consent to the photographing or videotaping of the operations or procedures to be performed, including appropriate portions of my body for medical, scientific, or educational purposes, provided my identity is not revealed by the pictures or by descriptive texts accompanying them.  If the procedure has been photographed/videotaped, the surgeon will obtain the original picture, image, videotape or CD.  The hospital will not be responsible for storage, release or maintenance of the picture, image, tape or CD.    7.   I consent to the presence of a  or observers in the operating room as deemed necessary by my physician or their designees.    8.   I recognize that in the event my procedure  results in extended X-Ray/fluoroscopy time, I may develop a skin reaction.    9. If I have a Do Not Attempt Resuscitation (DNAR) order in place, that status will be suspended while in the operating room, procedural suite, and during the recovery period unless otherwise explicitly stated by me (or a person authorized to consent on my behalf). The surgeon or my attending physician will determine when the applicable recovery period ends for purposes of reinstating the DNAR order.  10. Patients having a sterilization procedure: I understand that if the procedure is successful the results will be permanent and it will therefore be impossible for me to inseminate, conceive, or bear children.  I also understand that the procedure is intended to result in sterility, although the result has not been guaranteed.   11. I acknowledge that my physician has explained sedation/analgesia administration to me including the risk and benefits I consent to the administration of sedation/analgesia as may be necessary or desirable in the judgment of my physician.    I CERTIFY THAT I HAVE READ AND FULLY UNDERSTAND THE ABOVE CONSENT TO OPERATION and/or OTHER PROCEDURE.    _________________________________________  __________________________________  Signature of Patient     Signature of Responsible Person         ___________________________________         Printed Name of Responsible Person           _________________________________                 Relationship to Patient  _________________________________________  ______________________________  Signature of Witness          Date  Time      Patient Name: James TANG Armando     : 10/17/1945                 Printed: May 2, 2025     Medical Record #: [id number]                     Page 1 [address]  [address]    Consent for Anesthesia    1. IJames agree to be cared for by an anesthesiologist, who is  specially trained to monitor me and give me medicine to put me to sleep or keep me comfortable during my procedure    I understand that my anesthesiologist is not an employee or agent of [facility] or [employer]. He or she works for [employer].    2. As the patient asking for anesthesia services, I agree to:  a. Allow the anesthesiologist (anesthesia doctor) to give me medicine and do additional procedures as necessary. Some examples are: Starting or using an “IV” to give me medicine, fluids or blood during my procedure, and having a breathing tube placed to help me breathe when I’m asleep (intubation). In the event that my heart stops working properly, I understand that my anesthesiologist will make every effort to sustain my life, unless otherwise directed by [facility] Do Not Resuscitate documents.  b. Tell my anesthesia doctor before my procedure:  i. If I am pregnant.  ii. The last time that I ate or drank.  iii. All of the medicines I take (including prescriptions, herbal supplements, and pills I can buy without a prescription (including street drugs/illegal medications). Failure to inform my anesthesiologist about these medicines may increase my risk of anesthetic complications.  iv. If I am allergic to anything or have had a reaction to anesthesia before.  3. I understand how the anesthesia medicine will help me (benefits).  4. I understand that with any type of anesthesia medicine there are risks:  a. The most common risks are: nausea, vomiting, sore throat, muscle soreness, damage to my eyes, mouth, or teeth (from breathing tube placement).  b. Rare risks include: remembering what happened during my procedure, allergic reactions to medications, injury to my airway, heart, lungs, vision, nerves, or muscles and in extremely rare instances death.  5. My doctor has explained to me other choices available to me for my care (alternatives).  6. Pregnant Patients  (“epidural”):  I understand that the risks of having an epidural (medicine given into my back to help control pain during labor), include itching, low blood pressure, difficulty urinating, headache or slowing of the baby’s heart. Very rare risks include infection, bleeding, seizure, irregular heart rhythms and nerve injury.  7. Regional Anesthesia (“spinal”, “epidural”, & “nerve blocks”):  I understand that rare but potential complications include headache, bleeding, infection, seizure, irregular heart rhythms, and nerve injury.    I can change my mind about having anesthesia services at any time before I get the medicine.    _____________________________________________________________________________  Patient (or Representative) Signature/Relationship to Patient  Date   Time    _____________________________________________________________________________   Name (if used)    Language/Organization   Time    _____________________________________________________________________________  Anesthesiologist Signature     Date   Time  I have discussed the procedure and information above with the patient (or patient’s representative) and answered their questions. The patient or their representative has agreed to have anesthesia services.    _____________________________________________________________________________  Witness        Date   Time  I have verified that the signature is that of the patient or patient’s representative, and that it was signed before the procedure  Patient Name: James Roberts     : 10/17/1945                 Printed: May 2, 2025     Medical Record #: [id number]                     Page 2 of 2

## (undated) NOTE — Clinical Note
Hi Still waiting for cytology on this patient's pleural fluid. PET scan on Thursday Continuing to follow with you call if you have any questions my cell is [phone] thanks

## (undated) NOTE — LETTER
Your patient was recently seen at the [facility] for a hospital follow-up visit. The visit note is attached. Please contact the clinic with any questions at [phone].    Thank you,  ANDREW Francois